# Patient Record
Sex: FEMALE | Race: WHITE | NOT HISPANIC OR LATINO | Employment: FULL TIME | URBAN - METROPOLITAN AREA
[De-identification: names, ages, dates, MRNs, and addresses within clinical notes are randomized per-mention and may not be internally consistent; named-entity substitution may affect disease eponyms.]

---

## 2021-05-25 ENCOUNTER — RECORDS - HEALTHEAST (OUTPATIENT)
Dept: ADMINISTRATIVE | Facility: CLINIC | Age: 46
End: 2021-05-25

## 2021-05-26 ENCOUNTER — RECORDS - HEALTHEAST (OUTPATIENT)
Dept: ADMINISTRATIVE | Facility: CLINIC | Age: 46
End: 2021-05-26

## 2021-05-27 ENCOUNTER — RECORDS - HEALTHEAST (OUTPATIENT)
Dept: ADMINISTRATIVE | Facility: CLINIC | Age: 46
End: 2021-05-27

## 2021-05-28 ENCOUNTER — RECORDS - HEALTHEAST (OUTPATIENT)
Dept: ADMINISTRATIVE | Facility: CLINIC | Age: 46
End: 2021-05-28

## 2023-02-06 ENCOUNTER — TRANSFERRED RECORDS (OUTPATIENT)
Dept: HEALTH INFORMATION MANAGEMENT | Facility: CLINIC | Age: 48
End: 2023-02-06
Payer: COMMERCIAL

## 2023-02-11 ENCOUNTER — TRANSFERRED RECORDS (OUTPATIENT)
Dept: HEALTH INFORMATION MANAGEMENT | Facility: CLINIC | Age: 48
End: 2023-02-11

## 2023-02-16 ENCOUNTER — TRANSFERRED RECORDS (OUTPATIENT)
Dept: HEALTH INFORMATION MANAGEMENT | Facility: CLINIC | Age: 48
End: 2023-02-16
Payer: COMMERCIAL

## 2023-03-09 ENCOUNTER — TRANSFERRED RECORDS (OUTPATIENT)
Dept: HEALTH INFORMATION MANAGEMENT | Facility: CLINIC | Age: 48
End: 2023-03-09
Payer: COMMERCIAL

## 2023-04-05 RX ORDER — METFORMIN HCL 500 MG
500 TABLET, EXTENDED RELEASE 24 HR ORAL
Status: ON HOLD | COMMUNITY
Start: 2022-11-15 | End: 2023-04-11

## 2023-04-05 RX ORDER — GABAPENTIN 300 MG/1
300 CAPSULE ORAL 3 TIMES DAILY
COMMUNITY
Start: 2023-04-03

## 2023-04-05 RX ORDER — ACETAMINOPHEN AND CODEINE PHOSPHATE 120; 12 MG/5ML; MG/5ML
0.35 SOLUTION ORAL AT BEDTIME
COMMUNITY
Start: 2022-11-11

## 2023-04-10 ENCOUNTER — HOSPITAL ENCOUNTER (INPATIENT)
Facility: CLINIC | Age: 48
LOS: 2 days | Discharge: HOME | DRG: 472 | End: 2023-04-12
Attending: ORTHOPAEDIC SURGERY | Admitting: ORTHOPAEDIC SURGERY
Payer: COMMERCIAL

## 2023-04-10 ENCOUNTER — APPOINTMENT (OUTPATIENT)
Dept: RADIOLOGY | Facility: CLINIC | Age: 48
DRG: 472 | End: 2023-04-10
Attending: ORTHOPAEDIC SURGERY
Payer: COMMERCIAL

## 2023-04-10 ENCOUNTER — ANESTHESIA EVENT (OUTPATIENT)
Dept: SURGERY | Facility: CLINIC | Age: 48
DRG: 472 | End: 2023-04-10
Payer: COMMERCIAL

## 2023-04-10 ENCOUNTER — ANESTHESIA (OUTPATIENT)
Dept: SURGERY | Facility: CLINIC | Age: 48
DRG: 472 | End: 2023-04-10
Payer: COMMERCIAL

## 2023-04-10 DIAGNOSIS — G47.33 OSA (OBSTRUCTIVE SLEEP APNEA): ICD-10-CM

## 2023-04-10 DIAGNOSIS — E11.9 TYPE 2 DIABETES MELLITUS WITHOUT COMPLICATION, WITHOUT LONG-TERM CURRENT USE OF INSULIN (H): ICD-10-CM

## 2023-04-10 DIAGNOSIS — I10 PRIMARY HYPERTENSION: ICD-10-CM

## 2023-04-10 DIAGNOSIS — Z98.890 S/P CERVICAL DISC REPLACEMENT: Primary | ICD-10-CM

## 2023-04-10 PROBLEM — M48.061 LUMBAR SPINAL STENOSIS: Status: ACTIVE | Noted: 2023-04-10

## 2023-04-10 LAB
GLUCOSE BLDC GLUCOMTR-MCNC: 178 MG/DL (ref 70–99)
GLUCOSE BLDC GLUCOMTR-MCNC: 180 MG/DL (ref 70–99)
GLUCOSE BLDC GLUCOMTR-MCNC: 205 MG/DL (ref 70–99)
GLUCOSE BLDC GLUCOMTR-MCNC: 212 MG/DL (ref 70–99)
HBA1C MFR BLD: 7 %

## 2023-04-10 PROCEDURE — 250N000009 HC RX 250: Performed by: STUDENT IN AN ORGANIZED HEALTH CARE EDUCATION/TRAINING PROGRAM

## 2023-04-10 PROCEDURE — 360N000078 HC SURGERY LEVEL 5, PER MIN: Performed by: ORTHOPAEDIC SURGERY

## 2023-04-10 PROCEDURE — 258N000003 HC RX IP 258 OP 636: Performed by: PHYSICIAN ASSISTANT

## 2023-04-10 PROCEDURE — 0RG10A0 FUSION OF CERVICAL VERTEBRAL JOINT WITH INTERBODY FUSION DEVICE, ANTERIOR APPROACH, ANTERIOR COLUMN, OPEN APPROACH: ICD-10-PCS | Performed by: ORTHOPAEDIC SURGERY

## 2023-04-10 PROCEDURE — 0RT30ZZ RESECTION OF CERVICAL VERTEBRAL DISC, OPEN APPROACH: ICD-10-PCS | Performed by: ORTHOPAEDIC SURGERY

## 2023-04-10 PROCEDURE — 250N000011 HC RX IP 250 OP 636: Performed by: ANESTHESIOLOGY

## 2023-04-10 PROCEDURE — 01N10ZZ RELEASE CERVICAL NERVE, OPEN APPROACH: ICD-10-PCS | Performed by: ORTHOPAEDIC SURGERY

## 2023-04-10 PROCEDURE — 250N000011 HC RX IP 250 OP 636: Performed by: PHYSICIAN ASSISTANT

## 2023-04-10 PROCEDURE — 272N000001 HC OR GENERAL SUPPLY STERILE: Performed by: ORTHOPAEDIC SURGERY

## 2023-04-10 PROCEDURE — 999N000141 HC STATISTIC PRE-PROCEDURE NURSING ASSESSMENT: Performed by: ORTHOPAEDIC SURGERY

## 2023-04-10 PROCEDURE — C1776 JOINT DEVICE (IMPLANTABLE): HCPCS | Performed by: ORTHOPAEDIC SURGERY

## 2023-04-10 PROCEDURE — 83036 HEMOGLOBIN GLYCOSYLATED A1C: CPT | Performed by: FAMILY MEDICINE

## 2023-04-10 PROCEDURE — 258N000003 HC RX IP 258 OP 636: Performed by: STUDENT IN AN ORGANIZED HEALTH CARE EDUCATION/TRAINING PROGRAM

## 2023-04-10 PROCEDURE — 250N000025 HC SEVOFLURANE, PER MIN: Performed by: ORTHOPAEDIC SURGERY

## 2023-04-10 PROCEDURE — 250N000013 HC RX MED GY IP 250 OP 250 PS 637: Performed by: ANESTHESIOLOGY

## 2023-04-10 PROCEDURE — C1762 CONN TISS, HUMAN(INC FASCIA): HCPCS | Performed by: ORTHOPAEDIC SURGERY

## 2023-04-10 PROCEDURE — 250N000013 HC RX MED GY IP 250 OP 250 PS 637: Performed by: PHYSICIAN ASSISTANT

## 2023-04-10 PROCEDURE — 36415 COLL VENOUS BLD VENIPUNCTURE: CPT | Performed by: FAMILY MEDICINE

## 2023-04-10 PROCEDURE — 250N000013 HC RX MED GY IP 250 OP 250 PS 637: Performed by: FAMILY MEDICINE

## 2023-04-10 PROCEDURE — 250N000012 HC RX MED GY IP 250 OP 636 PS 637: Performed by: FAMILY MEDICINE

## 2023-04-10 PROCEDURE — C1713 ANCHOR/SCREW BN/BN,TIS/BN: HCPCS | Performed by: ORTHOPAEDIC SURGERY

## 2023-04-10 PROCEDURE — 258N000003 HC RX IP 258 OP 636: Performed by: ANESTHESIOLOGY

## 2023-04-10 PROCEDURE — 250N000005 HC OR RX SURGIFLO HEMOSTATIC MATRIX 10ML 199102S OPNP: Performed by: ORTHOPAEDIC SURGERY

## 2023-04-10 PROCEDURE — 370N000017 HC ANESTHESIA TECHNICAL FEE, PER MIN: Performed by: ORTHOPAEDIC SURGERY

## 2023-04-10 PROCEDURE — 999N000182 XR SURGERY CARM FLUORO GREATER THAN 5 MIN: Mod: TC

## 2023-04-10 PROCEDURE — 250N000009 HC RX 250: Performed by: ORTHOPAEDIC SURGERY

## 2023-04-10 PROCEDURE — 710N000010 HC RECOVERY PHASE 1, LEVEL 2, PER MIN: Performed by: ORTHOPAEDIC SURGERY

## 2023-04-10 PROCEDURE — 120N000001 HC R&B MED SURG/OB

## 2023-04-10 PROCEDURE — 0RG10K0 FUSION OF CERVICAL VERTEBRAL JOINT WITH NONAUTOLOGOUS TISSUE SUBSTITUTE, ANTERIOR APPROACH, ANTERIOR COLUMN, OPEN APPROACH: ICD-10-PCS | Performed by: ORTHOPAEDIC SURGERY

## 2023-04-10 PROCEDURE — 99222 1ST HOSP IP/OBS MODERATE 55: CPT | Performed by: FAMILY MEDICINE

## 2023-04-10 PROCEDURE — 250N000011 HC RX IP 250 OP 636: Performed by: STUDENT IN AN ORGANIZED HEALTH CARE EDUCATION/TRAINING PROGRAM

## 2023-04-10 PROCEDURE — 278N000051 HC OR IMPLANT GENERAL: Performed by: ORTHOPAEDIC SURGERY

## 2023-04-10 PROCEDURE — 82962 GLUCOSE BLOOD TEST: CPT

## 2023-04-10 DEVICE — MOBI-C IMPLANT M « STANDARD » 17X17 H5 US
Type: IMPLANTABLE DEVICE | Site: SPINE CERVICAL | Status: FUNCTIONAL
Brand: MOBI-C

## 2023-04-10 DEVICE — ROI-C LONG ANCHORING PLATE
Type: IMPLANTABLE DEVICE | Site: SPINE CERVICAL | Status: FUNCTIONAL
Brand: ROI-C

## 2023-04-10 DEVICE — ROI-C LORDOTIC COATED IMPLANT 14X17 H8
Type: IMPLANTABLE DEVICE | Site: SPINE CERVICAL | Status: FUNCTIONAL
Brand: ROI-C

## 2023-04-10 DEVICE — IMPLANTABLE DEVICE: Type: IMPLANTABLE DEVICE | Status: FUNCTIONAL

## 2023-04-10 DEVICE — GRAFT ALLOGRAFT ARTHREX ALLOSYNC DBM PASTE 1ML ABS-2015-01: Type: IMPLANTABLE DEVICE | Site: SPINE CERVICAL | Status: FUNCTIONAL

## 2023-04-10 RX ORDER — GABAPENTIN 300 MG/1
300 CAPSULE ORAL
Status: COMPLETED | OUTPATIENT
Start: 2023-04-10 | End: 2023-04-10

## 2023-04-10 RX ORDER — LIDOCAINE 40 MG/G
CREAM TOPICAL
Status: DISCONTINUED | OUTPATIENT
Start: 2023-04-10 | End: 2023-04-10 | Stop reason: HOSPADM

## 2023-04-10 RX ORDER — FENTANYL CITRATE 50 UG/ML
25 INJECTION, SOLUTION INTRAMUSCULAR; INTRAVENOUS EVERY 5 MIN PRN
Status: DISCONTINUED | OUTPATIENT
Start: 2023-04-10 | End: 2023-04-10 | Stop reason: HOSPADM

## 2023-04-10 RX ORDER — MULTIVITAMIN,THERAPEUTIC
1 TABLET ORAL DAILY
Status: DISCONTINUED | OUTPATIENT
Start: 2023-04-11 | End: 2023-04-12 | Stop reason: HOSPADM

## 2023-04-10 RX ORDER — POLYETHYLENE GLYCOL 3350 17 G/17G
17 POWDER, FOR SOLUTION ORAL DAILY
Status: DISCONTINUED | OUTPATIENT
Start: 2023-04-11 | End: 2023-04-12 | Stop reason: HOSPADM

## 2023-04-10 RX ORDER — SODIUM CHLORIDE, SODIUM LACTATE, POTASSIUM CHLORIDE, CALCIUM CHLORIDE 600; 310; 30; 20 MG/100ML; MG/100ML; MG/100ML; MG/100ML
INJECTION, SOLUTION INTRAVENOUS CONTINUOUS
Status: DISCONTINUED | OUTPATIENT
Start: 2023-04-10 | End: 2023-04-10 | Stop reason: HOSPADM

## 2023-04-10 RX ORDER — LORATADINE 10 MG/1
10 TABLET ORAL DAILY PRN
Status: DISCONTINUED | OUTPATIENT
Start: 2023-04-10 | End: 2023-04-12 | Stop reason: HOSPADM

## 2023-04-10 RX ORDER — ONDANSETRON 4 MG/1
4 TABLET, ORALLY DISINTEGRATING ORAL EVERY 6 HOURS PRN
Status: DISCONTINUED | OUTPATIENT
Start: 2023-04-10 | End: 2023-04-12 | Stop reason: HOSPADM

## 2023-04-10 RX ORDER — ONDANSETRON 2 MG/ML
4 INJECTION INTRAMUSCULAR; INTRAVENOUS EVERY 30 MIN PRN
Status: DISCONTINUED | OUTPATIENT
Start: 2023-04-10 | End: 2023-04-10 | Stop reason: HOSPADM

## 2023-04-10 RX ORDER — GABAPENTIN 100 MG/1
100 CAPSULE ORAL 3 TIMES DAILY
Status: DISCONTINUED | OUTPATIENT
Start: 2023-04-10 | End: 2023-04-10

## 2023-04-10 RX ORDER — ONDANSETRON 2 MG/ML
INJECTION INTRAMUSCULAR; INTRAVENOUS PRN
Status: DISCONTINUED | OUTPATIENT
Start: 2023-04-10 | End: 2023-04-10

## 2023-04-10 RX ORDER — FENTANYL CITRATE 50 UG/ML
50 INJECTION, SOLUTION INTRAMUSCULAR; INTRAVENOUS EVERY 5 MIN PRN
Status: DISCONTINUED | OUTPATIENT
Start: 2023-04-10 | End: 2023-04-10 | Stop reason: HOSPADM

## 2023-04-10 RX ORDER — HYDROMORPHONE HCL IN WATER/PF 6 MG/30 ML
0.2 PATIENT CONTROLLED ANALGESIA SYRINGE INTRAVENOUS
Status: DISCONTINUED | OUTPATIENT
Start: 2023-04-10 | End: 2023-04-12 | Stop reason: HOSPADM

## 2023-04-10 RX ORDER — OXYCODONE HYDROCHLORIDE 5 MG/1
5 TABLET ORAL EVERY 4 HOURS PRN
Status: DISCONTINUED | OUTPATIENT
Start: 2023-04-10 | End: 2023-04-11

## 2023-04-10 RX ORDER — ONDANSETRON 2 MG/ML
4 INJECTION INTRAMUSCULAR; INTRAVENOUS EVERY 6 HOURS PRN
Status: DISCONTINUED | OUTPATIENT
Start: 2023-04-10 | End: 2023-04-12 | Stop reason: HOSPADM

## 2023-04-10 RX ORDER — CEFAZOLIN SODIUM 2 G/100ML
2 INJECTION, SOLUTION INTRAVENOUS EVERY 8 HOURS
Status: COMPLETED | OUTPATIENT
Start: 2023-04-10 | End: 2023-04-11

## 2023-04-10 RX ORDER — ACETAMINOPHEN 325 MG/1
650 TABLET ORAL EVERY 4 HOURS PRN
Status: DISCONTINUED | OUTPATIENT
Start: 2023-04-13 | End: 2023-04-12 | Stop reason: HOSPADM

## 2023-04-10 RX ORDER — PROCHLORPERAZINE MALEATE 10 MG
10 TABLET ORAL EVERY 6 HOURS PRN
Status: DISCONTINUED | OUTPATIENT
Start: 2023-04-10 | End: 2023-04-12 | Stop reason: HOSPADM

## 2023-04-10 RX ORDER — HYDROXYZINE HYDROCHLORIDE 25 MG/1
25 TABLET, FILM COATED ORAL EVERY 6 HOURS PRN
Status: DISCONTINUED | OUTPATIENT
Start: 2023-04-10 | End: 2023-04-12 | Stop reason: HOSPADM

## 2023-04-10 RX ORDER — HYDROMORPHONE HCL IN WATER/PF 6 MG/30 ML
0.2 PATIENT CONTROLLED ANALGESIA SYRINGE INTRAVENOUS EVERY 5 MIN PRN
Status: DISCONTINUED | OUTPATIENT
Start: 2023-04-10 | End: 2023-04-10 | Stop reason: HOSPADM

## 2023-04-10 RX ORDER — ACETAMINOPHEN 325 MG/1
975 TABLET ORAL EVERY 8 HOURS
Status: DISCONTINUED | OUTPATIENT
Start: 2023-04-10 | End: 2023-04-11

## 2023-04-10 RX ORDER — EPHEDRINE SULFATE 50 MG/ML
INJECTION, SOLUTION INTRAMUSCULAR; INTRAVENOUS; SUBCUTANEOUS PRN
Status: DISCONTINUED | OUTPATIENT
Start: 2023-04-10 | End: 2023-04-10

## 2023-04-10 RX ORDER — AMOXICILLIN 250 MG
1 CAPSULE ORAL 2 TIMES DAILY
Status: DISCONTINUED | OUTPATIENT
Start: 2023-04-10 | End: 2023-04-12 | Stop reason: HOSPADM

## 2023-04-10 RX ORDER — SODIUM CHLORIDE 9 MG/ML
INJECTION, SOLUTION INTRAVENOUS CONTINUOUS
Status: DISCONTINUED | OUTPATIENT
Start: 2023-04-10 | End: 2023-04-12 | Stop reason: HOSPADM

## 2023-04-10 RX ORDER — FENTANYL CITRATE 50 UG/ML
INJECTION, SOLUTION INTRAMUSCULAR; INTRAVENOUS PRN
Status: DISCONTINUED | OUTPATIENT
Start: 2023-04-10 | End: 2023-04-10

## 2023-04-10 RX ORDER — ACETAMINOPHEN 325 MG/1
975 TABLET ORAL ONCE
Status: COMPLETED | OUTPATIENT
Start: 2023-04-10 | End: 2023-04-10

## 2023-04-10 RX ORDER — DEXAMETHASONE SODIUM PHOSPHATE 4 MG/ML
INJECTION, SOLUTION INTRA-ARTICULAR; INTRALESIONAL; INTRAMUSCULAR; INTRAVENOUS; SOFT TISSUE PRN
Status: DISCONTINUED | OUTPATIENT
Start: 2023-04-10 | End: 2023-04-10

## 2023-04-10 RX ORDER — CEFAZOLIN SODIUM/WATER 3 G/30 ML
3 SYRINGE (ML) INTRAVENOUS SEE ADMIN INSTRUCTIONS
Status: DISCONTINUED | OUTPATIENT
Start: 2023-04-10 | End: 2023-04-10 | Stop reason: HOSPADM

## 2023-04-10 RX ORDER — CEFAZOLIN SODIUM/WATER 3 G/30 ML
3 SYRINGE (ML) INTRAVENOUS
Status: COMPLETED | OUTPATIENT
Start: 2023-04-10 | End: 2023-04-10

## 2023-04-10 RX ORDER — GABAPENTIN 300 MG/1
300 CAPSULE ORAL 3 TIMES DAILY
Status: DISCONTINUED | OUTPATIENT
Start: 2023-04-10 | End: 2023-04-11

## 2023-04-10 RX ORDER — PROPOFOL 10 MG/ML
INJECTION, EMULSION INTRAVENOUS CONTINUOUS PRN
Status: DISCONTINUED | OUTPATIENT
Start: 2023-04-10 | End: 2023-04-10

## 2023-04-10 RX ORDER — NICOTINE POLACRILEX 4 MG
15-30 LOZENGE BUCCAL
Status: DISCONTINUED | OUTPATIENT
Start: 2023-04-10 | End: 2023-04-12 | Stop reason: HOSPADM

## 2023-04-10 RX ORDER — ACETAMINOPHEN AND CODEINE PHOSPHATE 120; 12 MG/5ML; MG/5ML
0.35 SOLUTION ORAL AT BEDTIME
Status: DISCONTINUED | OUTPATIENT
Start: 2023-04-10 | End: 2023-04-12 | Stop reason: HOSPADM

## 2023-04-10 RX ORDER — DEXTROSE MONOHYDRATE 25 G/50ML
25-50 INJECTION, SOLUTION INTRAVENOUS
Status: DISCONTINUED | OUTPATIENT
Start: 2023-04-10 | End: 2023-04-12 | Stop reason: HOSPADM

## 2023-04-10 RX ORDER — ONDANSETRON 4 MG/1
4 TABLET, ORALLY DISINTEGRATING ORAL EVERY 30 MIN PRN
Status: DISCONTINUED | OUTPATIENT
Start: 2023-04-10 | End: 2023-04-10 | Stop reason: HOSPADM

## 2023-04-10 RX ORDER — OXYCODONE HYDROCHLORIDE 5 MG/1
10 TABLET ORAL EVERY 4 HOURS PRN
Status: DISCONTINUED | OUTPATIENT
Start: 2023-04-10 | End: 2023-04-11

## 2023-04-10 RX ORDER — HYDROMORPHONE HCL IN WATER/PF 6 MG/30 ML
0.4 PATIENT CONTROLLED ANALGESIA SYRINGE INTRAVENOUS EVERY 5 MIN PRN
Status: DISCONTINUED | OUTPATIENT
Start: 2023-04-10 | End: 2023-04-10 | Stop reason: HOSPADM

## 2023-04-10 RX ORDER — LIDOCAINE HYDROCHLORIDE 10 MG/ML
INJECTION, SOLUTION INFILTRATION; PERINEURAL PRN
Status: DISCONTINUED | OUTPATIENT
Start: 2023-04-10 | End: 2023-04-10

## 2023-04-10 RX ORDER — PROPOFOL 10 MG/ML
INJECTION, EMULSION INTRAVENOUS PRN
Status: DISCONTINUED | OUTPATIENT
Start: 2023-04-10 | End: 2023-04-10

## 2023-04-10 RX ORDER — BISACODYL 10 MG
10 SUPPOSITORY, RECTAL RECTAL DAILY PRN
Status: DISCONTINUED | OUTPATIENT
Start: 2023-04-10 | End: 2023-04-12 | Stop reason: HOSPADM

## 2023-04-10 RX ORDER — HYDROMORPHONE HCL IN WATER/PF 6 MG/30 ML
0.4 PATIENT CONTROLLED ANALGESIA SYRINGE INTRAVENOUS
Status: DISCONTINUED | OUTPATIENT
Start: 2023-04-10 | End: 2023-04-12 | Stop reason: HOSPADM

## 2023-04-10 RX ORDER — MAGNESIUM HYDROXIDE 1200 MG/15ML
LIQUID ORAL PRN
Status: DISCONTINUED | OUTPATIENT
Start: 2023-04-10 | End: 2023-04-10 | Stop reason: HOSPADM

## 2023-04-10 RX ADMIN — SODIUM CHLORIDE, POTASSIUM CHLORIDE, SODIUM LACTATE AND CALCIUM CHLORIDE: 600; 310; 30; 20 INJECTION, SOLUTION INTRAVENOUS at 08:51

## 2023-04-10 RX ADMIN — Medication 3 G: at 09:30

## 2023-04-10 RX ADMIN — BENZOCAINE AND MENTHOL 1 LOZENGE: 15; 3.6 LOZENGE ORAL at 18:07

## 2023-04-10 RX ADMIN — ACETAMINOPHEN 975 MG: 325 TABLET ORAL at 08:25

## 2023-04-10 RX ADMIN — MIDAZOLAM 2 MG: 1 INJECTION INTRAMUSCULAR; INTRAVENOUS at 09:30

## 2023-04-10 RX ADMIN — ROCURONIUM BROMIDE 10 MG: 10 INJECTION, SOLUTION INTRAVENOUS at 10:42

## 2023-04-10 RX ADMIN — PHENYLEPHRINE HYDROCHLORIDE 100 MCG: 10 INJECTION INTRAVENOUS at 10:24

## 2023-04-10 RX ADMIN — SODIUM CHLORIDE, POTASSIUM CHLORIDE, SODIUM LACTATE AND CALCIUM CHLORIDE: 600; 310; 30; 20 INJECTION, SOLUTION INTRAVENOUS at 11:20

## 2023-04-10 RX ADMIN — PROPOFOL 50 MCG/KG/MIN: 10 INJECTION, EMULSION INTRAVENOUS at 09:45

## 2023-04-10 RX ADMIN — CEFAZOLIN SODIUM 2 G: 2 INJECTION, SOLUTION INTRAVENOUS at 16:47

## 2023-04-10 RX ADMIN — LIDOCAINE HYDROCHLORIDE 2 ML: 10 INJECTION, SOLUTION INFILTRATION; PERINEURAL at 09:39

## 2023-04-10 RX ADMIN — BENZOCAINE AND MENTHOL 1 LOZENGE: 15; 3.6 LOZENGE ORAL at 20:08

## 2023-04-10 RX ADMIN — Medication 5 MG: at 10:38

## 2023-04-10 RX ADMIN — DEXAMETHASONE SODIUM PHOSPHATE 4 MG: 4 INJECTION, SOLUTION INTRA-ARTICULAR; INTRALESIONAL; INTRAMUSCULAR; INTRAVENOUS; SOFT TISSUE at 09:39

## 2023-04-10 RX ADMIN — FENTANYL CITRATE 25 MCG: 50 INJECTION INTRAMUSCULAR; INTRAVENOUS at 12:10

## 2023-04-10 RX ADMIN — HYDROXYZINE HYDROCHLORIDE 25 MG: 25 TABLET ORAL at 13:22

## 2023-04-10 RX ADMIN — ACETAMINOPHEN 975 MG: 325 TABLET ORAL at 16:43

## 2023-04-10 RX ADMIN — HYDROMORPHONE HYDROCHLORIDE 1 MG: 1 INJECTION, SOLUTION INTRAMUSCULAR; INTRAVENOUS; SUBCUTANEOUS at 09:56

## 2023-04-10 RX ADMIN — Medication 5 MG: at 11:07

## 2023-04-10 RX ADMIN — PHENYLEPHRINE HYDROCHLORIDE 100 MCG: 10 INJECTION INTRAVENOUS at 10:20

## 2023-04-10 RX ADMIN — Medication 5 MG: at 10:33

## 2023-04-10 RX ADMIN — FENTANYL CITRATE 100 MCG: 50 INJECTION, SOLUTION INTRAMUSCULAR; INTRAVENOUS at 09:39

## 2023-04-10 RX ADMIN — SODIUM CHLORIDE: 9 INJECTION, SOLUTION INTRAVENOUS at 16:47

## 2023-04-10 RX ADMIN — ROCURONIUM BROMIDE 50 MG: 10 INJECTION, SOLUTION INTRAVENOUS at 09:40

## 2023-04-10 RX ADMIN — PHENYLEPHRINE HYDROCHLORIDE 100 MCG: 10 INJECTION INTRAVENOUS at 10:55

## 2023-04-10 RX ADMIN — SUGAMMADEX 200 MG: 100 INJECTION, SOLUTION INTRAVENOUS at 11:23

## 2023-04-10 RX ADMIN — PHENYLEPHRINE HYDROCHLORIDE 100 MCG: 10 INJECTION INTRAVENOUS at 09:53

## 2023-04-10 RX ADMIN — OXYCODONE HYDROCHLORIDE 10 MG: 5 TABLET ORAL at 18:40

## 2023-04-10 RX ADMIN — SENNOSIDES AND DOCUSATE SODIUM 1 TABLET: 50; 8.6 TABLET ORAL at 20:08

## 2023-04-10 RX ADMIN — Medication 5 MG: at 10:55

## 2023-04-10 RX ADMIN — PHENYLEPHRINE HYDROCHLORIDE 100 MCG: 10 INJECTION INTRAVENOUS at 10:38

## 2023-04-10 RX ADMIN — HYDROMORPHONE HYDROCHLORIDE 0.4 MG: 0.2 INJECTION, SOLUTION INTRAMUSCULAR; INTRAVENOUS; SUBCUTANEOUS at 16:44

## 2023-04-10 RX ADMIN — PHENYLEPHRINE HYDROCHLORIDE 100 MCG: 10 INJECTION INTRAVENOUS at 10:13

## 2023-04-10 RX ADMIN — ONDANSETRON 4 MG: 2 INJECTION INTRAMUSCULAR; INTRAVENOUS at 11:12

## 2023-04-10 RX ADMIN — GABAPENTIN 300 MG: 300 CAPSULE ORAL at 20:08

## 2023-04-10 RX ADMIN — ROCURONIUM BROMIDE 20 MG: 10 INJECTION, SOLUTION INTRAVENOUS at 10:05

## 2023-04-10 RX ADMIN — INSULIN ASPART 2 UNITS: 100 INJECTION, SOLUTION INTRAVENOUS; SUBCUTANEOUS at 18:08

## 2023-04-10 RX ADMIN — SODIUM CHLORIDE: 9 INJECTION, SOLUTION INTRAVENOUS at 23:29

## 2023-04-10 RX ADMIN — PROPOFOL 180 MG: 10 INJECTION, EMULSION INTRAVENOUS at 09:39

## 2023-04-10 RX ADMIN — OXYCODONE HYDROCHLORIDE 5 MG: 5 TABLET ORAL at 13:22

## 2023-04-10 ASSESSMENT — ACTIVITIES OF DAILY LIVING (ADL)
ADLS_ACUITY_SCORE: 37
ADLS_ACUITY_SCORE: 35
ADLS_ACUITY_SCORE: 37

## 2023-04-10 NOTE — ANESTHESIA POSTPROCEDURE EVALUATION
Patient: Eleanor Elizabeth    Procedure: Procedure(s):  CERVICAL 5-6 ANTERIOR DISC REPLACEMENT CERVICAL 6-7 ANTERIOR DISCECTOMY AND FUSION       Anesthesia Type:  General    Note:  Disposition: Inpatient; Admission   Postop Pain Control: Uneventful            Sign Out: Well controlled pain   PONV: No   Neuro/Psych: Uneventful            Sign Out: Acceptable/Baseline neuro status   Airway/Respiratory: Uneventful            Sign Out: Acceptable/Baseline resp. status   CV/Hemodynamics: Uneventful            Sign Out: Acceptable CV status; No obvious hypovolemia; No obvious fluid overload   Other NRE: NONE   DID A NON-ROUTINE EVENT OCCUR? No           Last vitals:  Vitals Value Taken Time   /74 04/10/23 1239   Temp 36  C (96.8  F) 04/10/23 1230   Pulse 82 04/10/23 1245   Resp 10 04/10/23 1231   SpO2 94 % 04/10/23 1245   Vitals shown include unvalidated device data.    Electronically Signed By: Karlene Albarran MD  April 10, 2023  12:47 PM

## 2023-04-10 NOTE — ANESTHESIA PREPROCEDURE EVALUATION
Anesthesia Pre-Procedure Evaluation    Patient: Eleanor Elizabeth   MRN: [id number] : 1975        Procedure : Procedure(s):  BILATERAL CERVICAL 5-6 AND CERVICAL 6-7 ANTERIOR DISC REPLACEMENT          Past Medical History:   Diagnosis Date     Diabetes (H)      Obese      PONV (postoperative nausea and vomiting)       History reviewed. No pertinent surgical history.   No Known Allergies   Social History     Tobacco Use     Smoking status: Never     Smokeless tobacco: Never   Vaping Use     Vaping status: Not on file   Substance Use Topics     Alcohol use: Never      Wt Readings from Last 1 Encounters:   04/10/23 124.3 kg (274 lb)        Anesthesia Evaluation   Pt has had prior anesthetic. Type: General.    History of anesthetic complications  - PONV.      ROS/MED HX  ENT/Pulmonary:     (+) JERED risk factors, obese,     Neurologic:  - neg neurologic ROS     Cardiovascular:  - neg cardiovascular ROS     METS/Exercise Tolerance: >4 METS    Hematologic:  - neg hematologic  ROS     Musculoskeletal:  - neg musculoskeletal ROS     GI/Hepatic:  - neg GI/hepatic ROS     Renal/Genitourinary:       Endo:     (+) type I DM, Obesity,     Psychiatric/Substance Use:  - neg psychiatric ROS     Infectious Disease:       Malignancy:       Other:            Physical Exam    Airway        Mallampati: II   TM distance: > 3 FB   Neck ROM: full     Respiratory Devices and Support         Dental       (+) Modest Abnormalities - crowns, retainers, 1 or 2 missing teeth      Cardiovascular          Rhythm and rate: regular and normal     Pulmonary           breath sounds clear to auscultation           OUTSIDE LABS:  CBC: No results found for: WBC, HGB, HCT, PLT  BMP: No results found for: NA, POTASSIUM, CHLORIDE, CO2, BUN, CR, GLC  COAGS: No results found for: PTT, INR, FIBR  POC: No results found for: BGM, HCG, HCGS  HEPATIC: No results found for: ALBUMIN, PROTTOTAL, ALT, AST, GGT, ALKPHOS, BILITOTAL, BILIDIRECT, GIOVANNI  OTHER: No  results found for: PH, LACT, A1C, VICKY, PHOS, MAG, LIPASE, AMYLASE, TSH, T4, T3, CRP, SED    Anesthesia Plan    ASA Status:  3      Anesthesia Type: General.     - Airway: ETT              Consents    Anesthesia Plan(s) and associated risks, benefits, and realistic alternatives discussed. Questions answered and patient/representative(s) expressed understanding.    - Discussed:     - Discussed with:  Patient         Postoperative Care    Pain management: IV analgesics.   PONV prophylaxis: Dexamethasone or Solumedrol, Ondansetron (or other 5HT-3)     Comments:                Karlene Albarran MD

## 2023-04-10 NOTE — CONSULTS
[facility] MEDICINE CONSULT NOTE   Physician requesting consult: Barber Llamas MD    Reason for consult: Postoperative medical management of medical co-morbidities as below    Assessment and Plan    Eleanor Elizabeth is a 47 year old old female with a history of DM2, underwent C5-6, C6-7 discectomy and nerve decompression.  EBL 20 ml.  Neck pain is stable.      Procedure(s):  CERVICAL 5-6 ANTERIOR DISC REPLACEMENT CERVICAL 6-7 ANTERIOR DISCECTOMY AND FUSION  Post-operative Day: Day of Surgery     DM2  Metformin 500 mg daily, on hold  Diabetic diet and insulin sliding scale    Elevated blood pressure  Monitor blood pressure closely  Not on antihypertensives    Morbid obesity Body mass index is 47.78 kg/m .   Modification of lifestyle for weight loss  Consider outpatient sleep study to rule out obstructive sleep apnea      Status post C5-6, C6-7 discectomy and nerve decompression  Resume routine postoperative care  Physical and Occupational Therapy  Use incentive spirometry frequently  DVT prophylaxis per orthopedics with early ambulation and SCDs  Pain control with Tylenol 975 mg every 8 hours, 650 mg every 4 hours as needed, oxycodone 5 to 10 mg every 4 hours as needed, IV Dilaudid 0.2 to 0.4 mg every 2 hours as needed      -Reviewed the patient's preoperative H and P and updated missing elements.  -Home medication reconciliation has been reviewed.  Medications have been ordered as noted from the home list and changes are documented above     HISTORY     Eleanor Elizbaeth is a 47 year old  female DM2, moderate obesity, underwent C5-6, C6-7 discectomy and nerve decompression.  No new radiculopathy.  Neck pain is stable.  On metformin 500 mg daily for DM2.  Blood pressure is in higher side.  Not on any antihypertensives.  No history of hypertension.  Denies headache, chest pain, breathing difficulty, palpitation, nausea, vomiting, abdominal pain or urinary symptoms.  Does not have  history of heart disease, lung disease, bleeding or clotting disorder or sleep apnea.  Preop physical is reviewed.  History is provided by the patient.    Past Medical History     Patient Active Problem List    Diagnosis Date Noted     Lumbar spinal stenosis 04/10/2023     Priority: Medium        Surgical History   She  has no past surgical history on file.   History reviewed. No pertinent surgical history.    Family History    Reviewed, and family history is not on file.    Social History    Reviewed, and she  reports that she has never smoked. She has never used smokeless tobacco. She reports that she does not drink alcohol and does not use drugs.  Social History     Tobacco Use     Smoking status: Never     Smokeless tobacco: Never   Vaping Use     Vaping status: Not on file   Substance Use Topics     Alcohol use: Never       Allergies   No Known Allergies    Prior to Admission Medications      Medications Prior to Admission   Medication Sig Dispense Refill Last Dose     aspirin (ASA) 81 MG EC tablet Take 1 tablet by mouth daily   4/2/2023     cholecalciferol (VITAMIN D3) 10 mcg (400 units) TABS tablet Take 10 mcg by mouth daily   4/2/2023     gabapentin (NEURONTIN) 300 MG capsule Take 300 mg by mouth 3 times daily   4/10/2023 at am     metFORMIN (GLUCOPHAGE XR) 500 MG 24 hr tablet Take 500 mg by mouth daily (with dinner)   4/8/2023     Multiple Vitamins-Iron (MULTIPLE VITAMIN/IRON OR) Take 1 tablet by mouth daily   4/2/2023     norethindrone (MICRONOR) 0.35 MG tablet Take 0.35 mg by mouth At Bedtime   4/9/2023       Review of Systems   A 12 point comprehensive review of systems was negative except as noted above.    OBJECTIVE         Physical Exam   Temp:  [97.1  F (36.2  C)-97.8  F (36.6  C)] 97.1  F (36.2  C)  Pulse:  [71-92] 80  Resp:  [12-17] 17  BP: (152-177)/(62-83) 177/83  SpO2:  [98 %-99 %] 98 %  Body mass index is 47.78 kg/m .    GENERAL:  Alert, appears comfortable, in no acute distress, appears stated  age, morbidly obese   HEAD:  Normocephalic, without obvious abnormality, atraumatic   EYES:  PERRL, conjunctiva  clear,   EOM's intact   NOSE: Nares normal,   mucosa normal, no drainage   THROAT: Lips, mucosa,   gums normal, mouth moist   NECK: S/P anterior neck surgery   BACK:   Symmetric, no curvature, ROM normal   LUNGS:   Clear to auscultation bilaterally, no rales, rhonchi, or wheezing, symmetric chest rise on inhalation, respirations unlabored   CHEST WALL:  No tenderness or deformity   HEART:  Regular rate and rhythm, S1 and S2 normal, no murmur    ABDOMEN:   Soft, non-tender, bowel sounds active , no masses, no organomegaly, no rebound or guarding   EXTREMITIES: No  edema    SKIN: No rashes   NEURO: Alert, oriented x 3, moves all four extremities freely/spontaneously   PSYCH: Cooperative, behavior is appropriate          Labs Reviewed Personally By Myself   Hemoglobin A1c 7.1  Sodium 140, potassium 4.3, creatinine 0.65, glucose 150  WBC 10.4, hemoglobin 15, platelet 200  Preoperative Labs Reviewed Personally By Myself     Thank you for this consultation.  Appreciate the opportunity to participate in the care of Eleanor Elizabeth, please feel free to contact us for any questions or concerns.    Total time spent 70 min on the date of service doing chart review, history, exam, documentation & further activities per the note.     Ce Vail MD  [facility]  [facility]  Phone: #[phone]

## 2023-04-10 NOTE — OP NOTE
DATE OF SERVICE: 4-10-23    PREOPERATIVE DIAGNOSES:   1. C5-6 and C6-7 disk osteophyte complex compressing the exiting bilateral C6 and C7 nerve roots and also central stenosis C5-6 and C6-7  2. Failure of conservative measures.     POSTOPERATIVE DIAGNOSES:   Same    PROCEDURE:   1. Left-sided Addison Benitez anterior approach to cervical spine.   2. Complete block diskectomy C5-6 and C6-7 with bilateral uncovertebral resections and   decompression back to the level of the spinal cord.   3. Placement of a LDR Mobi-C total disk arthroplasty at C5-6 , 5 mm height x 17 mm   depth x 17 mm width.   4.  Intraoperative decision that a disc replacement would not fit nor could we get proper imaging due to the patient's size at C6-7 and therefore placement of an anterior cervical fusion C6-7 with [employer] REZA-C lordotic cervical cage 8 mm height 14 mm depth 17 mm width 7 degrees lordosis with 1 cc of DBX  5.  Long plates placed anteriorly C6-7    SURGEON: Dr. Rudy Llamas.     ASSISTANT: Aleksandr Bennett PA-C who was needed for patient positioning, soft tissue   retraction, patient safety and assistance with closure of the wound.     ESTIMATED BLOOD LOSS: 20 mL.     DRAINS: None.     COMPLICATIONS: None perceived.     SPECIMENS SENT: None.     HISTORY OF PRESENT ILLNESS AND INDICATIONS FOR PROCEDURE: This is a very pleasant   47-year-old who came to see me in clinic with left greater than right arm pain. She had a disk and osteophyte complex compressing the C6 and C7   nerve roots  bilaterally at C5-6 and C6-7.  Also there was central stenosis most notably at C6-7.  We discussed options with him of continued   nonoperative management, physical therapy, anti-inflammatories or surgical   intervention. Given her extensive nonoperative measures and arm pain surgery was most certainly warranted.     DESCRIPTION OF PROCEDURE     Therefore, the patient was seen in the preop area of [facility] today. The neck was marked and the  consent was again reverified. Patient was   brought to the operating room, intubated via general endotracheal anesthetic and   placed on a flat top table with care taken to pad all bony prominences.   Pause for the Cause was performed correctly identifying the patient, procedure,   proposed plan and radiographs and all were in agreement. This was done after the   patient was prepped and draped in a standard fashion for a cervical spine procedure.   We then localized our incision with lateral fluoroscopy and performed a left-sided   Addison Benitez anterior approach to the cervical spine. We performed a complete   block diskectomy at C5-6 with bilateral uncovertebral resections. We removed a   great deal of disc and osteophyte that were compressing the spinal cord posteriorly.  It was clear to see there was quite a bit of inflammation. The posterior longitudinal ligament was significantly adherent to the thecal sac and we gently painstakingly removed it. When we were finished we could very clearly and easily see the C6 nerve   roots and there was absolutely no central neural compression.   We then trialed our C5-6 implant   using a number of different trials for height and width along with depth. We   settled on the 5 height 17mm width 17mm depth.  We tried to get the biggest possible implant that we could given the amount of bony decompression that we had to do.  Wanted to cover up the endplates as much as possible to try to avoid heterotopic ossification.  The implant was then placed and verified on AP and lateral fluoroscopy to be deemed   in good position. We then removed the  and removed our self-retaining   retractors.   We then traveled down to the C6-7 level.  We performed a complete block discectomy after placing the self-retaining retractors.  We dissected all the way back behind the posterior longitudinal ligament to the level of the spinal cord and performed bilateral uncovertebral resections.   There was a severe amount of central and bilateral foraminal stenosis.  After the bilateral foramen and the central canal was decompressed, we look to see if we could place the disc replacement and given the visualization and the amount of bony decompression that we had to perform, we did not think it would be safe therefore we made the intraoperative decision to do the cervical fusion.  We therefore trialed and placed the REZA C lordotic cage with 1 cc of DBX.  We then placed the 2 medium plates.  It was in good position on AP and lateral x-ray.    We then removed our self-retaining retractor and our Pelzer pins  We used bone wax to cover all bleeding areas to make certain that we   try to avoid heterotopic ossification as much as possible. We then irrigated the   wound copiously, closed the platysma with #1 Vicryl, subcutaneous tissue with 2-0   Vicryl, skin with 3-0 Vicryl. Steri-Strips and sterile dressing were applied. The   patient tolerated the procedure well. There were no apparent complications. She  will be weightbearing as tolerated bilateral lower extremities with strict   instructions to avoid lifting more than 15 pounds over the next 6 weeks. She  will have early mobilization and DEIDRE stockings for her DVT prophylaxis and receive 2   grams of Ancef IV q.8 hours x2 doses for antibiotics. Return to see me in 6 weeks,   sooner if there are any problems, questions or concerns.

## 2023-04-10 NOTE — ANESTHESIA CARE TRANSFER NOTE
Patient: Eleanor Elizabeth    Procedure: Procedure(s):  CERVICAL 5-6 ANTERIOR DISC REPLACEMENT CERVICAL 6-7 ANTERIOR DISCECTOMY AND FUSION       Diagnosis: Cervical radiculopathy [M54.12]  Myelopathy (H) [G95.9]  Spinal stenosis of cervical region [M48.02]  Diagnosis Additional Information: No value filed.    Anesthesia Type:   General     Note:    Oropharynx: oropharynx clear of all foreign objects  Level of Consciousness: drowsy  Oxygen Supplementation: face mask  Level of Supplemental Oxygen (L/min / FiO2): 8  Independent Airway: airway patency satisfactory and stable  Dentition: dentition unchanged  Vital Signs Stable: post-procedure vital signs reviewed and stable  Report to RN Given: handoff report given  Patient transferred to: PACU    Handoff Report: Identifed the Patient, Identified the Reponsible Provider, Reviewed the pertinent medical history, Discussed the surgical course, Reviewed Intra-OP anesthesia mangement and issues during anesthesia, Set expectations for post-procedure period and Allowed opportunity for questions and acknowledgement of understanding      Vitals:  Vitals Value Taken Time   /78 04/10/23 1138   Temp 36.2  C (97.1  F) 04/10/23 1138   Pulse 92 04/10/23 1138   Resp 16 04/10/23 1138   SpO2 99        Electronically Signed By: WALTER Eagle CRNA  April 10, 2023  11:39 AM

## 2023-04-10 NOTE — OR NURSING
Called REINA Albarran re: .  She is okay not to treat at this time   Back pain x 4 days, denies trauma

## 2023-04-10 NOTE — PLAN OF CARE
Patient vital signs are at baseline: Yes  Patient able to ambulate as they were prior to admission or with assist devices provided by therapies during their stay:  No,  Reason:  Due to ambulate   Patient MUST void prior to discharge:  Yes but requires x2 PVR's to complete bladder management protocol   Patient able to tolerate oral intake:  Yes  Pain has adequate pain control using Oral analgesics:  No,  Reason:  IV Dilaudid for severe breakthrough pain   Does patient have an identified :  Yes  Has goal D/C date and time been discussed with patient:  Yes

## 2023-04-10 NOTE — INTERVAL H&P NOTE
"I have reviewed the surgical (or preoperative) H&P that is linked to this encounter, and examined the patient. There are no significant changes    Clinical Conditions Present on Arrival:  Clinically Significant Risk Factors Present on Admission                    # Severe Obesity: Estimated body mass index is 47.78 kg/m  as calculated from the following:    Height as of this encounter: 1.613 m (5' 3.5\").    Weight as of this encounter: 124.3 kg (274 lb).       "

## 2023-04-10 NOTE — ANESTHESIA PROCEDURE NOTES
Airway       Patient location during procedure: OR       Procedure Start/Stop Times: 4/10/2023 9:42 AM  Staff -        CRNA: Meaghan Mccurdy APRN CRNA       Performed By: CRNA  Consent for Airway        Urgency: elective  Indications and Patient Condition       Indications for airway management: destin-procedural       Induction type:intravenous       Mask difficulty assessment: 2 - vent by mask + OA or adjuvant +/- NMBA    Final Airway Details       Final airway type: endotracheal airway       Successful airway: ETT - single  Endotracheal Airway Details        ETT size (mm): 7.0       Cuffed: yes       Successful intubation technique: video laryngoscopy       VL Blade Size: Glidescope 3       Grade View of Cords: 1       Adjucts: stylet       Position: Right       Measured from: lips       Secured at (cm): 20       Bite block used: None    Post intubation assessment        Placement verified by: capnometry and equal breath sounds        Number of attempts at approach: 1       Number of other approaches attempted: 0       Secured with: silk tape       Ease of procedure: easy       Dentition: Intact and Unchanged    Medication(s) Administered   Medication Administration Time: 4/10/2023 9:42 AM

## 2023-04-10 NOTE — PHARMACY-ADMISSION MEDICATION HISTORY
Pharmacy Note - Admission Medication History    Pertinent Provider Information:    ______________________________________________________________________    Prior To Admission (PTA) med list completed and updated in EMR.       PTA Med List   Medication Sig Last Dose     aspirin (ASA) 81 MG EC tablet Take 1 tablet by mouth daily 4/2/2023     cholecalciferol (VITAMIN D3) 10 mcg (400 units) TABS tablet Take 10 mcg by mouth daily 4/2/2023     gabapentin (NEURONTIN) 300 MG capsule Take 300 mg by mouth 3 times daily 4/10/2023 at am     metFORMIN (GLUCOPHAGE XR) 500 MG 24 hr tablet Take 500 mg by mouth daily (with dinner) 4/8/2023     Multiple Vitamins-Iron (MULTIPLE VITAMIN/IRON OR) Take 1 tablet by mouth daily 4/2/2023     norethindrone (MICRONOR) 0.35 MG tablet Take 0.35 mg by mouth At Bedtime 4/9/2023       Information source(s): [facility]    Method of interview communication: in-person    Patient was asked about OTC/herbal products specifically.  PTA med list reflects this.    Based on the pharmacist's assessment, the PTA med list information appears reliable    Medication Affordability:       Allergies were reviewed, assessed, and updated with the patient.      Patient does not use any multi-dose medications prior to admission.     Thank you for the opportunity to participate in the care of this patient.      Navjot John RPH     4/10/2023     8:48 AM

## 2023-04-11 ENCOUNTER — APPOINTMENT (OUTPATIENT)
Dept: PHYSICAL THERAPY | Facility: CLINIC | Age: 48
DRG: 472 | End: 2023-04-11
Attending: ORTHOPAEDIC SURGERY
Payer: COMMERCIAL

## 2023-04-11 LAB
ANION GAP SERPL CALCULATED.3IONS-SCNC: 8 MMOL/L (ref 5–18)
BUN SERPL-MCNC: 10 MG/DL (ref 8–22)
CALCIUM SERPL-MCNC: 8.2 MG/DL (ref 8.5–10.5)
CHLORIDE BLD-SCNC: 107 MMOL/L (ref 98–107)
CO2 SERPL-SCNC: 24 MMOL/L (ref 22–31)
CREAT SERPL-MCNC: 0.65 MG/DL (ref 0.6–1.1)
ERYTHROCYTE [DISTWIDTH] IN BLOOD BY AUTOMATED COUNT: 13.2 % (ref 10–15)
GFR SERPL CREATININE-BSD FRML MDRD: >90 ML/MIN/1.73M2
GLUCOSE BLD-MCNC: 141 MG/DL (ref 70–125)
GLUCOSE BLDC GLUCOMTR-MCNC: 152 MG/DL (ref 70–99)
GLUCOSE BLDC GLUCOMTR-MCNC: 162 MG/DL (ref 70–99)
GLUCOSE BLDC GLUCOMTR-MCNC: 198 MG/DL (ref 70–99)
GLUCOSE BLDC GLUCOMTR-MCNC: 202 MG/DL (ref 70–99)
GLUCOSE BLDC GLUCOMTR-MCNC: 271 MG/DL (ref 70–99)
HCT VFR BLD AUTO: 38.8 % (ref 35–47)
HGB BLD-MCNC: 13.1 G/DL (ref 11.7–15.7)
MCH RBC QN AUTO: 31.1 PG (ref 26.5–33)
MCHC RBC AUTO-ENTMCNC: 33.8 G/DL (ref 31.5–36.5)
MCV RBC AUTO: 92 FL (ref 78–100)
PLATELET # BLD AUTO: 206 10E3/UL (ref 150–450)
POTASSIUM BLD-SCNC: 3.8 MMOL/L (ref 3.5–5)
RBC # BLD AUTO: 4.21 10E6/UL (ref 3.8–5.2)
SODIUM SERPL-SCNC: 139 MMOL/L (ref 136–145)
WBC # BLD AUTO: 14.6 10E3/UL (ref 4–11)

## 2023-04-11 PROCEDURE — 85027 COMPLETE CBC AUTOMATED: CPT | Performed by: FAMILY MEDICINE

## 2023-04-11 PROCEDURE — 250N000011 HC RX IP 250 OP 636: Performed by: FAMILY MEDICINE

## 2023-04-11 PROCEDURE — 250N000013 HC RX MED GY IP 250 OP 250 PS 637: Performed by: PHYSICIAN ASSISTANT

## 2023-04-11 PROCEDURE — 250N000013 HC RX MED GY IP 250 OP 250 PS 637: Performed by: NURSE PRACTITIONER

## 2023-04-11 PROCEDURE — 36415 COLL VENOUS BLD VENIPUNCTURE: CPT | Performed by: FAMILY MEDICINE

## 2023-04-11 PROCEDURE — 120N000001 HC R&B MED SURG/OB

## 2023-04-11 PROCEDURE — 250N000011 HC RX IP 250 OP 636: Performed by: PHYSICIAN ASSISTANT

## 2023-04-11 PROCEDURE — 99232 SBSQ HOSP IP/OBS MODERATE 35: CPT | Performed by: FAMILY MEDICINE

## 2023-04-11 PROCEDURE — 250N000012 HC RX MED GY IP 250 OP 636 PS 637: Performed by: FAMILY MEDICINE

## 2023-04-11 PROCEDURE — 82310 ASSAY OF CALCIUM: CPT | Performed by: FAMILY MEDICINE

## 2023-04-11 PROCEDURE — 99222 1ST HOSP IP/OBS MODERATE 55: CPT | Performed by: NURSE PRACTITIONER

## 2023-04-11 PROCEDURE — 250N000013 HC RX MED GY IP 250 OP 250 PS 637: Performed by: FAMILY MEDICINE

## 2023-04-11 PROCEDURE — 97116 GAIT TRAINING THERAPY: CPT | Mod: GP

## 2023-04-11 PROCEDURE — 97161 PT EVAL LOW COMPLEX 20 MIN: CPT | Mod: GP

## 2023-04-11 RX ORDER — NALOXONE HYDROCHLORIDE 0.4 MG/ML
0.4 INJECTION, SOLUTION INTRAMUSCULAR; INTRAVENOUS; SUBCUTANEOUS
Status: DISCONTINUED | OUTPATIENT
Start: 2023-04-11 | End: 2023-04-12 | Stop reason: HOSPADM

## 2023-04-11 RX ORDER — DEXAMETHASONE SODIUM PHOSPHATE 4 MG/ML
4 INJECTION, SOLUTION INTRA-ARTICULAR; INTRALESIONAL; INTRAMUSCULAR; INTRAVENOUS; SOFT TISSUE ONCE
Status: COMPLETED | OUTPATIENT
Start: 2023-04-11 | End: 2023-04-11

## 2023-04-11 RX ORDER — GABAPENTIN 250 MG/5ML
300 SOLUTION ORAL EVERY 8 HOURS SCHEDULED
Status: DISCONTINUED | OUTPATIENT
Start: 2023-04-11 | End: 2023-04-12 | Stop reason: HOSPADM

## 2023-04-11 RX ORDER — NALOXONE HYDROCHLORIDE 0.4 MG/ML
0.2 INJECTION, SOLUTION INTRAMUSCULAR; INTRAVENOUS; SUBCUTANEOUS
Status: DISCONTINUED | OUTPATIENT
Start: 2023-04-11 | End: 2023-04-12 | Stop reason: HOSPADM

## 2023-04-11 RX ORDER — OXYCODONE HCL 20 MG/ML
5-10 CONCENTRATE, ORAL ORAL EVERY 4 HOURS PRN
Status: DISCONTINUED | OUTPATIENT
Start: 2023-04-11 | End: 2023-04-11

## 2023-04-11 RX ORDER — ACETAMINOPHEN 650 MG/20.3ML
650 LIQUID ORAL EVERY 6 HOURS
Status: DISCONTINUED | OUTPATIENT
Start: 2023-04-11 | End: 2023-04-12 | Stop reason: HOSPADM

## 2023-04-11 RX ORDER — METFORMIN HCL 500 MG
1000 TABLET, EXTENDED RELEASE 24 HR ORAL
Qty: 60 TABLET | Refills: 0 | Status: SHIPPED | OUTPATIENT
Start: 2023-04-11

## 2023-04-11 RX ORDER — OXYCODONE HCL 5 MG/5 ML
5-10 SOLUTION, ORAL ORAL EVERY 4 HOURS PRN
Status: DISCONTINUED | OUTPATIENT
Start: 2023-04-11 | End: 2023-04-12 | Stop reason: HOSPADM

## 2023-04-11 RX ADMIN — OXYCODONE HYDROCHLORIDE 10 MG: 5 TABLET ORAL at 01:19

## 2023-04-11 RX ADMIN — ACETAMINOPHEN 650 MG: 650 SOLUTION ORAL at 15:11

## 2023-04-11 RX ADMIN — ACETAMINOPHEN 975 MG: 325 TABLET ORAL at 08:11

## 2023-04-11 RX ADMIN — HYDROXYZINE HYDROCHLORIDE 25 MG: 25 TABLET ORAL at 17:54

## 2023-04-11 RX ADMIN — OXYCODONE HYDROCHLORIDE 10 MG: 5 TABLET ORAL at 12:37

## 2023-04-11 RX ADMIN — GABAPENTIN 300 MG: 300 CAPSULE ORAL at 08:11

## 2023-04-11 RX ADMIN — INSULIN ASPART 2 UNITS: 100 INJECTION, SOLUTION INTRAVENOUS; SUBCUTANEOUS at 17:53

## 2023-04-11 RX ADMIN — POLYETHYLENE GLYCOL 3350 17 G: 17 POWDER, FOR SOLUTION ORAL at 08:11

## 2023-04-11 RX ADMIN — GABAPENTIN 300 MG: 250 SOLUTION ORAL at 15:11

## 2023-04-11 RX ADMIN — INSULIN GLARGINE 12 UNITS: 100 INJECTION, SOLUTION SUBCUTANEOUS at 09:02

## 2023-04-11 RX ADMIN — THERA TABS 1 TABLET: TAB at 08:11

## 2023-04-11 RX ADMIN — BENZOCAINE AND MENTHOL 1 LOZENGE: 15; 3.6 LOZENGE ORAL at 01:10

## 2023-04-11 RX ADMIN — ACETAMINOPHEN 650 MG: 650 SOLUTION ORAL at 19:54

## 2023-04-11 RX ADMIN — ACETAMINOPHEN AND CODEINE PHOSPHATE 0.35 MG: 120; 12 SOLUTION ORAL at 19:58

## 2023-04-11 RX ADMIN — HYDROXYZINE HYDROCHLORIDE 25 MG: 25 TABLET ORAL at 08:11

## 2023-04-11 RX ADMIN — INSULIN ASPART 3 UNITS: 100 INJECTION, SOLUTION INTRAVENOUS; SUBCUTANEOUS at 12:36

## 2023-04-11 RX ADMIN — Medication 1 ML: at 19:56

## 2023-04-11 RX ADMIN — SENNOSIDES AND DOCUSATE SODIUM 1 TABLET: 50; 8.6 TABLET ORAL at 19:55

## 2023-04-11 RX ADMIN — BENZOCAINE AND MENTHOL 1 LOZENGE: 15; 3.6 LOZENGE ORAL at 09:03

## 2023-04-11 RX ADMIN — DEXAMETHASONE SODIUM PHOSPHATE 4 MG: 4 INJECTION, SOLUTION INTRAMUSCULAR; INTRAVENOUS at 09:02

## 2023-04-11 RX ADMIN — Medication 1 ML: at 17:54

## 2023-04-11 RX ADMIN — HYDROXYZINE HYDROCHLORIDE 25 MG: 25 TABLET ORAL at 01:19

## 2023-04-11 RX ADMIN — GABAPENTIN 300 MG: 250 SOLUTION ORAL at 22:43

## 2023-04-11 RX ADMIN — SENNOSIDES AND DOCUSATE SODIUM 1 TABLET: 50; 8.6 TABLET ORAL at 08:11

## 2023-04-11 RX ADMIN — ACETAMINOPHEN 975 MG: 325 TABLET ORAL at 01:10

## 2023-04-11 RX ADMIN — INSULIN ASPART 1 UNITS: 100 INJECTION, SOLUTION INTRAVENOUS; SUBCUTANEOUS at 09:02

## 2023-04-11 RX ADMIN — CEFAZOLIN SODIUM 2 G: 2 INJECTION, SOLUTION INTRAVENOUS at 01:10

## 2023-04-11 RX ADMIN — Medication 1 ML: at 15:11

## 2023-04-11 ASSESSMENT — ACTIVITIES OF DAILY LIVING (ADL)
ADLS_ACUITY_SCORE: 37

## 2023-04-11 NOTE — PLAN OF CARE
Problem: Spinal Surgery  Goal: Absence of Bleeding  Outcome: Progressing   Goal Outcome Evaluation:  Pt a/o x4. SBA. Cx pain in throat that occurs with swallowing, Speech to see pt . Per pain team, medications switched to solutions. PRN atarax, oxycodone, lozenges, throat spray. Appears to be resting comfortably. Visit by mother and son today. PIV saline locked. Scant drainage to dressing. Insulin per parameters. Appropriately calls with needs.

## 2023-04-11 NOTE — CONSULTS
"[facility] ACUTE PAIN SERVICE CONSULTATION     Date of Admission:  4/10/2023  Date of Consult (When I saw the patient): 04/11/23  Physician requesting consult: Nayana Mckeon PA-C  Reason for consult: Post op pain cervical  Primary Care Physician: Brandy Pearson NP     Assessment/Plan:     Eleanor Elizabeth is a 47 year old female who was admitted on 4/10/2023.  Pain team was asked to see the patient for Post op pain cervical. Admitted for C5-6 anterior disc replacement and C6-7 anterior discectomy and fusion. History of diabetes type 2, obesity. Pain is in the throat, describes pain as \"razor blades going down my throat,\" severe, hurts to swallow, clear her throat, cough. The patient does not smoke and denies chemical dependency history.     Post op day: 1 Day Post-Op.     Opioid Induced Respiratory Depression Risk Assessment:?  Moderate due to the following risk factors: Obesity, opioid naive status, post surgical ?     PLAN:   1) Pain is consistent with acute post op pain s/p C5-6 anterior disc replacement and C6-7 anterior discectomy and fusion. I have personally reviewed pertinent notes, labs, tests, and radiologic imaging in patient's chart. Treatment plan includes multimodal pain approach, Hospital Medicine Service for medical management, Ortho for post-op management. Patient educated regarding multimodal pain approach, medications as listed below, watch for constipation and stool softeners. Patient is understanding of the plan. All questions and concerns addressed to patient's satisfaction. Medications switched to solution form as allows for comfort.   2)Multimodal Medication Therapy  Topical: None  NSAID'S: None   Muscle Relaxants: None  Adjuvants: Tylenol 650mg q6h and q4h PRN, Gabapentin 300mg q8h, Cepachol lozenge PRN (patient reports not very helpful), Chloraseptic spray added for throat pain  Antidepressants/anxiolytics: Hydroxyzine 25mg q6h PRN  Opioids: Oxycodone 5-10mg q4h " "PRN  IV Pain medication: Hydromorphone 0.2-0.4mg q2h PRN  3)Non-medication interventions: ice, SLP consult  Acupuncture consult - offered and agreeable, consult placed  Integrative consult - offered and agreeable, consult placed  4)Constipation Prophylaxis: Scheduled and prn: Senna, Miralax, PRN suppository  5) Care Teams: Ortho, Hospital Medicine, SLP    -Opioid prescriber has been None  -MN  pulled from system on 4/11/23. This indicates   3/21/23 Tramadol 50 mg #20   3/5/253 Gabapentin 300 mg #90 - same on 2/6/23  Discharge Recommendations - We recommend prescribing the following at the time of discharge:  Scripts per ortho - would recommend to send with tablets rather than solutions at discharge for meds.      History of Present Illness (HPI):       Eleanor Elizabeth is a 47 year old female who presented for C5-6 anterior disc replacement and C6-7 anterior discectomy and fusion. Past medical history as above. The pain is reported to be acute throat pain. Current pain is rated as \"extreme\" like \"razor blades are going down my throat\" with any swallowing, clearing of the throat, or coughing.     Per MN  review, the patient does not have an opioid tolerance. Opioid induced side effects noted and include:none     Reviewed medical record, labs, imaging, ED note, and care everywhere.     Past pain treatments have included: APAP, Tramadol, Gabapentin     Home pain medications/psych medications/anticoagulation medications include: Gabapentin 300mg TID      Medical History   PAST MEDICAL HISTORY:   Past Medical History:   Diagnosis Date     Diabetes (H)      Obese      PONV (postoperative nausea and vomiting)        PAST SURGICAL HISTORY:   Past Surgical History:   Procedure Laterality Date     DISCECTOMY, SPINE, CERVICAL, 2 LEVELS, ANTERIOR APPROACH, WITH INTERVERTEBRAL DISC REPLACEMENT Bilateral 4/10/2023    Procedure: CERVICAL 5-6 ANTERIOR DISC REPLACEMENT CERVICAL 6-7 ANTERIOR DISCECTOMY AND FUSION;  Surgeon: " "Barber Llamas MD;  Location: [facility] Main OR       FAMILY HISTORY: History reviewed. No pertinent family history.    SOCIAL HISTORY:   Social History     Tobacco Use     Smoking status: Never     Smokeless tobacco: Never   Vaping Use     Vaping status: Not on file   Substance Use Topics     Alcohol use: Never        HEALTH & LIFESTYLE PRACTICES  Tobacco:  reports that she has never smoked. She has never used smokeless tobacco.  Alcohol:  reports no history of alcohol use.  Illicit drugs:  reports no history of drug use.    Allergies  No Known Allergies    Problem List  Patient Active Problem List    Diagnosis Date Noted     Lumbar spinal stenosis 04/10/2023     Priority: Medium       Prior to Admission Medications   Medications Prior to Admission   Medication Sig Dispense Refill Last Dose     aspirin (ASA) 81 MG EC tablet Take 1 tablet by mouth daily   4/2/2023     cholecalciferol (VITAMIN D3) 10 mcg (400 units) TABS tablet Take 10 mcg by mouth daily   4/2/2023     gabapentin (NEURONTIN) 300 MG capsule Take 300 mg by mouth 3 times daily   4/10/2023 at am     Multiple Vitamins-Iron (MULTIPLE VITAMIN/IRON OR) Take 1 tablet by mouth daily   4/2/2023     norethindrone (MICRONOR) 0.35 MG tablet Take 0.35 mg by mouth At Bedtime   4/9/2023     [DISCONTINUED] metFORMIN (GLUCOPHAGE XR) 500 MG 24 hr tablet Take 500 mg by mouth daily (with dinner)   4/8/2023       Review of Systems  Complete ROS reviewed, unless noted in HPI, all other systems reviewed (with patient) and all others found to be negative.      Objective:     Physical Exam:  BP (!) 150/79 (BP Location: Right arm)   Pulse 77   Temp 98.2  F (36.8  C) (Oral)   Resp 16   Ht 1.613 m (5' 3.5\")   Wt 124.3 kg (274 lb)   SpO2 95%   BMI 47.78 kg/m    Weight:   Vitals:    04/05/23 0935 04/10/23 0821   Weight: 123.8 kg (273 lb) 124.3 kg (274 lb)      Body mass index is 47.78 kg/m .    General Appearance:  Alert, cooperative, no distress, appears stated age  "   Head:  Normocephalic, without obvious abnormality, atraumatic   Eyes:  PERRL, conjunctiva/corneas clear, EOM's intact   ENT/Throat: Lips, mucosa, and tongue normal; teeth and gums normal   Lymph/Neck: Incision covered    Lungs:   Clear to auscultation bilaterally, respirations unlabored   Chest Wall:  No tenderness or deformity   Cardiovascular/Heart:  Regular rate and rhythm, S1, S2 normal,no murmur, rub or gallop.    Abdomen:   Soft, non-tender, bowel sounds active all four quadrants,  no masses, no organomegaly   Musculoskeletal: Extremities normal, atraumatic   Skin: Skin color, texture, turgor normal, no rashes or lesions   Neurologic: Alert and oriented X 3, Moves all 4 extremities     Psych: Affect is cooperative.      Imaging: Reviewed I have personally reviewed pertinent labs, tests, and radiologic imaging in patient's chart.  XR Surgery RAJWINDER  Fluoro G/T 5 Min    Result Date: 4/10/2023  This exam was marked as non-reportable because it will not be read by a radiologist or a [facility] non-radiologist provider.       Labs: Reviewed I have personally reviewed pertinent labs, tests, and radiologic imaging in patient's chart.  Recent Results (from the past 24 hour(s))   Glucose by meter    Collection Time: 04/10/23  2:21 PM   Result Value Ref Range    GLUCOSE BY METER POCT 212 (H) 70 - 99 mg/dL   Glucose by meter    Collection Time: 04/10/23  6:08 PM   Result Value Ref Range    GLUCOSE BY METER POCT 205 (H) 70 - 99 mg/dL   Glucose by meter    Collection Time: 04/10/23 10:15 PM   Result Value Ref Range    GLUCOSE BY METER POCT 178 (H) 70 - 99 mg/dL   Glucose by meter    Collection Time: 04/11/23  1:45 AM   Result Value Ref Range    GLUCOSE BY METER POCT 198 (H) 70 - 99 mg/dL   CBC with platelets    Collection Time: 04/11/23  6:31 AM   Result Value Ref Range    WBC Count 14.6 (H) 4.0 - 11.0 10e3/uL    RBC Count 4.21 3.80 - 5.20 10e6/uL    Hemoglobin 13.1 11.7 - 15.7 g/dL    Hematocrit 38.8 35.0 - 47.0 %    MCV 92  78 - 100 fL    MCH 31.1 26.5 - 33.0 pg    MCHC 33.8 31.5 - 36.5 g/dL    RDW 13.2 10.0 - 15.0 %    Platelet Count 206 150 - 450 10e3/uL   Basic metabolic panel    Collection Time: 04/11/23  6:31 AM   Result Value Ref Range    Sodium 139 136 - 145 mmol/L    Potassium 3.8 3.5 - 5.0 mmol/L    Chloride 107 98 - 107 mmol/L    Carbon Dioxide (CO2) 24 22 - 31 mmol/L    Anion Gap 8 5 - 18 mmol/L    Urea Nitrogen 10 8 - 22 mg/dL    Creatinine 0.65 0.60 - 1.10 mg/dL    Calcium 8.2 (L) 8.5 - 10.5 mg/dL    Glucose 141 (H) 70 - 125 mg/dL    GFR Estimate >90 >60 mL/min/1.73m2   Glucose by meter    Collection Time: 04/11/23  9:01 AM   Result Value Ref Range    GLUCOSE BY METER POCT 162 (H) 70 - 99 mg/dL   Glucose by meter    Collection Time: 04/11/23 12:36 PM   Result Value Ref Range    GLUCOSE BY METER POCT 271 (H) 70 - 99 mg/dL       Total time spent 65 minutes with greater than 50% in consultation, education and coordination of care.     Also discussed with RN Desiree.     Please see A&P for additional details of medical decision making.    Elements of Medical Decision Making as described above. High risk therapy including opioids, high risk drug therapy including oral and/or parenteral controlled substances    Thank you for this consultation.    I was present with Jess Sorenson the CNS DNP student who participated in the service and in the documentation of the note. I have verified the history and personally performed the physical exam and medical decision making. I agree with the assessment and plan of care as documented in the note.    WALTER Acevedo CNP   4/11/2023       Jess WATSON - CNS Student  [employer]  ---------------------------  Margie WATSON, FNP-C  Acute Care Pain Management Program  [facility] ([place], [place], [place])  Monday-Friday 8a-4p   Page via online Powers[employer] system or [employer]

## 2023-04-11 NOTE — PROGRESS NOTES
[facility] MEDICINE PROGRESS NOTE      Identification/Summary: Eleanor Elizabeth is a 47 year old female with a past medical history of DM2, underwent C5-6, C6/7 discectomy and nerve decompression.  POD 1.  Neck pain is stable.  Feeling sharp pain while swallowing.    Assessment and Plan:  DM2  Metformin 500 mg daily, on hold  Hemoglobin A1c 7  Diabetic diet and insulin sliding scale    Elevated blood pressure  Monitor blood pressure closely  Not on antihypertensives    Morbid obesity, BMI 47.78 kg/m   Modification of lifestyle for weight loss  Outpatient sleep study to rule out obstructive sleep apnea    Dysphagia  Swallow evaluation   Dexamethasone 4 mg once    Status post C5-6, C6-7 discectomy and nerve decompression  Resume routine postoperative care  Physical and Occupational Therapy  Weightbearing status: No lifting greater than a full gallon of milk x6 weeks. WBAT bilateral lower extremities.  Use incentive spirometry frequently  DVT prophylaxis per orthopedics with early ambulation and SCDs  Pain control with Tylenol 975 mg every 8 hours, 650 mg every 4 hours as needed, oxycodone 5 to 10 mg every 4 hours as needed  Hemoglobin is stable at 13      Ce Vail MD  [facility] Medicine  [facility]  Phone: #[phone]  Securely message with the Vocera Web Console (learn more here)  Text page via [employer] Paging/Directory     Interval History/Subjective:  Resting comfortably.  Neck pain is stable.  No new radiculopathy.  Has dysphagia feeling swallowing razor blade.  Denies headache, chest pain, breathing difficulty, palpitation, nausea, vomiting or abdominal pain.    Physical Exam/Objective:  Temp:  [96.8  F (36  C)-98.5  F (36.9  C)] 98.3  F (36.8  C)  Pulse:  [68-97] 75  Resp:  [12-20] 16  BP: (135-180)/(62-83) 146/68  Cuff Mean (mmHg):  [102] 102  SpO2:  [93 %-99 %] 95 %  Body mass index is 47.78 kg/m .    GENERAL:  Alert, appears comfortable,  in no acute distress, appears stated age, obese   HEAD:  Normocephalic, without obvious abnormality, atraumatic   EYES:  PERRL, conjunctiva  clear,  EOM's intact   NOSE: Nares normal, mucosa normal, no drainage   THROAT: Lips, mucosa,  gums normal, mouth moist   NECK: S/P anterior neck surgery, dressing in place   BACK:   Symmetric, no curvature, ROM normal   LUNGS:   Clear to auscultation bilaterally, no rales, rhonchi, or wheezing, symmetric chest rise on inhalation, respirations unlabored   CHEST WALL:  No tenderness or deformity   HEART:  Regular rate and rhythm, S1 and S2 normal, no murmur    ABDOMEN:   Soft, non-tender, bowel sounds active , no masses, no organomegaly, no rebound or guarding   EXTREMITIES: No   edema    SKIN: No rashes   NEURO: Alert, oriented x3, moves all four extremities freely   PSYCH: Cooperative, behavior is appropriate      Data reviewed today: I personally reviewed all new medications, labs, imaging/diagnostics reports over the past 24 hours. Pertinent findings include:    Labs:  Most Recent 3 CBC's:Recent Labs   Lab Test 04/11/23  0631   WBC 14.6*   HGB 13.1   MCV 92        Most Recent 3 BMP's:Recent Labs   Lab Test 04/11/23  0901 04/11/23  0631 04/11/23  0145   NA  --  139  --    POTASSIUM  --  3.8  --    CHLORIDE  --  107  --    CO2  --  24  --    BUN  --  10  --    CR  --  0.65  --    ANIONGAP  --  8  --    VICKY  --  8.2*  --    * 141* 198*       Medications:   Personally Reviewed.  Medications     sodium chloride 125 mL/hr at 04/10/23 9489       acetaminophen  975 mg Oral Q8H     gabapentin  300 mg Oral TID     insulin aspart  1-7 Units Subcutaneous TID AC     multivitamin, therapeutic  1 tablet Oral Daily     norethindrone  0.35 mg Oral At Bedtime     polyethylene glycol  17 g Oral Daily     senna-docusate  1 tablet Oral BID     Total time spent 40 min on the date of service doing chart review, history, exam, documentation & further activities per the note.

## 2023-04-11 NOTE — CONSULTS
Integrative Therapy Consult    Healing PresenceYes  Essential Oils: Inhalation (EO/Topical oil)     Support Healing process blend - HC       Healing Music: TV/Care channel     Breathwork:       Guided Imagery:       Acupressure:       Oshibori:       Energy Therapy: Healing touch     Healing Touch:       Reiki:       Qi Gong:     Massage:        Targeted Massage:    Sleep Promotion:       Other Therapy:       Intervention Reason: Pain     Pre and Post Session Scores: Patient Desires Treatment: yes           Pre-session Pain: 5 Post-session Pain: asleep               Delivery:         Referrals:      Mary Carmen Blackwell    Pt fell asleep.

## 2023-04-11 NOTE — PROGRESS NOTES
OT: Orders received. Chart reviewed and discussed with care team. OT not indicated due to pt being Mod I w/ ADLs post op, per PT. Defer discharge recommendations to PT and rest of care team. Will complete orders.

## 2023-04-11 NOTE — PROGRESS NOTES
Physical Therapy Discharge Summary    Reason for therapy discharge:    All goals and outcomes met, no further needs identified.    Progress towards therapy goal(s). See goals on Care Plan in [facility] electronic health record for goal details.  Goals met    Therapy recommendation(s):    Continue home exercise program.

## 2023-04-11 NOTE — PROGRESS NOTES
04/11/23 0800   Appointment Info   Signing Clinician's Name / Credentials (PT) Sury Lott, PT, DPT   Living Environment   People in Home alone   Current Living Arrangements house   Home Accessibility stairs to enter home;stairs within home   Stair Railings, Main Entrance railings on both sides of stairs   Number of Stairs, Within Home, Primary seven   Stair Railings, Within Home, Primary railings on both sides of stairs   Living Environment Comments pt plans to have mother stay for a few days   Self-Care   Equipment Currently Used at Home none   General Information   Onset of Illness/Injury or Date of Surgery 04/10/23   Referring Physician Dr. Barber Llamas   Patient/Family Therapy Goals Statement (PT) Get moving   Pertinent History of Current Problem (include personal factors and/or comorbidities that impact the POC) S/P C5-6 Disc Replacement and C6-7 Anterior Fusion   Existing Precautions/Restrictions lifting  (Soft collar for comfort)   Weight-Bearing Status - LLE full weight-bearing   Weight-Bearing Status - RLE full weight-bearing   Bed Mobility   Bed Mobility supine-sit   Supine-Sit [place] (Bed Mobility) supervision   Transfers   Transfers sit-stand transfer   Maintains Weight-bearing Status (Transfers) able to maintain   Sit-Stand Transfer   Sit-Stand [place] (Transfers) supervision;verbal cues   Assistive Device (Sit-Stand Transfers)   (None)   Gait/Stairs (Locomotion)   [place] Level (Gait) supervision;verbal cues   Assistive Device (Gait)   (None)   Distance in Feet 10   Distance in Feet (Gait) 100, 250   Pattern (Gait) step-through   Deviations/Abnormal Patterns (Gait) base of support, wide;colleen decreased   Maintains Weight-bearing Status (Gait) able to maintain   Clinical Impression   Criteria for Skilled Therapeutic Intervention Yes, treatment indicated   PT Diagnosis (PT) Impaired functional mobility   Influenced by the following impairments weakness, pain   Functional  limitations due to impairments transfers, gait, stairs   Clinical Presentation (PT Evaluation Complexity) Stable/Uncomplicated   Clinical Presentation Rationale Pt presents as medically diagnosed   Clinical Decision Making (Complexity) low complexity   Planned Therapy Interventions (PT) transfer training;gait training;home exercise program;stair training   Anticipated Equipment Needs at Discharge (PT)   (None)   Risk & Benefits of therapy have been explained evaluation/treatment results reviewed;care plan/treatment goals reviewed;patient   PT Total Evaluation Time   PT Eval, Low Complexity Minutes (01545) 10   Physical Therapy Goals   PT Frequency One time eval and treatment only   PT Predicted Duration/Target Date for Goal Attainment 04/11/23   PT Goals Gait;Stairs   PT: Gait Independent;150 feet;Goal Met   PT: Stairs 4 stairs;Rail on both sides;Goal Met;Supervision/stand-by assist   Interventions   Interventions Quick Adds Gait Training;Therapeutic Activity;Therapeutic Procedure   Therapeutic Procedure/Exercise   Treatment Detail/Skilled Intervention Reviewed cervical spine exercises with no brace indicated due to cervical collar for comfort   Therapeutic Activity   Symptoms Noted During/After Treatment None   Treatment Detail/Skilled Intervention Supine to sit with HOB elevated and use of rail, log roll technique, SBA. Sit<>Stand with no AD, SBA. Toilet transfer with no AD, independent.   Gait Training   Gait Training Minutes (75494) 10   Symptoms Noted During/After Treatment (Gait Training) none   Treatment Detail/Skilled Intervention verbal cueing for safety. Education on ambulation post-op and changing positions frequently at home. Stairs: 4 stairs with bilateral rails, SBA, safe technique. Reviewed car transfers. No concerns related to dressing or shower transfers   [place] Level (Gait Training) independent   Physical Assistance Level (Gait Training) supervision   Weight Bearing (Gait Training) full  weight-bearing   Assistive Device (Gait Training)   (None)   Pattern Analysis (Gait Training) swing-through gait   Gait Analysis Deviations increased stride width;decreased step length   PT Discharge Planning   PT Discharge Recommendation (DC Rec) (S)  home with assist   PT Rationale for DC Rec Patient ambulating and negotiating stairs safely, support from mother at home   PT Brief overview of current status Amb 250ft with no AD, independent   Total Session Time   Timed Code Treatment Minutes 10   Total Session Time (sum of timed and untimed services) 20

## 2023-04-11 NOTE — PROGRESS NOTES
Care Management Follow Up    Length of Stay (days): 1    Expected Discharge Date: 04/11/2023     Concerns to be Addressed:     Discharge summary    Additional Information:  CM reviewed chart. Patient moving fairly well. Lives alone, however her mother will be staying with her after going home. Patient independent at baseline. Anticipate she will return home, with family support and family to provide a ride at discharge. No needs identified.      Eleanor Merchant RN

## 2023-04-11 NOTE — PROGRESS NOTES
"Orthopedic Progress Note      Assessment: 1 Day Post-Op  S/P Procedure(s):  CERVICAL 5-6 ANTERIOR DISC REPLACEMENT CERVICAL 6-7 ANTERIOR DISCECTOMY AND FUSION @    Plan:   -Continue PT/OT.   -Weightbearing status: No lifting greater than a full gallon of milk x6 weeks. WBAT bilateral lower extremities.  -DVT prophylaxis with SCDs, cammie stockings and early ambulation.  -Anticoagulation: None due to Spine surgery.   -NO aspirin, anticoagulation or NSAIDS (advil/ibuprofen, aleve/naproxen, celebrex) for 5 days after surgery.  -Encourage IS.  -Appreciate hospitalist recs.  - Pain management consult  - Speech consult for difficulty swallowing  -Discharge planning: pending pain control and swallowing     Subjective:  Pain: Not well controlled   Nausea, Vomiting:  no  Chest pain: No  Lightheadedness, Dizziness:  no  Neuro:  Patient denies new onset numbness or paresthesias in bilateral upper extremities     Patient doing okay on POD #1.   Her symptoms that she had prior to surgery are completely resolved at this time.  Although she is reporting that her pain is not very well controlled.  She has anterior cervical neck pain as well as difficulty with swallowing.  She notes that every time she swallows it burns and feels like she is swallowing razor blades.  I offered her lozenges and these have not been helping her symptoms.  I will put in a speech consult for her difficulty swallowing.  I also put in a pain management consult to help with her pain     Objective:  /67   Pulse (!) 46   Temp 97.4  F (36.3  C) (Oral)   Resp 16   Ht 1.676 m (5' 6\")   Wt 139.7 kg (308 lb)   LMP  (LMP Unknown)   SpO2 96%   BMI 49.71 kg/m    The patient is A&Ox3. Appears comfortable.   Sensation is intact to light touch in bilateral upper extremities in C5, C6, C7 & C8 distributions  Motor: +5/5 elbow flexion, elbow extension, wrist flexion, wrist extension bilaterally. Equal  strength bilaterally. Full composite fist bilaterally. " Opposes thumb.  Palpable Left radial pulse  Calves are soft and non-tender bilaterally. Negative Eladio's.  Anterior neck incision is c/d/i with steri's in place, no hematoma      Pertinent Labs   Lab Results: personally reviewed.   No results found for: INR, PROTIME  Lab Results   Component Value Date    WBC 14.6 (H) 04/11/2023    HGB 13.1 04/11/2023    HCT 38.8 04/11/2023    MCV 92 04/11/2023     04/11/2023     Lab Results   Component Value Date     04/11/2023    CO2 24 04/11/2023         Report completed by:  Nayana Mckeon PA-C/Dr. Llamas  [facility]    Date: 4/11/2023  Time: 9:44 AM

## 2023-04-11 NOTE — PLAN OF CARE
Problem: Plan of Care - These are the overarching goals to be used throughout the patient stay.    Goal: Optimal Comfort and Wellbeing  Outcome: Progressing  Intervention: Monitor Pain and Promote Comfort    Problem: Spinal Surgery  Goal: Optimal Functional Ability  Intervention: Optimize Functional Status     Goal Outcome Evaluation:       Patient vital signs are at baseline: Yes  Patient able to ambulate as they were prior to admission or with assist devices provided by therapies during their stay:  Yes  Patient MUST void prior to discharge:  Yes  Patient able to tolerate oral intake:  Yes  Pain has adequate pain control using Oral analgesics:  Yes  Does patient have an identified :  Yes  Has goal D/C date and time been discussed with patient:  Yes      Pt A/Ox4, able to make needs known. Reports of severe pain in throat when swallowing that radiates to neck. PRN lozenges, PRN oxycodone, scheduled pain meds and ice utilized. SBA. IV fluids running at 125 ml/hr. Passed PVRs. CMS intact. Scant drainage present on dressing. VSS, on room air.

## 2023-04-12 ENCOUNTER — APPOINTMENT (OUTPATIENT)
Dept: SPEECH THERAPY | Facility: CLINIC | Age: 48
DRG: 472 | End: 2023-04-12
Payer: COMMERCIAL

## 2023-04-12 VITALS
DIASTOLIC BLOOD PRESSURE: 91 MMHG | BODY MASS INDEX: 46.78 KG/M2 | OXYGEN SATURATION: 96 % | RESPIRATION RATE: 15 BRPM | SYSTOLIC BLOOD PRESSURE: 145 MMHG | TEMPERATURE: 98.6 F | WEIGHT: 274 LBS | HEART RATE: 90 BPM | HEIGHT: 64 IN

## 2023-04-12 LAB
GLUCOSE BLDC GLUCOMTR-MCNC: 120 MG/DL (ref 70–99)
GLUCOSE BLDC GLUCOMTR-MCNC: 134 MG/DL (ref 70–99)

## 2023-04-12 PROCEDURE — 92610 EVALUATE SWALLOWING FUNCTION: CPT | Mod: GN

## 2023-04-12 PROCEDURE — 250N000013 HC RX MED GY IP 250 OP 250 PS 637: Performed by: NURSE PRACTITIONER

## 2023-04-12 PROCEDURE — 250N000013 HC RX MED GY IP 250 OP 250 PS 637: Performed by: PHYSICIAN ASSISTANT

## 2023-04-12 PROCEDURE — 250N000013 HC RX MED GY IP 250 OP 250 PS 637: Performed by: FAMILY MEDICINE

## 2023-04-12 PROCEDURE — 99232 SBSQ HOSP IP/OBS MODERATE 35: CPT | Performed by: NURSE PRACTITIONER

## 2023-04-12 PROCEDURE — 99232 SBSQ HOSP IP/OBS MODERATE 35: CPT | Performed by: FAMILY MEDICINE

## 2023-04-12 PROCEDURE — 92526 ORAL FUNCTION THERAPY: CPT | Mod: GN

## 2023-04-12 RX ORDER — ACETAMINOPHEN 325 MG/1
650 TABLET ORAL EVERY 4 HOURS PRN
Qty: 100 TABLET | Refills: 0 | Status: SHIPPED | OUTPATIENT
Start: 2023-04-13

## 2023-04-12 RX ORDER — OXYCODONE HYDROCHLORIDE 5 MG/1
5 TABLET ORAL EVERY 6 HOURS PRN
Qty: 30 TABLET | Refills: 0 | Status: SHIPPED | OUTPATIENT
Start: 2023-04-12 | End: 2023-04-20

## 2023-04-12 RX ORDER — HYDROXYZINE HYDROCHLORIDE 25 MG/1
25 TABLET, FILM COATED ORAL EVERY 6 HOURS PRN
Qty: 30 TABLET | Refills: 0 | Status: SHIPPED | OUTPATIENT
Start: 2023-04-12

## 2023-04-12 RX ORDER — LISINOPRIL 10 MG/1
10 TABLET ORAL DAILY
Qty: 30 TABLET | Refills: 1 | Status: SHIPPED | OUTPATIENT
Start: 2023-04-12

## 2023-04-12 RX ORDER — LISINOPRIL 10 MG/1
10 TABLET ORAL DAILY
Status: DISCONTINUED | OUTPATIENT
Start: 2023-04-12 | End: 2023-04-12 | Stop reason: HOSPADM

## 2023-04-12 RX ADMIN — GABAPENTIN 300 MG: 250 SOLUTION ORAL at 05:09

## 2023-04-12 RX ADMIN — BENZOCAINE AND MENTHOL 1 LOZENGE: 15; 3.6 LOZENGE ORAL at 12:23

## 2023-04-12 RX ADMIN — Medication 1 ML: at 08:20

## 2023-04-12 RX ADMIN — SENNOSIDES AND DOCUSATE SODIUM 1 TABLET: 50; 8.6 TABLET ORAL at 08:08

## 2023-04-12 RX ADMIN — LISINOPRIL 10 MG: 10 TABLET ORAL at 12:23

## 2023-04-12 RX ADMIN — POLYETHYLENE GLYCOL 3350 17 G: 17 POWDER, FOR SOLUTION ORAL at 08:07

## 2023-04-12 RX ADMIN — OXYCODONE HYDROCHLORIDE 5 MG: 5 SOLUTION ORAL at 08:08

## 2023-04-12 RX ADMIN — BENZOCAINE AND MENTHOL 1 LOZENGE: 15; 3.6 LOZENGE ORAL at 08:19

## 2023-04-12 RX ADMIN — Medication 1 ML: at 05:10

## 2023-04-12 RX ADMIN — ACETAMINOPHEN 650 MG: 650 SOLUTION ORAL at 08:08

## 2023-04-12 RX ADMIN — THERA TABS 1 TABLET: TAB at 08:08

## 2023-04-12 ASSESSMENT — ACTIVITIES OF DAILY LIVING (ADL)
ADLS_ACUITY_SCORE: 37

## 2023-04-12 NOTE — PROGRESS NOTES
"Orthopedic Progress Note      Assessment: 2 Day Post-Op  S/P Procedure(s):  CERVICAL 5-6 ANTERIOR DISC REPLACEMENT CERVICAL 6-7 ANTERIOR DISCECTOMY AND FUSION @    Plan:   -Continue PT/OT.   -Weightbearing status: No lifting greater than a full gallon of milk x6 weeks. WBAT bilateral lower extremities.  -DVT prophylaxis with SCDs, cammie stockings and early ambulation.  -Anticoagulation: None due to Spine surgery.   -NO aspirin, anticoagulation or NSAIDS (advil/ibuprofen, aleve/naproxen, celebrex) for 5 days after surgery.  -Encourage IS.  -Appreciate hospitalist recs.  -Discharge planning: pending advancement in solid foods, likely afternoon     Subjective:  Pain: Not well controlled   Nausea, Vomiting:  no  Chest pain: No  Lightheadedness, Dizziness:  no  Neuro:  Patient denies new onset numbness or paresthesias in bilateral upper extremities     Patient doing okay on POD #2.   Her symptoms that she had prior to surgery are completely resolved at this time.  She reports that her pain is much better controlled this morning.  She notes her throat pain is also improving.  Speech did see her this morning and noted that her swallowing was mainly due to pain and not difficulty swallowing.  She has only been eating soft foods pudding, liquid diet.  I did asked that she try some more solid foods for lunch and depending how those go we could likely get her home this afternoon.     Objective:  /67   Pulse (!) 46   Temp 97.4  F (36.3  C) (Oral)   Resp 16   Ht 1.676 m (5' 6\")   Wt 139.7 kg (308 lb)   LMP  (LMP Unknown)   SpO2 96%   BMI 49.71 kg/m    The patient is A&Ox3. Appears comfortable.   Sensation is intact to light touch in bilateral upper extremities in C5, C6, C7 & C8 distributions  Motor: +5/5 elbow flexion, elbow extension, wrist flexion, wrist extension bilaterally. Equal  strength bilaterally. Full composite fist bilaterally. Opposes thumb.  Palpable Left radial pulse  Calves are soft and " non-tender bilaterally. Negative Eladio's.  Anterior neck incision is c/d/i with steri's in place, no hematoma      Pertinent Labs   Lab Results: personally reviewed.   No results found for: INR, PROTIME  Lab Results   Component Value Date    WBC 14.6 (H) 04/11/2023    HGB 13.1 04/11/2023    HCT 38.8 04/11/2023    MCV 92 04/11/2023     04/11/2023     Lab Results   Component Value Date     04/11/2023    CO2 24 04/11/2023         Report completed by:  Nayana Mckeon PA-C/Dr. Llamas  [facility]    04/12/23

## 2023-04-12 NOTE — DISCHARGE SUMMARY
ORTHOPEDIC DISCHARGE SUMMARY       Eleanor Elizabeth,  1975, MRN [id number]    Admission Date: 4/10/2023      Admission Diagnoses: Cervical radiculopathy [M54.12]  Myelopathy (H) [G95.9]  Spinal stenosis of cervical region [M48.02]  Lumbar spinal stenosis [M48.061]     Discharge Date:  23     Post-operative Day:  2 Days Post-Op    Reason for Admission: The patient was admitted for the following: Procedure(s):  CERVICAL 5-6 ANTERIOR DISC REPLACEMENT CERVICAL 6-7 ANTERIOR DISCECTOMY AND FUSION    BRIEF HOSPITAL COURSE   Eleanor Elizabeth is a pleasant 47 year old female who underwent the aforementioned procedure with Dr. Llamas on . There were no intraoperative complications and the patient was transferred to the recovery room and later the orthopedic unit in stable condition. Once the patient reached the orthopedic floor our orthopedic pain protocol was implemented along with the following:    Anticoagulation Medications: ASA starting on 23 and None  Therapy: PT and OT  Activity: WBAT  Bracing: None    Consultations during Admission: Hospitalist service for medical management     COMPLICATIONS/SIGNIFICANT FINDINGS    NONE    DISCHARGE INFORMATION   Condition at discharge: Good  Discharge destination: Home  Patient was seen by myself on the date of discharge.    FOLLOW UP CARE   Follow up with orthopedics in 2 weeks or sooner should the need arise. Ortho will continue to manage pain control, post op anticoagulation and incision care.     Follow up with your PCP for management of chronic medical problems and to evaluate for post op medical complications including constipation, nausea/vomiting, DVT/PE, anemia, changes in blood pressure, fevers/chills, urinary retention and atelectasis/pneumonia.     Subjective   Patient is doing well on POD #1. Pain is well controlled with oral medications. Ambulating. Tolerating oral intake.     Physical Exam   BP (!) 145/91   Pulse 90   Temp 98.6  F (37  C)  "(Oral)   Resp 15   Ht 1.613 m (5' 3.5\")   Wt 124.3 kg (274 lb)   SpO2 96%   BMI 47.78 kg/m    The patient is A&Ox3. Appears comfortable.   Sensation is intact to light touch in bilateral upper extremities in C5, C6, C7 & C8 distributions  Motor: +5/5 elbow flexion, elbow extension, wrist flexion, wrist extension bilaterally. Equal  strength bilaterally. Full composite fist bilaterally. Opposes thumb.  Palpable Left radial pulse  Calves are soft and non-tender bilaterally. Negative Eladio's.  Anterior neck incision is c/d/i with steri's in place, no hematoma    Pertinent Results at Discharge     Hemoglobin   Date/Time Value Ref Range Status   04/11/2023 06:31 AM 13.1 11.7 - 15.7 g/dL Final     Platelet Count   Date/Time Value Ref Range Status   04/11/2023 06:31  150 - 450 10e3/uL Final       Problem List   Principal Problem:    Lumbar spinal stenosis      Nayana Mckeon PA-C/Dr. Llamas  [facility]  [phone]  Date: 4/12/2023  Time: 12:56 PM   "

## 2023-04-12 NOTE — PLAN OF CARE
[id number]:    VSS. Still has pain with swallowing, but able to swallow. Doesn't have pain when not swallowing. Refusing bed alarm, but steady with moving. Dried drainage on dressing, no change. CMS intact. To be evaluated by speech therapy today, and then plan is to discharge.      Problem: Plan of Care - These are the overarching goals to be used throughout the patient stay.    Goal: Optimal Comfort and Wellbeing  Outcome: Progressing  Intervention: Monitor Pain and Promote Comfort  Recent Flowsheet Documentation  Taken 4/11/2023 1954 by Kiki Farley, RN  Pain Management Interventions:    medication (see MAR)    cold applied     Problem: Spinal Surgery  Goal: Optimal Coping with Surgery  Outcome: Progressing

## 2023-04-12 NOTE — PROGRESS NOTES
"Pt ambulates independently.     Pt's pain controlled by oral pain medications.     Patient understood discharge instructions. Verbal consent by pt's mother, Denisse.     Passed swallow evaluation today early in the am. Pt tolerated soft foods for lunch, stated, \"I feel better having soft foods.\"   "

## 2023-04-12 NOTE — PROGRESS NOTES
[facility] MEDICINE PROGRESS NOTE      Identification/Summary: Eleanor Elizabeht is a 47 year old female with a past medical history of DM2, underwent C5-6, C6/7 discectomy and nerve decompression.  POD 1.  Neck pain is stable.  Feeling sharp pain while swallowing.    Assessment and Plan:  DM2  Metformin 1000 daily on discharge, dose increased  Hemoglobin A1c 7  Diabetic diet and insulin sliding scale    Essential hypertension, newly diagnosed  Was not on antihypertensive at home  Lisinopril 10 mg daily    Morbid obesity, BMI 47.78 kg/m   Modification of lifestyle for weight loss  Outpatient sleep study to rule out obstructive sleep apnea    Dysphagia  Swallow evaluation, stable  Recommended self select softer/liquid based food for comfort given odynophagia    Status post C5-6, C6-7 discectomy and nerve decompression  Resume routine postoperative care  Physical and Occupational Therapy  Weightbearing status: No lifting greater than a full gallon of milk x6 weeks. WBAT bilateral lower extremities.  Use incentive spirometry frequently  DVT prophylaxis per orthopedics with early ambulation and SCDs  Pain control with Tylenol 975 mg every 8 hours, 650 mg every 4 hours as needed, oxycodone 5 to 10 mg every 4 hours as needed  Hemoglobin is stable at 13      Ce Vail MD  [facility] Medicine  [facility]  Phone: #[phone]  Securely message with the Vocera Web Console (learn more here)  Text page via [employer] Paging/Directory     Interval History/Subjective:  Resting comfortably.  Neck pain is stable.  No new radiculopathy.  Dysphagia improved.  Tolerating soft diet.  No other concern.  Will be discharging home today.    Physical Exam/Objective:  Temp:  [98  F (36.7  C)-99.1  F (37.3  C)] 98.6  F (37  C)  Pulse:  [67-90] 90  Resp:  [15-16] 15  BP: (140-157)/(68-90) 157/90  SpO2:  [94 %-96 %] 96 %  Body mass index is 47.78 kg/m .    GENERAL:  Alert,  appears comfortable, in no acute distress, appears stated age, obese   HEAD:  Normocephalic, without obvious abnormality, atraumatic   EYES:  PERRL, conjunctiva  clear,  EOM's intact   NOSE: Nares normal, mucosa normal, no drainage   THROAT: Lips, mucosa,  gums normal, mouth moist   NECK: S/P anterior neck surgery, dressing in place   BACK:   Symmetric, no curvature, ROM normal   LUNGS:   Clear to auscultation bilaterally, no rales, rhonchi, or wheezing, symmetric chest rise on inhalation, respirations unlabored   CHEST WALL:  No tenderness or deformity   HEART:  Regular rate and rhythm, S1 and S2 normal, no murmur    ABDOMEN:   Soft, non-tender, bowel sounds active , no masses, no organomegaly, no rebound or guarding   EXTREMITIES: No   edema    SKIN: No rashes   NEURO: Alert, oriented x3, moves all four extremities freely   PSYCH: Cooperative, behavior is appropriate      Data reviewed today: I personally reviewed all new medications, labs, imaging/diagnostics reports over the past 24 hours. Pertinent findings include:    Labs:  Most Recent 3 CBC's:  Recent Labs   Lab Test 04/11/23  0631   WBC 14.6*   HGB 13.1   MCV 92        Most Recent 3 BMP's:  Recent Labs   Lab Test 04/12/23  0817 04/11/23  2110 04/11/23  1713 04/11/23  0901 04/11/23  0631   NA  --   --   --   --  139   POTASSIUM  --   --   --   --  3.8   CHLORIDE  --   --   --   --  107   CO2  --   --   --   --  24   BUN  --   --   --   --  10   CR  --   --   --   --  0.65   ANIONGAP  --   --   --   --  8   VICKY  --   --   --   --  8.2*   * 152* 202*   < > 141*    < > = values in this interval not displayed.       Medications:   Personally Reviewed.  Medications       acetaminophen  650 mg Oral Q6H     gabapentin  300 mg Oral Q8H ELVIS     insulin aspart  1-7 Units Subcutaneous TID AC     multivitamin, therapeutic  1 tablet Oral Daily     norethindrone  0.35 mg Oral At Bedtime     polyethylene glycol  17 g Oral Daily     senna-docusate  1 tablet  Oral BID     Total time spent 40 min on the date of service doing chart review, history, exam, documentation & further activities per the note.

## 2023-04-12 NOTE — PROGRESS NOTES
[facility] ACUTE PAIN SERVICE    Daily PAIN Progress Note    Assessment/Plan:  Eleanor Elizabeth is a 47 year old female who was admitted on 4/10/2023.  Pain team was asked to see the patient for Post op pain cervical. Admitted for C5-6 anterior disc replacement and C6-7 anterior discectomy and fusion. History of diabetes type 2, obesity. The patient does not smoke and denies chemical dependency history.      Post op day: 2 Day Post-Op.      Opioid Induced Respiratory Depression Risk Assessment:?  Moderate due to the following risk factors: Obesity, opioid naive status, post surgical ?      PLAN:   1) Pain is consistent with acute post op pain s/p C5-6 anterior disc replacement and C6-7 anterior discectomy and fusion. I have personally reviewed pertinent notes, labs, tests, and radiologic imaging in patient's chart. Treatment plan includes multimodal pain approach, Hospital Medicine Service for medical management, Ortho for post-op management. Patient educated regarding multimodal pain approach, medications as listed below, watch for constipation and stool softeners. Patient is understanding of the plan. All questions and concerns addressed to patient's satisfaction. Medications switched to solution form as allows for comfort.   2)Multimodal Medication Therapy  Topical: None  NSAID'S: None   Muscle Relaxants: None  Adjuvants: Tylenol 650mg q6h and q4h PRN, Gabapentin 300mg q8h, Cepachol lozenge PRN, Chloraseptic spray added for throat pain  Antidepressants/anxiolytics: Hydroxyzine 25mg q6h PRN  Opioids: Oxycodone 5-10mg q4h PRN  IV Pain medication: Hydromorphone 0.2-0.4mg q2h PRN  3)Non-medication interventions: ice, SLP consult  Acupuncture consult - offered and agreeable, consult placed  Integrative consult - offered and agreeable, consult placed  4)Constipation Prophylaxis: Scheduled and prn: Senna, Miralax, PRN suppository  5) Care Teams: Ortho, Hospital Medicine, SLP     -Opioid prescriber has been  "None  -MN  pulled from system on 4/11/23. This indicates   3/21/23 Tramadol 50 mg #20   3/5/253 Gabapentin 300 mg #90 - same on 2/6/23  Discharge Recommendations - We recommend prescribing the following at the time of discharge:  Scripts per ortho - would recommend to send with tablets rather than solutions at discharge for meds.     Pain controlled, pain team will sign off. Discussed with ortho.     Subjective:  Patient reports no pain at rest. Pain is only with swallowing. Reports feels like swallowing razor blades. She does feel pain is improved since yesterday.     Principal Problem:    Lumbar spinal stenosis      Objective:  Vital signs in last 24 hours:  BP (!) 157/90 (BP Location: Right arm, Cuff Size: Adult Regular)   Pulse 90   Temp 98.6  F (37  C) (Oral)   Resp 15   Ht 1.613 m (5' 3.5\")   Wt 124.3 kg (274 lb)   SpO2 96%   BMI 47.78 kg/m    Weight:   Vitals:    04/05/23 0935 04/10/23 0821   Weight: 123.8 kg (273 lb) 124.3 kg (274 lb)      Weight change:   Body mass index is 47.78 kg/m .    Intake/Output last 3 shifts:  I/O last 3 completed shifts:  In: 600 [P.O.:600]  Out: 1800 [Urine:1800]  Intake/Output this shift:  I/O this shift:  In: 120 [P.O.:120]  Out: -     Review of Systems:   As per subjective, all others negative.    Physical Exam:  General Appearance:  Alert, cooperative, no distress   Head:  Normocephalic, without obvious abnormality, atraumatic   Eyes:  PERRL, conjunctiva/corneas clear, EOM's intact   Nose: Nares normal, septum midline   Throat: Lips, mucosa, and tongue normal; teeth and gums normal   Neck: Incision covered    Back:   Symmetric, no curvature, ROM normal   Lungs:   Clear to auscultation bilaterally, respirations unlabored   Heart:  Regular rate and rhythm, S1, S2 normal    Abdomen:   Soft, non-tender, bowel sounds active all four quadrants,  no masses, no organomegaly    Extremities: Extremities normal, atraumatic   Skin: Skin color, texture, turgor normal "   Neurologic: Alert and oriented X 3, Moves all 4 extremities     Imaging: Reviewed I have personally reviewed pertinent labs, tests, and radiologic imaging in patient's chart.      Labs: Reviewed I have personally reviewed pertinent labs, tests, and radiologic imaging in patient's chart.  Recent Results (from the past 24 hour(s))   Glucose by meter    Collection Time: 04/11/23 12:36 PM   Result Value Ref Range    GLUCOSE BY METER POCT 271 (H) 70 - 99 mg/dL   Glucose by meter    Collection Time: 04/11/23  5:13 PM   Result Value Ref Range    GLUCOSE BY METER POCT 202 (H) 70 - 99 mg/dL   Glucose by meter    Collection Time: 04/11/23  9:10 PM   Result Value Ref Range    GLUCOSE BY METER POCT 152 (H) 70 - 99 mg/dL   Glucose by meter    Collection Time: 04/12/23  8:17 AM   Result Value Ref Range    GLUCOSE BY METER POCT 134 (H) 70 - 99 mg/dL         Total time spent 35 minutes with greater than 50% in consultation, education and coordination of care.     Also discussed with RN, Orthopedics, Senior Acupuncturist, pharmacist.     Please see A&P for additional details of medical decision making.    Elements of Medical Decision Making as described above. High risk therapy including opioids, high risk drug therapy including oral and/or parenteral controlled substances.       Margie WATSON, FNP-C  Acute Care Pain Management Program  [facility] ([place], [place], [place])  Monday-Friday 8a-4p   Page via online paging system or Vocera Messaging

## 2023-04-12 NOTE — PROGRESS NOTES
Speech-Language Pathology - Clinical Swallow Evaluation     04/12/23 0539   Appointment Info   Signing Clinician's Name / Credentials (SLP) Jazz Espinoza MS CCC-SLP   General Information   Onset of Illness/Injury or Date of Surgery 04/10/23   Referring Physician Nayana Mckeon PA-C   Patient/Family Therapy Goal Statement (SLP) To have less pain   Pertinent History of Current Problem The pt is a 47 year old old female with a history of DM2, underwent C5-6, C6-7 discectomy and nerve decompression with fusion. Clinical swallow evaluation ordered per MD d/t pain with swallowing.   General Observations The pt is alert and pleasant, however in severe pain.   Pain Assessment   Patient Currently in Pain Yes, see Vital Sign flowsheet   Type of Evaluation   Type of Evaluation Swallow Evaluation   Oral Motor   Oral Musculature generally intact   Structural Abnormalities none present   Mucosal Quality good   Dentition (Oral Motor)   Dentition (Oral Motor) natural dentition   Facial Symmetry (Oral Motor)   Facial Symmetry (Oral Motor) WNL   Lip Function (Oral Motor)   Lip Range of Motion (Oral Motor) WNL   Tongue Function (Oral Motor)   Tongue ROM (Oral Motor) WNL   Jaw Function (Oral Motor)   Jaw Function (Oral Motor) range of motion impairment   Comment, Jaw Function (Oral Motor) s/t cervical collar   Jaw Range of Motion Impairment bilateral;moderate impairment   Cough/Swallow/Gag Reflex (Oral Motor)   Volitional Throat Clear/Cough (Oral Motor) WNL   Volitional Swallow (Oral Motor) effortful   Vocal Quality/Secretion Management (Oral Motor)   Vocal Quality (Oral Motor) WFL   Secretion Management (Oral Motor) other (see comments)   Comment, Vocal Quality/Secretion Management (Oral Motor) Pt reports that voice was hoarse yesterday, but at baseline today. Intermittent throat clearing with secretions.   General Swallowing Observations   Past History of Dysphagia No hx of dysphagia per pt.   Respiratory Support (General  Swallowing Observations) none   Current Diet/Method of Nutritional Intake (General Swallowing Observations, NIS) thin liquids (level 0);regular diet   Swallowing Evaluation Clinical swallow evaluation   Clinical Swallow Evaluation   Feeding Assistance no assistance needed   Clinical Swallow Evaluation Textures Trialed thin liquids;pureed   Clinical Swallow Eval: Thin Liquid Texture Trial   Mode of Presentation, Thin Liquids cup;straw;self-fed   Volume of Liquid or Food Presented 8 oz   Oral Phase of Swallow WFL   Pharyngeal Phase of Swallow throat clearing   Diagnostic Statement Throat clear x2 which pt attributed to phlegm. Severe pain with swallowing, however she denied difficulty with swallowing.   Clinical Swallow Evaluation: Puree Solid Texture Trial   Mode of Presentation, Puree spoon;self-fed   Volume of Puree Presented 3 oz   Oral Phase, Puree WFL   Pharyngeal Phase, Puree intact   Diagnostic Statement Throat clear x1, however no additional s/s of aspiration.   Esophageal Phase of Swallow   Patient reports or presents with symptoms of esophageal dysphagia No   Swallowing Recommendations   Diet Consistency Recommendations pureed (level 4);thin liquids (level 0);full liquid diet   Supervision Level for Intake patient independent   Mode of Delivery Recommendations bolus size, small;slow rate of intake   Monitoring/Assistance Required (Eating/Swallowing) monitor for cough or change in vocal quality with intake   Recommended Feeding/Eating Techniques (Swallow Eval) maintain upright sitting position for eating   Medication Administration Recommendations, Swallowing (SLP) per pt preference   Instrumental Assessment Recommendations instrumental evaluation not recommended at this time   General Therapy Interventions   Planned Therapy Interventions Dysphagia Treatment   Dysphagia treatment Modified diet education;Instruction of safe swallow strategies;Compensatory strategies for swallowing   Clinical Impression  "  Criteria for Skilled Therapeutic Interventions Met (SLP Eval) Yes, treatment indicated   SLP Diagnosis Odynophagia s/t acute ACDF, oropharyngeal swallow function appears intact   Risks & Benefits of therapy have been explained evaluation/treatment results reviewed;care plan/treatment goals reviewed;current/potential barriers reviewed;risks/benefits reviewed;participants voiced agreement with care plan;participants included;patient   Clinical Impression Comments Clinical swallow evaluation completed this date. The pt presents with odynophagia in setting of C5-7 ACDF. Oral mech significant for intermittent throat clearing, reduced jaw ROM s/t cervical collar, and minimally hoarse vocal quality s/p intubation. The pt endorses intense pain with swallowing, as if she is swallowing \"razor blades\". She consumed thin liquids and puree solids with no overall s/s of aspiration, apart from occasional throat clearing which is also present in absence of PO. She denied globus sensation or difficulty with swallowing. The pt had wincing with swallow initiation d/t pain and often had to bear down on chair to brace herself. She also took 2 pills that RN passed whole with thin liquids with no s/s of aspiration or difficulty. Advanced solids not assessed as the pt declined chewing d/t pain. Recommend pt self-select softer/liquid-based foods for comfort given odynophagia. The pt is likely to d/c today, however could change diet to full liquid or puree solid if prefered at d/c. Do not suspect ongoing need for SLP therapy as swallow function appears intact, but rather the pain is impacting PO intake. SLP will follow for short course during hospitalization for strategy management.   SLP Total Evaluation Time   Eval: oral/pharyngeal swallow function, clinical swallow Minutes (57928) 18   Interventions   Interventions Quick Adds Swallowing Dysfunction   Swallowing Dysfunction &/or Oral Function for Feeding   Treatment of Swallowing " Dysfunction &/or Oral Function for Feeding Minutes (86042) 10   Symptoms Noted During/After Treatment None   Treatment Detail/Skilled Intervention Swallow tx completed following eval targeting compensatory swallow strategies. Discussed anatomy/physiology of swallow mechanism, impact of procedure on swallow function, and strategies to increase PO intake given pain (i.e., try shakes/smoothies, consider extra sauce/gravy when trialing chewing). Pt verbalized understanding. At this time, it appears that swallow function is intact, but the pain is largest barrier.   SLP Discharge Planning   SLP Plan strategy management, diet advancement as indicated   SLP Discharge Recommendation home   SLP Rationale for DC Rec Swallow function appears intact, difficulty is mostly r/t pain with swallowing   SLP Brief overview of current status  Recommend pt self-select softer/liquid-based foods for comfort given odynophagia. The pt is likely to d/c today, however could change diet to full liquid or puree solid if prefered at d/c. Do not suspect ongoing need for SLP therapy as swallow function appears intact, but rather the pain is impacting PO intake. SLP will follow for short course during hospitalization for strategy management.

## 2023-04-15 ENCOUNTER — HOSPITAL ENCOUNTER (EMERGENCY)
Facility: CLINIC | Age: 48
Discharge: HOME | End: 2023-04-15
Attending: STUDENT IN AN ORGANIZED HEALTH CARE EDUCATION/TRAINING PROGRAM | Admitting: STUDENT IN AN ORGANIZED HEALTH CARE EDUCATION/TRAINING PROGRAM
Payer: COMMERCIAL

## 2023-04-15 VITALS
HEART RATE: 77 BPM | RESPIRATION RATE: 16 BRPM | SYSTOLIC BLOOD PRESSURE: 138 MMHG | DIASTOLIC BLOOD PRESSURE: 97 MMHG | WEIGHT: 274 LBS | HEIGHT: 63 IN | BODY MASS INDEX: 48.55 KG/M2 | TEMPERATURE: 98.6 F | OXYGEN SATURATION: 92 %

## 2023-04-15 DIAGNOSIS — G89.18 POSTOPERATIVE PAIN: ICD-10-CM

## 2023-04-15 DIAGNOSIS — M25.511 ACUTE PAIN OF RIGHT SHOULDER: ICD-10-CM

## 2023-04-15 DIAGNOSIS — M54.2 NECK PAIN: ICD-10-CM

## 2023-04-15 PROCEDURE — 96376 TX/PRO/DX INJ SAME DRUG ADON: CPT

## 2023-04-15 PROCEDURE — 250N000013 HC RX MED GY IP 250 OP 250 PS 637: Performed by: STUDENT IN AN ORGANIZED HEALTH CARE EDUCATION/TRAINING PROGRAM

## 2023-04-15 PROCEDURE — 99285 EMERGENCY DEPT VISIT HI MDM: CPT | Mod: 25

## 2023-04-15 PROCEDURE — 96374 THER/PROPH/DIAG INJ IV PUSH: CPT

## 2023-04-15 PROCEDURE — 250N000011 HC RX IP 250 OP 636: Performed by: STUDENT IN AN ORGANIZED HEALTH CARE EDUCATION/TRAINING PROGRAM

## 2023-04-15 RX ORDER — CYCLOBENZAPRINE HCL 10 MG
10 TABLET ORAL ONCE
Status: COMPLETED | OUTPATIENT
Start: 2023-04-15 | End: 2023-04-15

## 2023-04-15 RX ORDER — TIZANIDINE 2 MG/1
2-4 TABLET ORAL 3 TIMES DAILY PRN
Qty: 20 TABLET | Refills: 0 | Status: SHIPPED | OUTPATIENT
Start: 2023-04-15

## 2023-04-15 RX ORDER — HYDROMORPHONE HYDROCHLORIDE 1 MG/ML
0.5 INJECTION, SOLUTION INTRAMUSCULAR; INTRAVENOUS; SUBCUTANEOUS ONCE
Status: COMPLETED | OUTPATIENT
Start: 2023-04-15 | End: 2023-04-15

## 2023-04-15 RX ADMIN — HYDROMORPHONE HYDROCHLORIDE 0.5 MG: 1 INJECTION, SOLUTION INTRAMUSCULAR; INTRAVENOUS; SUBCUTANEOUS at 11:47

## 2023-04-15 RX ADMIN — HYDROMORPHONE HYDROCHLORIDE 1 MG: 1 INJECTION, SOLUTION INTRAMUSCULAR; INTRAVENOUS; SUBCUTANEOUS at 09:29

## 2023-04-15 RX ADMIN — CYCLOBENZAPRINE 10 MG: 10 TABLET, FILM COATED ORAL at 10:26

## 2023-04-15 ASSESSMENT — ACTIVITIES OF DAILY LIVING (ADL): ADLS_ACUITY_SCORE: 35

## 2023-04-15 NOTE — ED TRIAGE NOTES
C-5 disc replacement and C-6 thu 7 fusion done on Wednesday.  C/O continued pain in neck radiating to  Left  Shoulder and arm.  Uncontrollable pain at home.     Triage Assessment     Row Name 04/15/23 0908       Respiratory WDL    Respiratory WDL WDL       Skin Circulation/Temperature WDL    Skin Circulation/Temperature WDL WDL       Cardiac WDL    Cardiac WDL WDL       Peripheral/Neurovascular WDL    Peripheral Neurovascular WDL WDL       Cognitive/Neuro/Behavioral WDL    Cognitive/Neuro/Behavioral WDL WDL

## 2023-04-15 NOTE — ED PROVIDER NOTES
NAME: Eleanor Elizabeth  AGE: 47 year old female  YOB: 1975  MRN: [id number]  EVALUATION DATE & TIME: 4/15/2023  9:04 AM    PCP: Brandy Pearson  ED PROVIDER: Abril Boone MD.    Chief Complaint   Patient presents with     Neck Pain       FINAL IMPRESSION:  1. Postoperative pain    2. Neck pain    3. Acute pain of right shoulder        MEDICAL DECISION MAKIN:09 AM I met with the patient, obtained history, performed an initial exam, and discussed options and plan for diagnostics and treatment here in the ED.   10:05 AM rechecked patient  10:22 AM Discussed with Dr. Figueroa from [facility]  11:27 AM Reassessed patient    48 y/o F who presents with neck and shoulder pain. Patient is POD #5 from C5-6 anterior disc replacement and C6-7 anterior discectomy and fusion with Dr. Llamas. Now with severe neck pain that spreads to her right shoulder and arm that started at 3 am. No new concerning neurologic changes. No fevers or other concerning infectious symptoms. No chest pain or shortness of breath, hypoxia/tachycardia--do not suspect PE, dissection, ACS. No abdominal pain, doubt referred intraabdominal pathology. Other concern would be for post-operative complication. Plan for pain medication, reassessment and discussion with spine surgery.    Given dilaudid and flexeril here with overall improvement to pain to a level that she feels comfortable with discharge home. Discussed with [facility] who agrees that given reassuring exam today do not feel requires imaging today and should likely be taking oxycodone more frequently, also recommended adding on tizanidine. Recommended continual close outpatient follow up with     Medical Decision Making    History:    Supplemental history from: Documented in chart, if applicable    External Record(s) reviewed: Documented in chart, if applicable.    Work Up:    Chart documentation includes differential considered and any EKGs or imaging interpreted by  provider.    In additional to work up documented, I considered the following work up: Documented in chart, if applicable.    External consultation:    Discussion of management with another provider: Documented in chart, if applicable    Complicating factors:    Care impacted by chronic illness: N/A    Care affected by social determinants of health: N/A    Disposition considerations: Discharge. I prescribed additional prescription strength medication(s) as charted. See documentation for any additional details.      MEDICATIONS GIVEN IN THE EMERGENCY:  Medications   HYDROmorphone (DILAUDID) injection 1 mg (1 mg Intravenous $Given 4/15/23 0990)   cyclobenzaprine (FLEXERIL) tablet 10 mg (10 mg Oral $Given 4/15/23 1026)   HYDROmorphone (PF) (DILAUDID) injection 0.5 mg (0.5 mg Intravenous $Given 4/15/23 1147)       NEW PRESCRIPTIONS STARTED AT TODAY'S ER VISIT:  New Prescriptions    TIZANIDINE (ZANAFLEX) 2 MG TABLET    Take 1-2 tablets (2-4 mg) by mouth 3 times daily as needed for muscle spasms        =================================================================  HPI    Patient information was obtained from: patient  Use of : N/A      Eleanor Elizabeth is a 47 year old female with a past medical history of diabetes, cervical radiculopathy, spinal stenosis who presents with post op pain. Patient underwent a cervical 5-6 anterior disc replacement and cervical 6-7 anterior descetomy and fusion on 4/10/23 with Dr. Llamas.  Patient.  This morning she woke up with sudden pain to her neck that travels to her right shoulder and right arm.  She has been taking scheduled Tylenol since her surgery.  This morning after the pain started she took 5 mg of oxycodone without improvement.  She reports no new numbness or weakness to her extremities.  No fevers, chest pain, abdominal pain, difficulty breathing.     REVIEW OF SYSTEMS   All systems reviewed, please see HPI for pertinent findings    PAST MEDICAL HISTORY:  Past  Medical History:   Diagnosis Date     Diabetes (H)      Obese      PONV (postoperative nausea and vomiting)        PAST SURGICAL HISTORY:  Past Surgical History:   Procedure Laterality Date     DISCECTOMY, SPINE, CERVICAL, 2 LEVELS, ANTERIOR APPROACH, WITH INTERVERTEBRAL DISC REPLACEMENT Bilateral 4/10/2023    Procedure: CERVICAL 5-6 ANTERIOR DISC REPLACEMENT CERVICAL 6-7 ANTERIOR DISCECTOMY AND FUSION;  Surgeon: Barber Llamas MD;  Location: [facility] Main OR       CURRENT MEDICATIONS:    No current facility-administered medications for this encounter.    Current Outpatient Medications:      tiZANidine (ZANAFLEX) 2 MG tablet, Take 1-2 tablets (2-4 mg) by mouth 3 times daily as needed for muscle spasms, Disp: 20 tablet, Rfl: 0     acetaminophen (TYLENOL) 325 MG tablet, Take 2 tablets (650 mg) by mouth every 4 hours as needed for other (For optimal non-opioid multimodal pain management to improve pain control.), Disp: 100 tablet, Rfl: 0     [START ON 4/17/2023] aspirin (ASA) 81 MG EC tablet, Take 1 tablet (81 mg) by mouth daily, Disp: 30 tablet, Rfl: 0     cholecalciferol (VITAMIN D3) 10 mcg (400 units) TABS tablet, Take 10 mcg by mouth daily, Disp: , Rfl:      gabapentin (NEURONTIN) 300 MG capsule, Take 300 mg by mouth 3 times daily, Disp: , Rfl:      hydrOXYzine (ATARAX) 25 MG tablet, Take 1 tablet (25 mg) by mouth every 6 hours as needed for other (adjuvant pain), Disp: 30 tablet, Rfl: 0     lisinopril (ZESTRIL) 10 MG tablet, Take 1 tablet (10 mg) by mouth daily, Disp: 30 tablet, Rfl: 1     metFORMIN (GLUCOPHAGE XR) 500 MG 24 hr tablet, Take 2 tablets (1,000 mg) by mouth daily (with dinner), Disp: 60 tablet, Rfl: 0     Multiple Vitamins-Iron (MULTIPLE VITAMIN/IRON OR), Take 1 tablet by mouth daily, Disp: , Rfl:      norethindrone (MICRONOR) 0.35 MG tablet, Take 0.35 mg by mouth At Bedtime, Disp: , Rfl:      oxyCODONE (ROXICODONE) 5 MG tablet, Take 1 tablet (5 mg) by mouth every 6 hours as needed for pain,  "Disp: 30 tablet, Rfl: 0    ALLERGIES:  No Known Allergies    FAMILY HISTORY:  No family history on file.    SOCIAL HISTORY:   Social History     Socioeconomic History     Marital status: Single   Tobacco Use     Smoking status: Never     Smokeless tobacco: Never   Substance and Sexual Activity     Alcohol use: Never     Drug use: Never       PHYSICAL EXAM:    Vitals: BP (!) 138/97   Pulse 77   Temp 98.6  F (37  C) (Oral)   Resp 16   Ht 1.6 m (5' 3\")   Wt 124.3 kg (274 lb)   SpO2 92%   BMI 48.54 kg/m     Constitutional: Well developed, well nourished. Moaning and tearful  HENT: Normocephalic. Incision to the anterior neck without erythema/drainage. Difficult for her to range her neck due to pain. Diffuse tenderness t the posterior neck mostly to the right paraspinal muscles  Eyes: Pupils mid-range and equal, sclera white  Respiratory: CTAB, no respiratory distress, speaks full sentences easily.  Cardiovascular: Normal heart rate, regular rhythm. 2+ radial pulses.  Abdomen: soft, not distended, not tender  Musculoskeletal: Pain to the right upper trapezius and shoulder without erythema/warmth/deformity  Skin: Warm, dry  Neurologic: Alert & oriented, speech clear, CNs grossly intact, strength 5/5 and sensation to light touch intact in the extremities    LAB:  All pertinent labs reviewed and interpreted.  Labs Ordered and Resulted from Time of ED Arrival to Time of ED Departure - No data to display    RADIOLOGY:  No orders to display       EKG:   N/A    PROCEDURES:   Procedures       Abril Boone M.D.  Emergency Medicine  [facility] EMERGENCY ROOM  [address] [place] [address] [id number] [phone]  Dept: [phone]       Abril Boone MD  04/15/23 1249    "

## 2023-04-15 NOTE — DISCHARGE INSTRUCTIONS
I discussed with [facility] surgeon on call  Recommend continue with scheduled tylenol and atarax. You will likely need to start taking oxycodone more frequently to stay on top of the pain.    They also recommended can try tizanidine as needed for muscle spasm/tightness. Can take this up to 3x a day as needed    Return to the Emergency Room with new numbness/weakness, fevers, redness/drainage from incision, chest pain, difficulty breathing or other worsening symptoms

## 2023-04-27 ENCOUNTER — TRANSFERRED RECORDS (OUTPATIENT)
Dept: HEALTH INFORMATION MANAGEMENT | Facility: CLINIC | Age: 48
End: 2023-04-27
Payer: COMMERCIAL

## 2023-08-12 ENCOUNTER — HEALTH MAINTENANCE LETTER (OUTPATIENT)
Age: 48
End: 2023-08-12

## 2023-08-31 ASSESSMENT — SLEEP AND FATIGUE QUESTIONNAIRES
HOW LIKELY ARE YOU TO NOD OFF OR FALL ASLEEP WHILE SITTING AND TALKING TO SOMEONE: WOULD NEVER DOZE
HOW LIKELY ARE YOU TO NOD OFF OR FALL ASLEEP WHILE SITTING QUIETLY AFTER LUNCH WITHOUT ALCOHOL: WOULD NEVER DOZE
HOW LIKELY ARE YOU TO NOD OFF OR FALL ASLEEP WHILE SITTING INACTIVE IN A PUBLIC PLACE: WOULD NEVER DOZE
HOW LIKELY ARE YOU TO NOD OFF OR FALL ASLEEP WHEN YOU ARE A PASSENGER IN A CAR FOR AN HOUR WITHOUT A BREAK: WOULD NEVER DOZE
HOW LIKELY ARE YOU TO NOD OFF OR FALL ASLEEP IN A CAR, WHILE STOPPED FOR A FEW MINUTES IN TRAFFIC: WOULD NEVER DOZE
HOW LIKELY ARE YOU TO NOD OFF OR FALL ASLEEP WHILE SITTING AND READING: WOULD NEVER DOZE
HOW LIKELY ARE YOU TO NOD OFF OR FALL ASLEEP WHILE WATCHING TV: SLIGHT CHANCE OF DOZING
HOW LIKELY ARE YOU TO NOD OFF OR FALL ASLEEP WHILE LYING DOWN TO REST IN THE AFTERNOON WHEN CIRCUMSTANCES PERMIT: SLIGHT CHANCE OF DOZING
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
## 2023-09-01 ENCOUNTER — OFFICE VISIT (OUTPATIENT)
Dept: SURGERY | Facility: CLINIC | Age: 48
End: 2023-09-01
Payer: COMMERCIAL

## 2023-09-01 ENCOUNTER — VIRTUAL VISIT (OUTPATIENT)
Dept: SURGERY | Facility: CLINIC | Age: 48
End: 2023-09-01
Payer: COMMERCIAL

## 2023-09-01 ENCOUNTER — LAB (OUTPATIENT)
Dept: LAB | Facility: CLINIC | Age: 48
End: 2023-09-01
Payer: COMMERCIAL

## 2023-09-01 VITALS
DIASTOLIC BLOOD PRESSURE: 80 MMHG | WEIGHT: 267.5 LBS | HEIGHT: 64 IN | BODY MASS INDEX: 45.67 KG/M2 | SYSTOLIC BLOOD PRESSURE: 142 MMHG

## 2023-09-01 DIAGNOSIS — E11.9 TYPE 2 DIABETES, HBA1C GOAL < 7% (H): ICD-10-CM

## 2023-09-01 DIAGNOSIS — Z87.19 HISTORY OF FATTY INFILTRATION OF LIVER: ICD-10-CM

## 2023-09-01 DIAGNOSIS — Z13.21 ENCOUNTER FOR VITAMIN DEFICIENCY SCREENING: ICD-10-CM

## 2023-09-01 DIAGNOSIS — E66.01 CLASS 3 SEVERE OBESITY DUE TO EXCESS CALORIES WITH SERIOUS COMORBIDITY AND BODY MASS INDEX (BMI) OF 45.0 TO 49.9 IN ADULT (H): ICD-10-CM

## 2023-09-01 DIAGNOSIS — E66.813 CLASS 3 SEVERE OBESITY DUE TO EXCESS CALORIES WITH SERIOUS COMORBIDITY AND BODY MASS INDEX (BMI) OF 45.0 TO 49.9 IN ADULT (H): Primary | ICD-10-CM

## 2023-09-01 DIAGNOSIS — E66.813 CLASS 3 SEVERE OBESITY DUE TO EXCESS CALORIES WITH SERIOUS COMORBIDITY AND BODY MASS INDEX (BMI) OF 45.0 TO 49.9 IN ADULT (H): ICD-10-CM

## 2023-09-01 DIAGNOSIS — E66.01 CLASS 3 SEVERE OBESITY DUE TO EXCESS CALORIES WITH SERIOUS COMORBIDITY AND BODY MASS INDEX (BMI) OF 45.0 TO 49.9 IN ADULT (H): Primary | ICD-10-CM

## 2023-09-01 DIAGNOSIS — E66.01 OBESITY, CLASS III, BMI 40-49.9 (MORBID OBESITY) (H): Primary | ICD-10-CM

## 2023-09-01 LAB
ALBUMIN SERPL BCG-MCNC: 4.3 G/DL (ref 3.5–5.2)
ALP SERPL-CCNC: 87 U/L (ref 35–104)
ALT SERPL W P-5'-P-CCNC: 38 U/L (ref 0–50)
ANION GAP SERPL CALCULATED.3IONS-SCNC: 12 MMOL/L (ref 7–15)
AST SERPL W P-5'-P-CCNC: 42 U/L (ref 0–45)
BILIRUB SERPL-MCNC: 0.7 MG/DL
BUN SERPL-MCNC: 8.6 MG/DL (ref 6–20)
CALCIUM SERPL-MCNC: 10.2 MG/DL (ref 8.6–10)
CHLORIDE SERPL-SCNC: 104 MMOL/L (ref 98–107)
CREAT SERPL-MCNC: 0.72 MG/DL (ref 0.51–0.95)
DEPRECATED HCO3 PLAS-SCNC: 25 MMOL/L (ref 22–29)
ERYTHROCYTE [DISTWIDTH] IN BLOOD BY AUTOMATED COUNT: 13 % (ref 10–15)
GFR SERPL CREATININE-BSD FRML MDRD: >90 ML/MIN/1.73M2
GLUCOSE SERPL-MCNC: 100 MG/DL (ref 70–99)
HBA1C MFR BLD: 6.4 % (ref 0–5.6)
HCT VFR BLD AUTO: 42.3 % (ref 35–47)
HGB BLD-MCNC: 15.2 G/DL (ref 11.7–15.7)
MAGNESIUM SERPL-MCNC: 1.8 MG/DL (ref 1.7–2.3)
MCH RBC QN AUTO: 31.7 PG (ref 26.5–33)
MCHC RBC AUTO-ENTMCNC: 35.9 G/DL (ref 31.5–36.5)
MCV RBC AUTO: 88 FL (ref 78–100)
PLATELET # BLD AUTO: 208 10E3/UL (ref 150–450)
POTASSIUM SERPL-SCNC: 4.5 MMOL/L (ref 3.4–5.3)
PROT SERPL-MCNC: 8 G/DL (ref 6.4–8.3)
PTH-INTACT SERPL-MCNC: 14 PG/ML (ref 15–65)
RBC # BLD AUTO: 4.79 10E6/UL (ref 3.8–5.2)
SODIUM SERPL-SCNC: 141 MMOL/L (ref 136–145)
TSH SERPL DL<=0.005 MIU/L-ACNC: 1.15 UIU/ML (ref 0.3–4.2)
VIT B12 SERPL-MCNC: 726 PG/ML (ref 232–1245)
WBC # BLD AUTO: 11.4 10E3/UL (ref 4–11)

## 2023-09-01 PROCEDURE — 82306 VITAMIN D 25 HYDROXY: CPT

## 2023-09-01 PROCEDURE — 83970 ASSAY OF PARATHORMONE: CPT

## 2023-09-01 PROCEDURE — 97802 MEDICAL NUTRITION INDIV IN: CPT | Mod: VID

## 2023-09-01 PROCEDURE — 36415 COLL VENOUS BLD VENIPUNCTURE: CPT

## 2023-09-01 PROCEDURE — 99000 SPECIMEN HANDLING OFFICE-LAB: CPT

## 2023-09-01 PROCEDURE — 84425 ASSAY OF VITAMIN B-1: CPT | Mod: 90

## 2023-09-01 PROCEDURE — 83036 HEMOGLOBIN GLYCOSYLATED A1C: CPT

## 2023-09-01 PROCEDURE — 80053 COMPREHEN METABOLIC PANEL: CPT

## 2023-09-01 PROCEDURE — 83735 ASSAY OF MAGNESIUM: CPT

## 2023-09-01 PROCEDURE — 82607 VITAMIN B-12: CPT

## 2023-09-01 PROCEDURE — 84443 ASSAY THYROID STIM HORMONE: CPT

## 2023-09-01 PROCEDURE — 85027 COMPLETE CBC AUTOMATED: CPT

## 2023-09-01 PROCEDURE — 99204 OFFICE O/P NEW MOD 45 MIN: CPT | Performed by: EMERGENCY MEDICINE

## 2023-09-01 RX ORDER — BLOOD-GLUCOSE METER
EACH MISCELLANEOUS
COMMUNITY
Start: 2023-05-04

## 2023-09-01 RX ORDER — LANCETS
EACH MISCELLANEOUS
COMMUNITY
Start: 2023-07-20

## 2023-09-01 RX ORDER — ROSUVASTATIN CALCIUM 10 MG/1
10 TABLET, COATED ORAL DAILY
COMMUNITY
Start: 2023-04-24 | End: 2024-04-23

## 2023-09-01 NOTE — PROGRESS NOTES
Eleanor Elizabeth is a 48 year old who is being evaluated via a billable video visit.      How would you like to obtain your AVS? MyChart  If the video visit is dropped, the invitation should be resent by: Send to e-mail at: [email or web address]  Will anyone else be joining your video visit? No          Medical Weight Loss Initial Diet Evaluation  Assessment:  This patient was referred by Dr. Fine for MNT as treatment for Morbid obesity which is impacting her type 2 DM, and pain.     Eleanor is presenting today for a new weight management nutrition consultation. Pt has had an initial appointment with Dr. Fine.    Weight loss medication:  Mounjaro . Metformin     Personal Goals: Wants to get her A1c back down and get rid of the DM dx.      Anthropometrics:    Pt's weight is 267 lbs   Initial weight: 267 lbs  Weight change: n/a  BMI: 45.92 kg/m2  Ideal body weight: 54.7 kg (120 lb 9.5 oz)  Adjusted ideal body weight: 81.4 kg (179 lb 5.7 oz)  Estimated RMR ([place]-St Jeor equation):  1830 kcals x 1.2 (sedentary) = 2200 kcals (for weight maintenance)      Recommended Protein Intake: 70-90 grams of protein/day    Medical History:  Patient Active Problem List   Diagnosis    Lumbar spinal stenosis    Diabetes mellitus, type 2 (H)      Diabetes: Yes, type 2 - on Metformin   HbA1c:  6.4% (9/1/2023)    Nutrition History:   Food allergies/intolerances/cultural or religous food customs: No Does not eat seafood.    Weight loss history: Patient was dx with DM in March of this year. Had GDM when she was pregnant with her now 8 year old son. Saw a nutritionist when she was pregnant. Does not have a routine eating schedule. Has hx with skipping meals or relying on convince foods. Has a son who is a picky eater.  +Eats wheat bread.    Vitamins/Mineral Supplementation: Vit D, MVI, 2x Ca, Vit E, B complex, probiotic,     Dietary Recall:  7:30-4pm work schedule occasionally 6:30AM start.     Breakfast: Skips Or scrambled  eggs   Lunch: snacks or ramen noodles  Dinner: fast food, sandwich, mac and cheese   Typical Snacks: beef stick   Overnight eating: No  Eating out: often, More that 3x per week    Beverages: Water, Crystal light, Mt Dew (significant amount in the past)    Exercise: no routine at this time. Averages 10-14k steps per day.     Nutrition Diagnosis (PES statement):   Morbid obesity related to nutrition knowledge defecit as evidence by patient report of dietary intake and BMI of 45.92 kg/m2     Disordered Eating Pattern (NB 1.5) related to obsessive desire, intake of food exceeding RMR as evidenced by frequent grazing, skipping meals     Nutrition Intervention  Food and/or Nutrient Delivery   Placed emphasis on importance of developing a healthy meal routine, aiming for 3 meals a day and no snacks.  Discussed using a protein supplement as a meal replacement.  Nutrition Education   Discussed with patient how to build a meal: the importance of including a lean/low fat protein at each meal, include a source of vegetables at a minimum of lunch and dinner and limiting carbohydrate intake  Educated on sources of lean protein, portion sizes, the amount of grams found in each source. Recommend patient to aim for 20-30g protein at each meal.  Discussed the importance of adequate hydration, with emphasis on drinking 64oz of water or zero calorie beverages per day.  Nutrition Counseling   Encouraged importance of developing routine exercise for health benefits and weight loss.      Goals established by patient:   Aim to eat breakfast every day (25-35g per meal)   Weaned form Mt Dew intake to 1 bottle per week.   Have a protein shake instead of skipping meals         Handouts provided:  Meal pairings   Keys to success  Lean protein options     Assessment/Plan:    Pt will follow up in 4 month(s) with bariatrician and 1.5 month(s) with dietitian.       Video-Visit Details    Type of service:  Video Visit    Video Start Time (time video  started): 4:06 PM    Video End Time (time video stopped): 4:34 PM    Originating Location (pt. Location): Home      Distant Location (provider location):  Off-site    Mode of Communication:  Video Conference via [facility]    Physician has received verbal consent for a Video Visit from the patient? Yes      Pau Mensah RD

## 2023-09-01 NOTE — PATIENT INSTRUCTIONS
"   Plan:  Welcome to your weight loss season. Aiming for 1500-1700kcal/day and 70-90 grams of lean protein daily to stay on track with weight loss while feeling well.     Hydrate well to hit 80 oz of water daily.     Given your diabetes we'll ramp up Mounjaro to complement your metformin with monthly increases until up to 10mg/week around mid November with plans to stay on that dose through the Winter and re-evaluate dosing needs. This will curb appetite to large degree, remember to get protein first at meals, don't drink with your meals too much.  Hydrate well between meals to hit your 80oz/day water intake.    Check labs today.    If tracking steps, we'll try to have 20% of your steps be at a brisk pace.  Great work getting your steps in daily. To tap into exercise benefits, aim for 0495-4979 steps at a brisk pace for at least 10 continuous  minutes.     6. Follow up with dietician for guided therapy for weight management in diabetes.  I suspect that around 60 lb weight reduction your diabetes should go into remission.            What makes a person succeed with dramatic and sustained weight loss?    It's being at the right point in your life where you feel the need to lose the weight, not because anyone told you so but because of a voice inside of you that says, \"I am ready for this\".  You're now at a point where you may be feeling anxious, irritable and when you look in the mirror you do not recognize the person looking back.  Your healthy self is buried somewhere in that reflexion and you want to free it again.  This is the sort of motivation that leads to success, and it comes from you.    Because the only person that can lose that extra weight is you, I like my patients to focus on the mindset of being in Weight Loss Season.  This gives you permission to make the changes necessary to be consistent with the diet/activity and behavior changes that lead to successful, healthy weight loss.  Nearly any diet plan " "can work for weight loss, but keeping it healthy and nutrient based to prevent deficiencies/hair loss/fatigue or irritability is vital.  If you have a plan you want to try, we'll work with you to make sure no adjustments are needed to keep you healthy through your weight loss season and working with our Bariatric Dieticians you'll be given expert guidance to customize your diet plan to suit your particular needs. If you don't have your own ideas in mind, we are always happy to suggest well researched and validated plans that provide enough food to prevent hunger but still tap into your excess fat reserves and lose weight in a sustainable fashion.  There is great evidence that lean protein/healthy fat intake with good fiber intake while minimizing simple starches/carbs produces reliable/sustainable weight loss in most people. But some feel more connected to an intermittent fasting/fast mimicking or ketogenic diet.  These protocols can be hard for many to stick with and that's why we prefer the protein/healthy fat focused diets but if these alternative strategies appeal to you, we can work with you to optimize your knowledge and results with these tools.    Losing weight is a temporary commitment, but you need to be \"All In\" to have a good weight loss season.  To avoid frustration, you have to be willing to be on track 19 out of 20 days or even better than that. But, weight loss season is generally only 4-8 months in length. After that length of time, it can be hard to maintain a negative calorie balance and our brain, motivations and metabolism will usually bring you to a plateau that cannot be broken in this modern world where other commitments start to take priority. That's when we look to stabilize the weight loss you've achieved.  If you've reached your goal by that time, fantastic, and job well done.  If there is more to go, then after a few months of stabilization, we can usually attack that previous plateau " "and break into new territory.    Because of this time limitation, we want to really get to work right away and get into a sustainable routine ASAP.  One of the best predictors of how much weight you're going to lose throughout the season is how much you lose in the first 6 weeks, so prepare well and jump in with both feet.      Occasionally, people may feel like they cannot commit fully to the changes necessary and may want to change one thing at a time and \"get used to\" the idea of losing weight.  That is OK because that is where they are in their life, and they cannot fully commit for any number of reasons.  It's part of that internal motivation and they just haven't reached PRIORTY NUMBER ONE status yet. It's possible that what they need is more time to reach that point and I am always willing to work with people that want to \"dabble\", but understand, the amount of success obtained with half measures, is much less than half results. Behavior Change cannot occur until we prepare our minds, bodies and environment for what is to come, action!    As you go through your plan, look for things to keep your motivation rolling.  The most successful people have a goal or target/reward that they are working towards.  Having a reward that celebrates your new fitness, mobility and energy is the best sort because it will encourage you to do well with the weight maintenance phase and long term lifestyle changes that promotes keeping the weight off for the long term.  Usually, \"getting healthier\" or improving blood tests or losing weight so your clothes fit better is not as internally motivating as having a tangible reward.  A good weight loss season reward is one that isn't food based, is affordable, but something special:  Something you won't be getting/doing unless you achieve your goal.   It s important to keep to the rule of success:  in order to get the reward, your goal MUST be achieved. Write this reward down, where you " can look at it daily and keep it in the front of your mind as you go through your weight loss season and it will help keep you on track.    Tools that help change behavior are vital for success. The most studied and most supported tool for weight loss is nothing more than writing down your food and weight every day.  Every Day.  Accurately and completely.  When you commit to weight loss season, this information tells you whether you're getting ENOUGH food to fuel your weight loss properly as well as teaches you the interaction between different foods you eat and how your body responds with weight loss.  You'll see that sometimes after a heavy workout you don't see the scale move until 2-3 days later.  How saltier meals (chili for example) may make you retain water for 4-5 days before you see the weight come off, you'll get used to the mini-plateaus that develop after a good 3-8 lb drop in weight as well as how you break through if you keep working the diet as you should.    Weight loss is not a linear process, there are mini ups and downs.  Learning how your body loses weight and getting comfortable with that is very rewarding. The act of writing words on paper also solidifies your will power and commitment to the season of weight loss and that by itself changes your brain chemistry/appetite, motivation and prepares you for maximal success.     Behavior change is all about getting into a new routine.  The old habits and routine have to change because without changing the circumstances of how you gained your weight, it's unlikely you'll enjoy satisfying results. If you have snacking habits, like every time you walk through the kitchen you grab a little something, well, that habit has to change and be replaced by a new habit.  It can be something as simple as keeping a doodle pad on the counter that you make a few scribbles and then walk through the kitchen having not opened the cupboard, or starting with a glass of  "water and leaving the kitchen without anything else, or checking your food journal to see how many calories you have left for the day.  Boredom is the enemy as are the old habits. Break new ground and try to push those old habits into a deep hole.  There are apps/counseling options available that can help with some of the day to day urges/behaviors if you're struggling. One commercial product that does a good job is [employer].  Unfortunately, there is a subscription fee.    Finally, exercise always helps.  While not mandatory to lose weight, every little bit helps and exercise has so many other benefits that to not work it into your plan is to miss out on all the mood, sleep, stress and general health benefits that come from making yourself a little short of breath and sweaty at least 3-4 days out of the week.  The metabolism and calorie burn benefits aside, almost every chronic ailment in medicine gets better with proper, aerobic exercise.  Allow yourself to start slow and let your body prepare itself to accept harder training 4-6 weeks down the road, but start now and commit to a plan.  Whether you have the means to hire a , join a gym or just walk out your front door or go down to your basement for a video workout, get into a exercise routine and  after 3 weeks of at least 3 times a week exercise you should be at a point where you can slowly start ramping up 10% each week to our goal of at least 150-300minutes weekly of aerobic exercise and at least twice weekly resistance training/strenghtening with weights/bands or body weight exercises.     I am a big fan of modifying the free training plan, \"Couch to 5k\", for almost all of my patients. Just type it into [employer] or look it up on your smart phone tylor store.  To modify the plan,  you can use the training plan for whatever aerobic activity you do (bike/treadmill/elliptical/rower/pool/etc). During the \"jog\" intervals, you just move a little faster or harder or " "increase the tension or incline.  You use those little intervals to switch up the workout and recruit more muscles and pump the blood a little more and then recover again in the \"walk\" intervals by slowing down, decreasing the incline or turning down the tension.  3-4 days a week is not that much to ask and the benefits are enormous.  Start slow and develop the base from which you can then build on and reduce the risk of injury.  It's much more important 2-4 months from now to be enjoying your exercise then it is to over exert yourself at the start and hurt yourself.  Starting slowly allows your body to accept the training better down the road when the exercise becomes crucial for weight maintenance.  Without exercise down the road during your maintenance phase, all this hard work you are about to put in can be undone. It usually takes about 100-300 calories a day of exercise to maintain a weight loss and our focus during weight loss season is to generate the routine/activities and hobbies that make that enjoyable/sustainable.    Thanks for taking this first and most important step in your weight loss season.  Commit to it and we will cheerlead you all the way to success.  When things get tough or off track we'll offer guidance and analysis and when you reach your goal we'll celebrate your success.  In the end, it is all about your success, your health and what you do with it.      Francisco Fine MD  [facility]  [phone]        LEAN PROTEIN SOURCES  Getting 20-30 grams of protein, 3 meals daily, is appropriate for most people, some need more but more than about 40 grams per meal is not useful.  General rule is drinking one ounce of water per gram of protein eaten over the course of the day:  70 grams of protein each day, drink 70 oz of water.  Protein Source Portion Calories Grams of Protein                           Nonfat, plain Greek yogurt    (10 grams sugar or less) 3/4 cup " (6 oz)  12-17   Light Yogurt (10 grams sugar or less) 3/4 cup (6 oz)  6-8   Protein Shake 1 shake 110-180 15-30   Skim/1% Milk or lactose-free milk 1 cup ( 8 oz)  8   Plain or light, flavored soymilk 1 cup  7-8   Plain or light, hemp milk 1 cup 110 6   Fat Free or 1% Cottage Cheese 1/2 cup 90 15   Part skim ricotta cheese 1/2 cup 100 14   Part skim or reduced fat cheese slices 1 ounce 65-80 8     Mozzarella String Cheese 1 80 8   Canned tuna, chicken, crab or salmon  (canned in water)  1/2 cup 100 15-20   White fish (broiled, grilled, baked) 3 ounces 100 21   [place]/Tuna (broiled, grilled, baked) 3 ounces 150-180 21   Shrimp, Scallops, Lobster, Crab 3 ounces 100 21   Pork loin, Pork Tenderloin 3 ounces 150 21   Boneless, skinless chicken /turkey breast                          (broiled, grilled, baked) 3 ounces 120 21   Flat Top, [place], [place], and Venison 3 ounces 120 21   Lean cuts of red meat and pork (sirloin,   round, tenderloin, flank, ground 93%-96%) 3 ounces 170 21   Lean or Extra Lean Ground Turkey 1/2 cup 150 20   90-95% Lean [place]y Four Burger 1 joy 140-180 21   Low-fat casserole with lean meat 3/4 cup 200 17   Luncheon Meats                                                        (turkey, lean ham, roast beef, chicken) 3 ounces 100 21   Egg (boiled, poached, scrambled) 1 Egg 60 7   Egg Substitute 1/2 cup 70 10   Nuts (limit to 1 serving per day)  3 Tbsp. 150 7   Nut [place] (peanut, almond)  Limit to 1 serving or less daily 1 Tbsp. 90 4   Soy Burger (varies) 1  15   Garbanzo, Black, [place] Beans 1/2 cup 110 7   Refried Beans 1/2 cup 100 7   Kidney and Lima beans 1/2 cup 110 7   Tempeh 3 oz 175 18   Vegan crumbles 1/2 cup 100 14   Tofu 1/2 cup 110 14   Chili (beans and extra lean beef or turkey) 1 cup 200 23   Lentil Stew/Soup 1 cup 150 12   Black Bean Soup 1 cup 175 12         Example Meal Plan for a 5851-5949 Calorie Diet:    In order to fuel your weight loss properly and avoid  hunger-induced overeating later in the day, for your height and weight, you will enjoy the most success by following the diet below or similar with adjustments based on your particular tastes and preferences.  Exercise may influence speed, amount of weight loss further.     I recommend getting into a meal routine and keeping it similar day to day in the beginning so you don t have to think too hard about what you re going to make/eat.  Keep snacks healthy, ideally containing protein and some vegetables.  Non-processed food is preferable to packaged items.  Eat at least a few crunchy green vegetables if having a snack, which should be 2-3 hours after your mealtimes(prepare these ahead of time for ease of use).  Drink 64 oz -80 oz of water daily for most, some of you will need more and we'll discuss it at your visit if that is the case.      When changing our diet,  we can often mistake thirst for hunger or just have some distracted eating habits that we need to break free from ('bored/mindless eating', screen time,work, driving,etc).  A glass of water and reconsideration of our hunger is often all that is needed.  Having the urge is not the problem, but watching it pass by without acting on it is the goal.    If you re having hunger problems, add a protein drink/snack to your morning hours or afternoon snack with at least 20grams of protein and not too much sugar (under 10g).  A carton of higher protein/low sugar yogurt can work as well.  If the urge to snack is overwhelming and not satiated, try going for a 10 minute walk/exercise, come home and drink a glass of water and if still hungry, have a  calorie snack (handful of raw/sprouted nuts, veggies and string cheese, protein bar, etc).  Savor it.    It is better to have a large breakfast, a moderate lunch and a smaller dinner to fuel your day.  People lose 10-15% more weight during their weight loss season with this strategy. Optimizing your protein intake at  each meal will further keep you more satisfied while eating less food overall.  Getting exercise in early has also been shown to offer the best results (before breakfast ideally but anytime is the right time to exercise if that is not an option for you).    To make sure you re getting adequate vitamins and minerals during weight loss, I recommend one complete multivitamin a day of your choice.  Consider a probiotic and taking some vitamin D 2000 IU daily.    Let supper be your last meal of the day and ideally try to have at least 12 hours between supper and breakfast the next day to tap into some beneficial overnight fasting dynamics.  Midnight snacks need to go away. Water in the evening is fine, unsweetened, non caffeinated herbal tea is helpful as well.  Consolidating your meals within a 8-12 hour period of your day will help tap into these additional metabolic benefits and tends to keep your appetite up for breakfast, further helping to stay on track.  For most of my patients, I don't recommend an intermittent fasting style diet (many find it hard to fit in their lifestyle) but an overnight fast is very doable for most patients and helps regulate our hunger drives a little better.  This makes it very important to nail good intake at all three meals to feel satisfied/energized and still lose weight.      If evening snacking desires are high, consider a glass of fiber supplement for some additional fullness (metamucil or similar). Most of us don't get the 25-30 grams per day of fiber that promotes good gut health/satiety.  Benefiber, metamucil, citrucel are reasonable/affordable options for most people.  Inulin, chicory, psyllium husk are reasonable options but start slow and low in the dose to avoid gas/bloating until your gut gets acclimated (ramping up to 5-10 grams per day of supplemental fiber after 3-4 weeks if needed).      Example Meal Plan:  Breakfast: 450-475 Calories  1 egg cooked on low in olive oil:    calories.  5oz Greek Yogurt (Fage plain classic: ~150 jennifer)  Handful of Berries of your choice (about a calorie per berry or 20-40cal per handful)    cup(cooked) of  old fashioned oatmeal or 1/2 cup(cooked) steel cut oats. (150 jennifer)  Sprinkle amount of brown sugar and a pat of butter. (40 jennifer)  Glass of  Water  Black coffee or unsweetened Tea (0calories).      2-3 hours Later Snack: (195 calories).  Glass of water  One string Cheese (80 calories) or 4 oz creamed cottage cheese (115 calories) with  Crunchy Celery sticks (less than 10 calories per large stalk) 2 stalks. (20 calories)    of a  Large Banana or   of a Large Apple (60 calories):  eat second half at lunch or afternoon snack.     Lunch:300 -350 calories   Chicken Breast  (baked/broiled/roasted/grilled)  4-6 oz.  (125-180 jennifer), BBQ sauce/hot sauce/mustard/seasoning is free. Just use a reasonable amount. Or a can of tuna with 1 tablespoon mayonnaise.  Salad: lettuce, any other veggies (cucumbers, green peppers/celery you like and a small drizzle of dressing to just flavor.  Go as big on the veggies as you like,  as they are practically calorie free.   A whole, 8 inch cucumber is 45 calories, a whole green pepper is 23 calories, a stalk of celery is 9 calories.  [place] Dressing is 60 calories per tablespoon..so moderate your desired dressing or do a drizzle of olive oil and splash of balsamic vinegar on top,  Total calories unlikely to be over 150 even with dressing.  Glass of Water.    Option for lunch is meal replacement protein drink/smoothie.  Need at least 20 grams of protein and eat the rest of your apple/banana from the morning snack.      Afternoon Snack: 150-200 calories   Cheese Stick or cottage cheese again  and a fresh fruit OR  Granola Bar (protein Bar acceptable if under 200 calories OR  Homemade smoothies:  8oz skim milk,  a handful of berries (fresh or frozen and a serving of protein powder such as BiPro or Lety sWhey for  example.  If you don't like dairy, make with 8oz water, one small banana, handful of berries and the protein powder, add any veggies you want as well:  roughly 200 calories.   Glass of Water    Dinner: 325 calories  4oz of fresh, Atlantic salmon.  Broiled (salt/pepper/dill) for about 8-8.5 minutes (200calories) or  4oz filet mignon steak or sirloin steak  Salad or vegetable sautéed lightly in olive oil or   Broccoli 1.5  cups chopped and steamed  or micro-waved in a little water (75 calories)  Glass of Water,    Cup of herbal tea (unsweetened, caffeine free)      Herbs and seasonings are encouraged to flavor your foods/vegetables.  Make your food delicious.      Tips for Success:  1.  Prepare proteins ahead of time (broil chicken breasts in bulk so you can grab and go), steel cut oats/lentils can be stored in casserole dish/bowl in the fridge for quick scoop in the morning and rewarm in microwave, make use of crock pot recipes (watch salt content).  Making meals that cover 3-4 future meals is an easy way to stay on track.  2.  Drink a 8-12 oz glass of water every 2-3 hours when awake.  We often mistake hunger for thirst, especially when losing weight.  3. Remember your Reward and Motivation when things get hard.  4.  Weigh yourself every morning and record, you'll stay on track better and learn how our biorhythms, diet and elimination patterns show up on the scale. Don't worry about 1 or 2 day patterns, but when on track you'll notice good trend downward of weight over 3-4 day segments.  Plateaus tend to resolve after 4-8 days in most cases if you stay consistent with your plan.  These are natural and part of weight loss, even if you're perfect with your plan execution.  5. Call if problems/concerns.  [employer] is a great tool to stay in touch and provide weekly outside accountability. Check in with questions or if you want to brag.  6.  Find a handful of meals/foods that keep you on track and feeling good and get  "into a routine that is sustainable for you.  It's OK to have a routine that works for you.  7.  Consider taking a complete multivitamin just to make sure all micronutrients are adequate during weight loss.  8. If losing hair/brittle nails it usually means you are not taking enough protein.  Minimum goal is 60 grams daily of protein for smaller women, 80 grams a day for men. Consider taking Biotin as supplement or a \"Hair and Nail\" multivitamin.  .        What makes a person succeed with dramatic and sustained weight loss?    It's being at the right point in your life where you feel the need to lose the weight, not because anyone told you so but because of a voice inside of you that says, \"I am ready for this\".  You're now at a point where you may be feeling anxious, irritable and when you look in the mirror you do not recognize the person looking back.  Your healthy self is buried somewhere in that reflexion and you want to free it again.  This is the sort of motivation that leads to success, and it comes from you.    Because the only person that can lose that extra weight is you, I like my patients to focus on the mindset of being in Weight Loss Season.  This gives you permission to make the changes necessary to be consistent with the diet/activity and behavior changes that lead to successful, healthy weight loss.  Nearly any diet plan can work for weight loss, but keeping it healthy and nutrient based to prevent deficiencies/hair loss/fatigue or irritability is vital.  If you have a plan you want to try, we'll work with you to make sure no adjustments are needed to keep you healthy through your weight loss season and working with our Bariatric Dieticians you'll be given expert guidance to customize your diet plan to suit your particular needs. If you don't have your own ideas in mind, we are always happy to suggest well researched and validated plans that provide enough food to prevent hunger but still tap into " "your excess fat reserves and lose weight in a sustainable fashion.  There is great evidence that lean protein/healthy fat intake with good fiber intake while minimizing simple starches/carbs produces reliable/sustainable weight loss in most people. But some feel more connected to an intermittent fasting/fast mimicking or ketogenic diet.  These protocols can be hard for many to stick with and that's why we prefer the protein/healthy fat focused diets but if these alternative strategies appeal to you, we can work with you to optimize your knowledge and results with these tools.    Losing weight is a temporary commitment, but you need to be \"All In\" to have a good weight loss season.  To avoid frustration, you have to be willing to be on track 19 out of 20 days or even better than that. But, weight loss season is generally only 4-8 months in length. After that length of time, it can be hard to maintain a negative calorie balance and our brain, motivations and metabolism will usually bring you to a plateau that cannot be broken in this modern world where other commitments start to take priority. That's when we look to stabilize the weight loss you've achieved.  If you've reached your goal by that time, fantastic, and job well done.  If there is more to go, then after a few months of stabilization, we can usually attack that previous plateau and break into new territory.    Because of this time limitation, we want to really get to work right away and get into a sustainable routine ASAP.  One of the best predictors of how much weight you're going to lose throughout the season is how much you lose in the first 6 weeks, so prepare well and jump in with both feet.      Occasionally, people may feel like they cannot commit fully to the changes necessary and may want to change one thing at a time and \"get used to\" the idea of losing weight.  That is OK because that is where they are in their life, and they cannot fully commit " "for any number of reasons.  It's part of that internal motivation and they just haven't reached PRIORTY NUMBER ONE status yet. It's possible that what they need is more time to reach that point and I am always willing to work with people that want to \"dabble\", but understand, the amount of success obtained with half measures, is much less than half results. Behavior Change cannot occur until we prepare our minds, bodies and environment for what is to come, action!    As you go through your plan, look for things to keep your motivation rolling.  The most successful people have a goal or target/reward that they are working towards.  Having a reward that celebrates your new fitness, mobility and energy is the best sort because it will encourage you to do well with the weight maintenance phase and long term lifestyle changes that promotes keeping the weight off for the long term.  Usually, \"getting healthier\" or improving blood tests or losing weight so your clothes fit better is not as internally motivating as having a tangible reward.  A good weight loss season reward is one that isn't food based, is affordable, but something special:  Something you won't be getting/doing unless you achieve your goal.   It s important to keep to the rule of success:  in order to get the reward, your goal MUST be achieved. Write this reward down, where you can look at it daily and keep it in the front of your mind as you go through your weight loss season and it will help keep you on track.    Tools that help change behavior are vital for success. The most studied and most supported tool for weight loss is nothing more than writing down your food and weight every day.  Every Day.  Accurately and completely.  When you commit to weight loss season, this information tells you whether you're getting ENOUGH food to fuel your weight loss properly as well as teaches you the interaction between different foods you eat and how your body " responds with weight loss.  You'll see that sometimes after a heavy workout you don't see the scale move until 2-3 days later.  How saltier meals (chili for example) may make you retain water for 4-5 days before you see the weight come off, you'll get used to the mini-plateaus that develop after a good 3-8 lb drop in weight as well as how you break through if you keep working the diet as you should.    Weight loss is not a linear process, there are mini ups and downs.  Learning how your body loses weight and getting comfortable with that is very rewarding. The act of writing words on paper also solidifies your will power and commitment to the season of weight loss and that by itself changes your brain chemistry/appetite, motivation and prepares you for maximal success.     Behavior change is all about getting into a new routine.  The old habits and routine have to change because without changing the circumstances of how you gained your weight, it's unlikely you'll enjoy satisfying results. If you have snacking habits, like every time you walk through the kitchen you grab a little something, well, that habit has to change and be replaced by a new habit.  It can be something as simple as keeping a doodle pad on the counter that you make a few scribbles and then walk through the kitchen having not opened the cupboard, or starting with a glass of water and leaving the kitchen without anything else, or checking your food journal to see how many calories you have left for the day.  Boredom is the enemy as are the old habits. Break new ground and try to push those old habits into a deep hole.  There are apps/counseling options available that can help with some of the day to day urges/behaviors if you're struggling. One commercial product that does a good job is Noom.  Unfortunately, there is a subscription fee.    Finally, exercise always helps.  While not mandatory to lose weight, every little bit helps and exercise has  "so many other benefits that to not work it into your plan is to miss out on all the mood, sleep, stress and general health benefits that come from making yourself a little short of breath and sweaty at least 3-4 days out of the week.  The metabolism and calorie burn benefits aside, almost every chronic ailment in medicine gets better with proper, aerobic exercise.  Allow yourself to start slow and let your body prepare itself to accept harder training 4-6 weeks down the road, but start now and commit to a plan.  Whether you have the means to hire a , join a gym or just walk out your front door or go down to your basement for a video workout, get into a exercise routine and  after 3 weeks of at least 3 times a week exercise you should be at a point where you can slowly start ramping up 10% each week to our goal of at least 150-300minutes weekly of aerobic exercise and at least twice weekly resistance training/strenghtening with weights/bands or body weight exercises.     I am a big fan of modifying the free training plan, \"Couch to 5k\", for almost all of my patients. Just type it into [employer] or look it up on your smart phone tylor store.  To modify the plan,  you can use the training plan for whatever aerobic activity you do (bike/treadmill/elliptical/rower/pool/etc). During the \"jog\" intervals, you just move a little faster or harder or increase the tension or incline.  You use those little intervals to switch up the workout and recruit more muscles and pump the blood a little more and then recover again in the \"walk\" intervals by slowing down, decreasing the incline or turning down the tension.  3-4 days a week is not that much to ask and the benefits are enormous.  Start slow and develop the base from which you can then build on and reduce the risk of injury.  It's much more important 2-4 months from now to be enjoying your exercise then it is to over exert yourself at the start and hurt yourself.  Starting " slowly allows your body to accept the training better down the road when the exercise becomes crucial for weight maintenance.  Without exercise down the road during your maintenance phase, all this hard work you are about to put in can be undone. It usually takes about 100-300 calories a day of exercise to maintain a weight loss and our focus during weight loss season is to generate the routine/activities and hobbies that make that enjoyable/sustainable.    Thanks for taking this first and most important step in your weight loss season.  Commit to it and we will cheerlead you all the way to success.  When things get tough or off track we'll offer guidance and analysis and when you reach your goal we'll celebrate your success.  In the end, it is all about your success, your health and what you do with it.      Francisco Fine MD  [facility]  [phone]          LEAN PROTEIN SOURCES  Getting 20-30 grams of protein, 3 meals daily, is appropriate for most people, some need more but more than about 40 grams per meal is not useful.  General rule is drinking one ounce of water per gram of protein eaten over the course of the day:  70 grams of protein each day, drink 70 oz of water.  Protein Source Portion Calories Grams of Protein                           Nonfat, plain Greek yogurt    (10 grams sugar or less) 3/4 cup (6 oz)  12-17   Light Yogurt (10 grams sugar or less) 3/4 cup (6 oz)  6-8   Protein Shake 1 shake 110-180 15-30   Skim/1% Milk or lactose-free milk 1 cup ( 8 oz)  8   Plain or light, flavored soymilk 1 cup  7-8   Plain or light, hemp milk 1 cup 110 6   Fat Free or 1% Cottage Cheese 1/2 cup 90 15   Part skim ricotta cheese 1/2 cup 100 14   Part skim or reduced fat cheese slices 1 ounce 65-80 8     Mozzarella String Cheese 1 80 8   Canned tuna, chicken, crab or salmon  (canned in water)  1/2 cup 100 15-20   White fish (broiled, grilled, baked) 3 ounces  100 21   Angelica/Tuna (broiled, grilled, baked) 3 ounces 150-180 21   Shrimp, Scallops, Lobster, Crab 3 ounces 100 21   Pork loin, Pork Tenderloin 3 ounces 150 21   Boneless, skinless chicken /turkey breast                          (broiled, grilled, baked) 3 ounces 120 21   R[place], [place]ack[place], [place], and Venison 3 ounces 120 21   Lean cuts of red meat and pork (sirloin,   round, tenderloin, flank, ground 93%-96%) 3 ounces 170 21   Lean or Extra Lean Ground Turkey 1/2 cup 150 20   90-95% Lean [place] Burger 1 joy 140-180 21   Low-fat casserole with lean meat 3/4 cup 200 17   Luncheon Meats                                                        (turkey, lean ham, roast beef, chicken) 3 ounces 100 21   Egg (boiled, poached, scrambled) 1 Egg 60 7   Egg Substitute 1/2 cup 70 10   Nuts (limit to 1 serving per day)  3 Tbsp. 150 7   Nut [place] (peanut, almond)  Limit to 1 serving or less daily 1 Tbsp. 90 4   Soy Burger (varies) 1  15   Garbanzo, Black, [place] Beans 1/2 cup 110 7   Refried Beans 1/2 cup 100 7   Kidney and Lima beans 1/2 cup 110 7   Tempeh 3 oz 175 18   Vegan crumbles 1/2 cup 100 14   Tofu 1/2 cup 110 14   Chili (beans and extra lean beef or turkey) 1 cup 200 23   Lentil Stew/Soup 1 cup 150 12   Black Bean Soup 1 cup 175 12           Example Meal Plan for a 0703-3014 Calorie Diet:    In order to fuel your weight loss properly and avoid hunger-induced overeating later in the day, for your height and weight, you will enjoy the most success by following the diet below or similar with adjustments based on your particular tastes and preferences.  Exercise may influence speed, amount of weight loss further.     I recommend getting into a meal routine and keeping it similar day to day in the beginning so you don t have to think too hard about what you re going to make/eat.  Keep snacks healthy, ideally containing protein and some vegetables.  Non-processed food is preferable to packaged items.  Eat at least a few  crunchy green vegetables if having a snack, which should be 2-3 hours after your mealtimes(prepare these ahead of time for ease of use).  Drink 64 oz -80 oz of water daily for most, some of you will need more and we'll discuss it at your visit if that is the case.      When changing our diet,  we can often mistake thirst for hunger or just have some distracted eating habits that we need to break free from ('bored/mindless eating', screen time,work, driving,etc).  A glass of water and reconsideration of our hunger is often all that is needed.  Having the urge is not the problem, but watching it pass by without acting on it is the goal.    If you re having hunger problems, add a protein drink/snack to your morning hours or afternoon snack with at least 20grams of protein and not too much sugar (under 10g).  A carton of higher protein/low sugar yogurt can work as well.  If the urge to snack is overwhelming and not satiated, try going for a 10 minute walk/exercise, come home and drink a glass of water and if still hungry, have a  calorie snack (handful of raw/sprouted nuts, veggies and string cheese, protein bar, etc).  Savor it.    It is better to have a large breakfast, a moderate lunch and a smaller dinner to fuel your day.  People lose 10-15% more weight during their weight loss season with this strategy. Optimizing your protein intake at each meal will further keep you more satisfied while eating less food overall.  Getting exercise in early has also been shown to offer the best results (before breakfast ideally but anytime is the right time to exercise if that is not an option for you).    To make sure you re getting adequate vitamins and minerals during weight loss, I recommend one complete multivitamin a day of your choice.  Consider a probiotic and taking some vitamin D 2000 IU daily.    Let supper be your last meal of the day and ideally try to have at least 12 hours between supper and breakfast the next  day to tap into some beneficial overnight fasting dynamics.  Midnight snacks need to go away. Water in the evening is fine, unsweetened, non caffeinated herbal tea is helpful as well.  Consolidating your meals within a 8-12 hour period of your day will help tap into these additional metabolic benefits and tends to keep your appetite up for breakfast, further helping to stay on track.  For most of my patients, I don't recommend an intermittent fasting style diet (many find it hard to fit in their lifestyle) but an overnight fast is very doable for most patients and helps regulate our hunger drives a little better.  This makes it very important to nail good intake at all three meals to feel satisfied/energized and still lose weight.      If evening snacking desires are high, consider a glass of fiber supplement for some additional fullness (metamucil or similar). Most of us don't get the 25-30 grams per day of fiber that promotes good gut health/satiety.  Benefiber, metamucil, citrucel are reasonable/affordable options for most people.  Inulin, chicory, psyllium husk are reasonable options but start slow and low in the dose to avoid gas/bloating until your gut gets acclimated (ramping up to 5-10 grams per day of supplemental fiber after 3-4 weeks if needed).      Example Meal Plan:  Breakfast: 450-475 Calories  1 egg cooked on low in olive oil:   calories.  5oz Greek Yogurt (Fage plain classic: ~150 jennifer)  Handful of Berries of your choice (about a calorie per berry or 20-40cal per handful)    cup(cooked) of  old fashioned oatmeal or 1/2 cup(cooked) steel cut oats. (150 jennifer)  Sprinkle amount of brown sugar and a pat of butter. (40 jennifer)  Glass of  Water  Black coffee or unsweetened Tea (0calories).      2-3 hours Later Snack: (195 calories).  Glass of water  One string Cheese (80 calories) or 4 oz creamed cottage cheese (115 calories) with  Crunchy Celery sticks (less than 10 calories per large stalk) 2 stalks.  (20 calories)    of a  Large Banana or   of a Large Apple (60 calories):  eat second half at lunch or afternoon snack.     Lunch:300 -350 calories   Chicken Breast  (baked/broiled/roasted/grilled)  4-6 oz.  (125-180 jennifer), BBQ sauce/hot sauce/mustard/seasoning is free. Just use a reasonable amount. Or a can of tuna with 1 tablespoon mayonnaise.  Salad: lettuce, any other veggies (cucumbers, green peppers/celery you like and a small drizzle of dressing to just flavor.  Go as big on the veggies as you like,  as they are practically calorie free.   A whole, 8 inch cucumber is 45 calories, a whole green pepper is 23 calories, a stalk of celery is 9 calories.  [place] Dressing is 60 calories per tablespoon..so moderate your desired dressing or do a drizzle of olive oil and splash of balsamic vinegar on top,  Total calories unlikely to be over 150 even with dressing.  Glass of Water.    Option for lunch is meal replacement protein drink/smoothie.  Need at least 20 grams of protein and eat the rest of your apple/banana from the morning snack.      Afternoon Snack: 150-200 calories   Cheese Stick or cottage cheese again  and a fresh fruit OR  Granola Bar (protein Bar acceptable if under 200 calories OR  Homemade smoothies:  8oz skim milk,  a handful of berries (fresh or frozen and a serving of protein powder such as BiPro or Lety sWhey for example.  If you don't like dairy, make with 8oz water, one small banana, handful of berries and the protein powder, add any veggies you want as well:  roughly 200 calories.   Glass of Water    Dinner: 325 calories  4oz of fresh, [place] salmon.  Broiled (salt/pepper/dill) for about 8-8.5 minutes (200calories) or  4oz filet mignon steak or sirloin steak  Salad or vegetable sautéed lightly in olive oil or   Broccoli 1.5  cups chopped and steamed  or micro-waved in a little water (75 calories)  Glass of Water,    Cup of herbal tea (unsweetened, caffeine free)      Herbs and  "seasonings are encouraged to flavor your foods/vegetables.  Make your food delicious.      Tips for Success:  1.  Prepare proteins ahead of time (broil chicken breasts in bulk so you can grab and go), steel cut oats/lentils can be stored in casserole dish/bowl in the fridge for quick scoop in the morning and rewarm in microwave, make use of crock pot recipes (watch salt content).  Making meals that cover 3-4 future meals is an easy way to stay on track.  2.  Drink a 8-12 oz glass of water every 2-3 hours when awake.  We often mistake hunger for thirst, especially when losing weight.  3. Remember your Reward and Motivation when things get hard.  4.  Weigh yourself every morning and record, you'll stay on track better and learn how our biorhythms, diet and elimination patterns show up on the scale. Don't worry about 1 or 2 day patterns, but when on track you'll notice good trend downward of weight over 3-4 day segments.  Plateaus tend to resolve after 4-8 days in most cases if you stay consistent with your plan.  These are natural and part of weight loss, even if you're perfect with your plan execution.  5. Call if problems/concerns.  Buck is a great tool to stay in touch and provide weekly outside accountability. Check in with questions or if you want to brag.  6.  Find a handful of meals/foods that keep you on track and feeling good and get into a routine that is sustainable for you.  It's OK to have a routine that works for you.  7.  Consider taking a complete multivitamin just to make sure all micronutrients are adequate during weight loss.  8. If losing hair/brittle nails it usually means you are not taking enough protein.  Minimum goal is 60 grams daily of protein for smaller women, 80 grams a day for men. Consider taking Biotin as supplement or a \"Hair and Nail\" multivitamin.    On-the-Go Breakfast Ideas  As of 2015, the latest research shows what a huge impact eating breakfast has on losing weight and " feeling your best. People lose more weight when they make breakfast their biggest meal of the day compared to Dinner, but even if you cannot go to that degree, getting a breakfast that has at least 20 grams of protein and even a moderate amount of fat is ideal for maintaining good energy through the day and limits overeating in the evening hours.  The following are some quick and easy suggestions for at least getting something of substance into your body in the morning.  Enjoy!    Eating breakfast within 90 minutes of waking up is an important part of taking care of your body on a restricted calorie diet plan.  After sleeping for hours, your body is in need of fuel.  An ideal breakfast is a combination of protein, whole-grain carbohydrates, or fruit.  Here s why:    -Protein digests very slowly in the body, helping you feel more satisfied.  -Whole grains provide dietary fiber, which also digests slowly and helps keep your gut clean.  -Fruit is a great source of vitamins, minerals, and fiber.     Each one of these breakfast combinations has between 200-300 calories and 15-20 grams of protein.  Feel free to mix and match!    Bone Broth (chicken bone broth or beef bone broth) is a great way to boost protein content. 8oz of bone broth will typically have 9-12grams of protein for 40kcal of energy.    Protein: Choose  -1/2 cup low-fat cottage cheese  -2 hard boiled eggs , or one cooked in olive oil (low/slow heat).  -1 low fat string cheese stick  -1 Tablespoon natural peanut butter  -Econodata vegetarian sausage joy (found in freezer section)  -1 slice lowfat cheese  -6 oz 2% or lowfat Greek yogurt, such as Fage or Oikos.    PLUS    Whole Grains:  Choose   -1 whole wheat English muffin  -1 whole wheat jona, half  -1/2 Fiber One frozen muffin, thawed  -1/2 Fiber One toaster pastry  -1 whole wheat bagel thin  -1/2 cup Kashi cereal  -1 Kashi waffle (or other whole grain high-fiber waffle)  Aim for whole  grain/sprouted breads with at least 3g of fiber/slice if having bread. [place] is one such brand.    OR    Fruit: Choose  -1/3 cup blueberries  -1/2 banana (or a plantain- similar to a banana, yet smaller)  -1/2 cup cantaloupe cubes  -1 small apple  -1 small orange  -1/2 cup strawberries  -handful raspberries/blackberries (each berry is about 1 calorie).    *Adapted from [employer], Fall 20    Ten Breakfasts Under 250 calories    Ideally, getting between 350-600 calories  (depending on starting height and weight)for breakfast is ideal for avoiding hunger later in the day, adjust/add to the following accordingly:    One- 250 calories, 8.5 g protein  1 slice whole-grain toast   1 Tbsp peanut butter    banana    Two- 250 calories, 8 g protein    cup nonfat/lowfat yogurt  1/3rd cup diced no-sugar peaches  1/3rd cup cereal (like Special K, Cheerios, or bran flakes)    Three- 250 calories, 25 g protein  1 egg scrambled with 1 oz skim milk    cup shredded cheddar    whole grain English muffin  1 oz Dyess hampton  1 tsp margarine spread    Four- 225 calories, 25 g protein  1/2 cup Kashi Go-Lean cereal    cup skim milk mixed with 1 scoop Bariatric Advantage protein powder    cup no-sugar diced pears    Five- 250 calories, 20 g protein    cup oatmeal prepared with skim milk, 1 scoop protein powder, and sugar-free maple syrup    Six- 200 calories, 5 g protein  1 whole grain waffle, toasted  1 tablespoon creamy peanut or almond butter    Seven-  250 calories, 19 g protein  Breakfast sandwich: 1 slice whole grain toast, cut in half.  Add 1 scrambled egg and one slice cheddar  cheese.    Eight-  250 calories, 15 g protein  2 eggs scrambled with 1/3 cup frozen spinach (heat before adding to eggs) and 2 tablespoons low fat cream cheese.    Nine-  150 calories, 15 g protein  2/3rd cup cottage cheese    cup cantaloupe    Ten- 200 calories, 20 g protein  Fruit smoothie made with 4 oz. nonfat Greek yogurt,   cup berries, 1  scoop protein powder, and 4 oz skim milk.    Ten Lunches Under 250 Calories    Aim for lunch to be around 300-400 calories a day when trying to lose weight and get that protein in!    One- 200 calories, 11 g protein  1/3 cup tuna salad made with light harris on 1 slice whole grain bread  1 small peeled apple    Two- 250 calories, 16 g protein  1/3 cup lowfat cottage cheese    cup cooked green beans    small fruit cocktail (in natural juice)    Three- 200 calories, 11 g protein    grilled cheese sandwich on whole grain bread with lowfat cheese  2/3rd cup of tomato soup    Four- 250 calories, 22 g protein  Deli wrap: 1 oz sliced turkey, 1 oz sliced ham, 1 oz sliced chicken rolled up with 1 slice low-fat cheese  1 small orange    Five- 250 calories, 28 g protein  2/3rd cup chili with 1 oz shredded cheese  4 saltine crackers    Six- 250 calories, 22 g protein  1 cup fresh spinach with 2 oz chicken, 1/3rd cup mandarin oranges, and 2 tablespoons sliced almonds with 1 tablespoon  vinaigrette dressing    Seven- 200 calories, 11 g protein  1 Tbsp sugar-free preserves and 1 Tbsp peanut butter on 1 slice whole grain toast    cup nonfat/lowfat Greek yogurt    Eight- 250 calories, 18 g protein  1 small soft-shell chicken taco with 1 oz shredded cheese, lettuce, tomato, salsa, and 1 Tbsp light sour cream    cup black beans    Nine- 225 calories, 13 g protein  2 ounces baked chicken  1/4 cup mashed potatoes    cup green beans    Ten- 200 calories, 21 g protein  Deli jona: 2 oz roast beef or other deli meat with 1 tsp Geo mayonnaise and sliced tomato, onion, and lettuce  1/3rd cup cottage cheese      Ten Dinners Under 300 calories    If you're eating a large breakfast and medium lunch, keep dinner small.  300-400 calories is ideal for most people depending on their caloric needs.    One- 300 calories, 12 g protein  1-inch thick slice of turkey meatloaf    cup baked butternut squash    Two- 200 calories, 9 g protein  Bread-less BLT:  3 slices turkey hampton, sliced tomato, wrapped in a large lettuce leaf    cup peeled fruit    Three- 275 calories, 36 g protein  3 oz roasted chicken    cup cooked broccoli    cup shredded cheddar cheese    cup unsweetened applesauce    Four- 200 calories, 25 g protein  3 oz baked tilapia  1/3rd cup cooked carrots    cup yogurt    Five- 250 calories, 20 g protein  Grilled ham  n  Swiss: spread 2 tsp ghee or butter on 1 slice of whole grain bread.  Cut bread in half, layer 2 oz deli ham with 1 piece of Swiss cheese and grill until cheese is melted.    cup cooked vegetables    Six- 250 calories, 18 g protein  Vegetarian cheeseburger: 1 Boca cheeseburger topped with lettuce, onion, tomato, and ketchup/mustard    cup sweet potato fries    Seven- 250 calories, 18 g protein  Pork pot roast: 2 oz roasted pork loin, 1/3rd cup roasted carrots,   medium potato, cooked with   cup gravy    Eight- 330 calories, 25 g protein  2 oz meatballs (about 2 small meatballs)    cup spaghetti sauce  1/2 piece toast topped with 1 tsp ghee or butterand topped with garlic powder, toasted in oven    Nine- 250 calories, 16 g protein  Mexican pizza: one 8  corn tortilla topped with 2 oz chicken,   cup salsa, 2 tablespoons black beans, 2 tablespoons shredded cheese.  Bake until cheese is melted.    Ten- 250 calories, 22 g protein  Shrimp stir-layton: 3 oz cooked shrimp, 1/6th onion,   pepper,   cup chopped carrots sautéed in 1 tablespoon olive oil, topped with 2 tablespoons stir layton sauce and a pinch of sesame seeds        150 Calories or Less Snack Ideas   1 hardboiled egg with   cup berries  1 small apple with 1 hardboiled egg  10 almonds with   cup berries  2 clementines with 1 light string cheese  1 light string cheese with   sliced apple  1 light string cheese wrapped in 2 slices of turkey  4 100% whole wheat crackers (e.g. Triscuit) with 1 light string cheese    c. cottage cheese with   cup fruit and 1 Tbsp sunflower seeds     cup cottage  cheese with   of an avocado     can tuna fish with 1 cup sliced cucumbers     cup roasted garbanzo beans with paprika and cayenne pepper    baked sweet potato with   cup chili beans or   cup cottage cheese  2 oz. nitrate free turkey slices with 1 cup carrots  1 container (6 oz) of low sugar (less than 10 grams of sugar) greek yogurt   3 Tablespoons of hummus with 1 cup sliced bell peppers   2 Tablespoons of hummus with 15 baby carrots  4 Tablespoons ranch dip made with plain Greek Yogurt and 3 mini cucumbers  1/4 cup nuts (any kind)  1 Tablespoon peanut butter with 1 stalk celery   1 dill pickle wrapped in 1-2 slices of deli ham with 1 tsp of mayonnaise/mustard.  Exercise Guidance    Nearly everything that bothers us gets better when the proper amount of exercise can be done in the proper amounts.  Getting to that level safely and without injury is the key.  When it comes to weight loss, exercise is especially important in maintaining the weight loss.  Unfortunately, one of the harsh realities is that substantial weight loss slows our metabolism, often anywhere from 5-20%.    Our brain always remembers our heaviest weight and we can return to that if we're not mindful and moving regularly.  Our biology doesn't understand the concept of having too much energy, only not having enough.  As such, when we lose weight, it's thought that the brain interprets this as we're ill or in a famine and dials back our metabolism to limit further weight loss.  This is why exercise is so important in keeping the weight off and is the main reason people have some weight regain from their low weight point after weight loss.  We have to make up that 10-20% of calories not being burned.Since we can restrict our intake for only so long, exercise becomes very important in our long term healthy weigh maintenance to balance out the occasional indiscretion with our diet.    Generally, for every 5% body weight reduction in a weight loss  season, a person needs to add  kilocalories of exercise in their daily routine to keep that weight off for the long term.  This is why it's vital to be starting your fitness regimen during weight loss season, so that routine is well established as you move into your maintenance period.    Additionally, all sorts of good enzymes and genes turn on with exercise and our stress, sleep, mood and bodies feel better when we can get to the point of making ourselves a little sweaty and short of breath 35-50 minutes most days of the week. But we have to start with what we can do first and give ourselves permission to work our way up to this goal.    Who isn't ready for exercise? Well, if you get severe dizziness/palpitations, chest pain or short of breath/faint with even minimal activity like walking across a room or you're having to pause while going up a flight of stairs, then getting your heart and/or lungs fully evaluated prior to starting an exercise regimen is recommended. Everyone else can probably start a program, but everyone may start at a different point:  Some can set a 5-10 minute walking goal and others will be able to ride their bike for an hour.      Start with where you're at and look to add 10% more each week until you're at that 150 minutes or more a week (or 75 minutes/week or more of vigorous exercise). Moderate exercise can be estimated as the pace you can carry on a conversation and vigorous is the pace at which you can get 3-5 words out before having to take a breath.  If you're using heart rate monitoring, Moderate is about 60% of your maximum heart rate and vigorous about 75%. (Max heart rate estimated as 220 beats minus your age:  Example: 220-age of 45 =176 Beats per minute (BPM) maximum. 0.6X 176= 105 BPM (moderate), 132 BMP(vigorous)).    If you like to count steps, the 10,000 steps per day does correlate well with weight maintenance but try to make at least 20-25% of those steps at a brisk  "pace (like you are about to miss your bus).    Finally, if you are pressed for time, it's important to know that some exercise is better than none.  High Intensity Interval training (HIIT) is a good way to get as much out of a short period of working out. If you can't walk, use the stairs, bike or swim; you could use a punching/arm workout regimen for your activity.  The idea with HIIT is to have a 3-6 minute warm up period of low intensity and the 3-6 \"intervals\" where you push the intensity up and then recover and start the next interval. One study showed that 3 intervals of 20 seconds at \"Maximum Effort\" while either biking on a stationary bike or going up stairs and then having 100 seconds recovery time before the next Maximum Effort was equally as beneficial on cardiovascular fitness development as doing 30 minutes of moderately paced walking 3 days weekly over a 6 week period of time.  So intensity matters. You just need to be able to safely do your desired exercise without injury. There are many great HIIT exercises/routines out there. IF you're not doing much exercise currently, I recommend giving your self 2-3 weeks of moderate exercise, 3 days weekly minimum to get your bones/tendons/muscles used to exercise before going for High Intensity workouts.    If you like to use Apps on the phone, the couch to 5k tylor and 7 minute workout apps are nice places to start if you are reasonably healthy.  There are hundreds of other options out there.  Consider viewing Getableube if gentler exercise/movement is desired. Videos on [employer] and chair yoga for seniors exist and are free. Check them out and let's get that 3-4 days a week routine going.    Let's move!  Francisco Fine MD.   How much activity does it take to burn off the occasional treat?  Occasional treats or indulgence doesn't have to set back your weight loss season goals. But if the occasional treat turns into the daily chocolate habit or the chips after dinner, " "or a can of soda, then your progress will slow dramatically unless your activity and exercise can compensate for those empty, low nutrition/high calorie foods.  Here's some estimations of walking off your snacks depending on general weights.  You can use \"calorie burn calculators\" if you search in Google, more accurate estimations should ask height/weight/gender and if you have a smart watch/heart rate monitor fitness watch, then your personal burn rate will be much more accurate then these general numbers. But these are in the ballpark.  http://www.[employer]/xntkubwp-fjxjrb-kqhrabpiaj/ is a nice reference for many different activities with just entering weight and time spent doing anactivity.    Most pre-packaged snacks/treats or 12 oz cans of soda come in around 150 kcal (small bag of chips (11-12 chips), 2 Tollhouse Cookies, 12 oz Coke, 5 oz of wine(125 kcal), 2 oz Vodka (130kcal),etc).  To burn off this snack/indulgence, walking at a \"walking the dog\" pace or for most people that aren't in training/fitness mode, is about 2.5 MPH. If you move slower than this, you'll burn less calories. If you move faster than this,you'll burn more.     Calories burned are for a 30 minute walk, at 2.5 MPH (traveling 1.25 miles in 30 minutes):    Body Weight Calories Burned   175 lbs 120 kcal   200 lbs 137 kcal   225 lbs 153 kcal   250 lbs 171 kcal   300 lbs 204 kcal       Speed, intensity, hills and activity type (walk/bike/swim/chores/yardwork/etc) will determine how much energy you burn per hour.  As you can see, for most people, a small snack of 150 kcal is a 30 minute commitment to moving at a modest pace.  A quick stroll. In comparison, most people moving at a \"missing the bus\" walk pace of about 4 mph (or the pace that a fit person may  walk in a marathon race if they can't run anymore).    30 minutes at 4 MPH speedy walk/slow jog on level ground burn rate (2 miles covered in 30 minutes):    Body Weight " Calories Burned   175 lbs 198 kcal   200 lbs 227 kcal   225 lbs 255 kcal   250 lbs 283 kcal   300 lbs 340 kcal.     Finding loops in your neighborhood is easy to map out distances by using sites like, MapMyRun.com, MapMyRide.com, or Strava.com.    Get out there and earn it, burn it, or pay it forward.  Banking calories is always a good idea if you know an occasion is coming up where you plan to indulge. The goal for all adults around the work is at [id number] minutes weekly of moderate aerobic activity like those listed above (keeping heart rate at about 50-65% of your maximum heart rate calculation (roughly 220-Age in years (as long as not on heart slowing medicationslike Beta Blockers)).     Hopefully, this drives home the point that snacks need to be intentional during weight loss season as most of us struggle with finding 30-60minutes most days a week to exercise and it takes more than that in many cases to make up for the sweet/treats and indulgences that may have contributed to your weight gain over the years (roughly, a can of soda daily for a year will put a person at risk for a 10-15 lb weight gain each year).

## 2023-09-01 NOTE — PATIENT INSTRUCTIONS
Eating Plan -  Here's a way to visual your meals on your plate. This kind of plate balance is heavy on protein and fiber which can help with fullness and satiety.     Breakfast: 1/2 plate protein (~20-30 g protein) + 1/2 plate carb     Lunch: 1/3 plate protein (25-35 g protein or 4-6 oz) + 1/3 - 1/2 plate vegetable + 1/4 plate carb + healthy fat     Dinner: 1/3 plate protein (25-35 g protein or 4-6 oz) + 1/3 - 1/2 plate vegetable + 1/4 plate carb + healthy fat        Pick a food for each category to make a meal.     Here are some meal and food ideas for you below. Balance them as best you can like the above plan. Use food labels (look at serving size to see how much they are talking about and the protein content of that serving) to get an idea of protein content. It's okay to have more than one serving!        Whole Grains (carbs)  100% whole wheat breads and crackers  Bran, Oatmeal  Quinoa, couscous  Brown rice     Starchy Vegetables (carbs)  Corn  Green peas  Potato, sweet potato  [place], butternut squash  Beans (black, garbanzo, kidney, lima, navy, logan, white)  Lentils (all kinds)  Peas (split, black eyed)     Fruits (carbs)  Apples, unsweetened applesauce  Banana  Blueberries, blackberries  Cantaloupe, honeydew  Grapes  Oranges, pineapple  Raspberries, strawberries  Watermelon     Non-starchy Vegetables (vegetables)  Asparagus, zucchini  Green beans  Broccoli, cauliflower  Carrots, celery, cucumber  Onion  Salad greens (spinach, kale, lettuce, gabriella)  Bell pepper (all kinds)  Tomato     Milk/Yogurt (protein)  Light Yogurt  Light Greek Yogurt  Skim milk  Soy milk  Lactose-free skim milk     Proteins (protein - see lean protein list below for more info)  Cottage cheese  Eggs, egg whites  Beef (90/10 ground, sirloin, roast, tenderloin)  Fish (Cod, tilapia, salmon, tuna)  Pork (Armenian hampton, ham, pork loin chop, tenderloin)  Turkey or chicken (breast, thin sliced deli, lean ground)  Clams, crab, lobster,  shrimp  Light string cheese (50 jennifer)     Fats  Avocado  Peanut butter  Nuts (almonds, walnuts, sunflower seeds)  Olives  Salad dressing  Olive, canola, avocado, vegetable oil  Flax seed  Hemp Seed        Quick and Easy Meals for Rotational Menus    Salad kit with rotisserie chicken, eggs, lunch meat, beans or tuna    Lunch meat sandwich or wrap with veggies (sugar snap peas, bell pepper slices, carrot sticks, celery, sliced cucumber, cherry tomatoes, etc.)    Greek or light yogurt with a handful of nuts and fruit    Cottage cheese, cherry tomatoes and Triscuit crackers    Refried bean and cheese quesadilla on whole wheat tortilla    Can of Progresso Light or [place] Well Yes soup    [place] cakes pancake or waffles with peanut butter or berries    Baked sweet potato with black beans and salsa and a side salad    Adult lunchable: lunch meat, string cheese, crackers and veggies    Protein pasta salad: whole wheat or bean pasta with chicken sausage, broccoli and italian dressing    Egg sandwich on english muffin: egg, slice of cheese and piece of ham    Grilled cheese and turkey sandwich with a side salad or cup of soup    BBQ Flatbread: Flat Out high protein flatbread with rotisserie chicken, BBQ sauce and red onion, topped with mozzarella cheese and baked in the oven    [place] Chicken Wrap: Rotisserie chicken warmed up with Oscar's Hot Sauce in a medium size tortilla with light ranch dressing. Add mixed greens, red onion, diced tomato, 2% shredded cheddar cheese.                LEAN PROTEIN SOURCES     Protein Source   Portion   Calories   Grams of Protein                             Nonfat, plain Greek yogurt    (10 grams sugar or less) 3/4 cup (6 oz)  12-17   Light Yogurt (10 grams sugar or less) 3/4 cup (6 oz)  6-8   Protein Shake 1 shake 110-180 15-30   Skim/1% Milk or lactose-free milk 1 cup ( 8 oz)  8   Plain or light, flavored soymilk 1 cup  7-8   Plain or light, hemp milk 1 cup 110 6    Fat Free or 1% Cottage Cheese 1/2 cup 90 15   Part skim ricotta cheese 1/2 cup 100 14   Part skim or reduced fat cheese slices 1/4cup, 3 dice 65-80 8                                 Mozzarella String Cheese 1 80 8   Canned tuna, chicken, crab or salmon  (canned in water)  1/2 cup 100 15-20   White fish (broiled, grilled, baked) 3 ounces 100 21   [place]/Tuna (broiled, grilled, baked) 3 ounces 150-180 21   Shrimp, Scallops, Lobster, Crab 3 ounces 100 21   Pork loin, Pork Tenderloin 3 ounces 150 21   Boneless, skinless chicken /turkey breast                          (broiled, grilled, baked) 3 ounces 120 21   [place], [place], [place], and Venison 3 ounces 120 21   Lean cuts of red meat and pork (sirloin,   round, tenderloin, flank, ground 93%-96%) 3 ounces 170 21   Lean or Extra Lean Ground Turkey 1/2 cup 150 20   90-95% Lean [place] Burger 1 joy 140-180 21   Low-fat casserole with lean meat 3/4 cup 200 17   Luncheon Meats                                                        (turkey, lean ham, roast beef, chicken) 3 ounces 100 21   Egg (boiled, poached, scrambled) 1 Egg 60 7   Egg Substitute 1/2 cup 70 10   Nuts (limit to 1 serving per day)  3 Tbsp. 150 7   Nut Stollings (peanut, almond)  Limit to 1 serving or less daily 1 Tbsp. 90 4   Soy Burger (varies) 1  10-15   Edamame  1/2 cup ~95 9   Garbanzo, Black, [place] Beans 1/2 cup 110 7   Refried Beans 1/2 cup 100 7   Kidney and Lima beans 1/2 cup 110 7   Tempeh 3 oz 175 18   Vegan crumbles 1/2 cup 100 14   Tofu 1/2 cup 110 14   Chili (beans and extra lean beef or turkey) 1 cup 200 23   Lentil Stew/Soup 1 cup 150 12   Black Bean Soup 1 cup 175 12      More Meal Ideas     Breakfast         Omelet made with   cup to   cup egg substitute or 2 eggs    cup chopped vegetables  1-2 tbsp. of light cheese     cup salsa  Medium banana  1 cup non-fat plain, Greek yogurt mixed with 1 cup berries and 1-2 Tbsp nuts or cereal   -3/4 cup skim or 1% cottage cheese    cup unsweetened  whole-grain cereal  1/2 cup of fresh strawberries  Whole-wheat English muffin or mini bagel, 1 scrambled egg and 1 slice Swiss cheese   Small orange    cup cottage cheese, low-fat    cup fresh fruit      Lunch/Dinner    2-3 slices roasted turkey breast  1 tbsp. of fat free mayonnaise  2 slices of  whole-wheat bread, Medium apple  10 baby carrots with 1 tbsp. of low-fat dip     cup water packed tuna or chicken  1 tablespoons of low-fat mayonnaise  1-2 tbsp. dill relish  1 serving of whole-grain crackers  1 cup of strawberries   6 inch turkey sub sandwich with light mayonnaise,   cup cottage cheese                                                                                                                                                      Black bean and low-fat cheese on a whole wheat tortilla with salsa and light sour cream  Grilled chicken sandwich  Tossed salad with light dressing    Baked potato with 3/4 cup of extra lean ground beef, light shredded cheese and salsa  Fresh fruit                                                 Chicken chunks with lettuce and vegetables stuffed in jona  Steamed broccoli                                                 3 oz boneless/skinless chicken breast  1/2 cup brown rice with stir-fried vegetables    grapefruit  3 ounces of salmon, trout, or tuna  1 cup of steamed asparagus  1 small slice whole grain Italian bread  Broiled white or pink fish  3/4 cup whole wheat pasta with tomatoes  3/4 cup of roasted red peppers  3 oz. of extra lean (93/7) hamburger on a Arnold's [place] Thins  Tossed salad with light dressing    Black bean or Tuscan bean soup with grated mozzarella cheese    of a flour tortilla  3 ounces of grilled pork loin with 1 tbsp. of low-sugar barbeque sauce, 1 cup of green beans seasoned with pepper  Small dinner roll or   cup of grapefruit sections  1-2 cups of torn gabriella    cup of garbanzo beans or diced skinless chicken breast  5-6 cherry tomatoes  1  tbsp.  "of crumbled feta cheese  1 tbsp. of roasted soy nuts  1 tsp. of olive oil and 2-3 Tbsp. of balsamic or red wine vinegar  Small whole-wheat dinner roll or   cup of cut up pineapple       Carbohydrate Counting for Diabetes  What is carbohydrate? Carbohydrate refers to anything that breaks down into sugar including sugar.   What foods contain carbohydrate?    Grains and StarchyVegetables: bread, cereal, pasta, rice, potatoes, peas, corn   Fruit: all fruit including fresh, canned, frozen, and dried   Dairy: milk and yogurt   Sweets/Desserts: cookies, cake,pie, ice cream   Non-starchy Vegetables: green beans, broccoli, cauliflower, carrots, cucumbers    Why is it important? Carbohydrates impact blood sugars more quicklythan foods with protein and fat. Some carbohydrates bring blood sugars up more quickly than others. Look for foods that include higher amounts of fiber. Good sources of fiber include whole grains, fruitsand vegetables. Meals should include all three nutrients. For optimal nutrition, try to include carbohydrates from multiple food groups instead of coming just from one food group. For example, a meal might include:  Roasted sweet potato, asparagus sautéed in olive oil, baked chicken, and mixed berries    What is a serving of carbohydrate? 15 grams of carbohydrate = 1serving of carbohydrate  How much carbohydrate do I need? Your nutritionist will calculate how many grams of carbohydrate you need and help to divide them out throughout the day     Carbohydrate Counting Food Lists  One serving = 15 grams Carbohydrate    One serving = 15 grams Carbohydrate    FRUIT  (weight includes core, peel, and seeds)   Apple, unpeeled, small 1 (4 oz)   Applesauce, unsweetened   cup   Apples, dried 4 rings   Apricots fresh 4 whole (5   oz)   Banana 1 (4\" across)   Blackberries 1 cup   Blueberries   cup   Cantaloupe/Honeydew melon 1 cup cubed   Cherries, sweet, fresh 12 (3 oz)   Canned fruit   cup   Grapefruit, large   (11 " "oz)   Grapefruit, canned or Mandarin oranges, canned   cup   Grapes, small 17 (3 oz)   Kiwi 1 (3   oz)   Jose, small   fruit (5   oz) or   cup   Nectarine, small 1 (5 oz)   Orange, small 1 (6   oz)   Papaya   fruit (8 oz) or 1 cup cubed   Peach, medium, fresh 1 (6 oz)   Pear, large, fresh   (4 oz)   Pineapple, fresh   cup   Plums, small 2 (5 oz)   Raisins 2 Tbsp   Raspberries 1 cup   Strawberries 1   cup whole berries   Tangerines, small 2 (8 oz)   Watermelon 1 slices (13   oz) or 1   cup cubed     BREAD   Bagel   (1 oz)   Bread, reduced calorie 2 slices   Bread, whole-wheat, pumpernickel, rye 1 slice (1 oz)   English muffin     Hot dog or hamburger bun   (1 oz)   Lupe, 6\" across     Tortilla, corn or flour, 6\" 1   Waffle, 4.5\" square 1     CEREALS AND GRAINS   Bran cereals   cup   Cooked oatmeal/Grits/Cream of wheat   cup   Pasta or Rice cooked 1/3 cup    Shredded wheat   cup   Granola/Grape-Nuts   cup   Quinoa/Couscous 1/3 cup   Flour/Cornmeal (dry) 3 Tbsp     LEGUMES AND STARCHY VEGETABLES   Beans, baked 1/3 cup   Beans and peas, cooked: kidney, logan, white, split, black-eyed, garbanzo, lentils   cup   Lima beans, cooked   cup   Corn   cup   Corn, medium 3\" 1 (5 oz)   Potato, baked 1 small (3 oz)   Potato, mashed   cup   Squash, winter (acorn, butternut) 1 cup   Yam, sweet potato, plain   cup     FRUIT JUICE           cup = 4 oz   Apple/cider/orange/grapefruit/ pineapple juice   cup   Cranberry juice cocktail/grape juice 1/3 cup   Juice blends, reduced-calorie 1 cup     MILK   1 cup = 8 oz = 12 g Carb   Milk (skim, whole, 1%, 2%) 1 cup   Chocolate milk   cup   Evaporated milk   cup   Dry milk 1/3 cup   Buttermilk 1 cup   Plain, yogurt   cup     COMBINATION FOODS Carb   Burrito, bean, 7\" 1 burrito 45 g   Burrito, meat, 7\" 1 burrito 30 g   Casserole or hot dish 1 cup 30 g   Chili with beans 1 cup 30 g   Chow mein noodles   cup 15 g   Armenian Toast 1 slice 15 g   Granola bar 1 20 g   Hummus 1/3 cup 15 g   Lasagna, " "3\" x 4\" 1 piece 30 g   Macaroni & cheese  1 cup 45 g   Muffin, plain 1 small 15 g   Pancake, silver dollar 4\" across 15 g   Pasta salad 1 cup 45 g   Spaghetti or pasta sauce   cup 15 g   Stuffing, bread (prepared) 1/3 cup 15 g   Taco shell, 5\" across 2 15 g     NON-STARCHY VEGETABLES    cup cooked or 1 cup raw = 5 g Carb   Artichoke Leeks   Asparagus Mixed vegetables (without corn, peas, or pasta)   Beans (green, wax, Italian) Mushrooms   Bean sprouts Okra   Beets Onions   Broccoli Pea pods   [place] sprouts Peppers   Cabbage Radishes   Carrots Salad greens   Cauliflower Sauerkraut   Celery Spinach   Cucumber Summer squash   Eggplant Tomato (canned, sauce, juice)   Green onions or scallions Turnips   Greens (gen, kale, mustard, turnip) Water chestnuts   Kohlrabi Watercress    zucchini     CONDIMENTS Carb   Sugar/Honey/Brown sugar/Syrup 1 Tbsp 15 g   Jam or Jelly, regular 1 Tbsp 15 g   Ketchup   cup 20 g   Peanut Butter   cup 12 g   Barbeque/Sweet & Sour sauce 3 Tbsp 15 g   Gravy   cup 7.5 g   Marinade 1 Tbsp 7.5 g   Kelly sauce 1 oz 15 g       Low Carbohydrate Foods   Low carbohydrate foods have 5 or fewer grams of carbohydrate but in most cases still contain calories. They can affect blood sugar if consumed in large amounts so stick to the serving sizes listed.    Cream cheese/ Mayonnaise/ Salad dressing/ Sour cream/ avocado 1 Tbsp   Ketchup 1 Tbsp   Salsa   cup   Nuts 1 oz     Unlimited Use  Bouillon & broth Horseradish Soy sauce Taco sauce   Club soda, unsweetened Lemon or lime juice Worcestershire sauce Tabasco/ hot sauce   Garlic Mineral / Tonic water Pickles  Tea    Herbs Mustard Pimento Vinegar        Meat/Meat Substitutes (do not count towards carb at meals) Peanut butter/nut butter (1 T) Tofu   Beef, chicken, fish, ham, lamb, pork, seafood Cottage cheese & cheese Eggs   Choose Your Foods Food Lists for Diabetes. (2014). Academy of Nutrition and Dietetics, American Diabetes Association.       "
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
## 2023-09-01 NOTE — LETTER
9/1/2023         RE: Eleanor Elizabeth  [address]  [facility] [address]        Dear Colleague,    Thank you for referring your patient, Eleanor Elizabeth, to the [facility] BAR[facility]ATRICS [facility]ARE [place]. Please see a copy of my visit note below.    Eleanor Elizabeth is a 48 year old who is being evaluated via a billable video visit.      How would you like to obtain your AVS? MyChart  If the video visit is dropped, the invitation should be resent by: Send to e-mail at: [email or web address]  Will anyone else be joining your video visit? No          Medical Weight Loss Initial Diet Evaluation  Assessment:  This patient was referred by Dr. Fine for MNT as treatment for Morbid obesity which is impacting her type 2 DM, and pain.     Eleanor is presenting today for a new weight management nutrition consultation. Pt has had an initial appointment with Dr. Fine.    Weight loss medication:  Mounjaro . Metformin     Personal Goals: Wants to get her A1c back down and get rid of the DM dx.      Anthropometrics:    Pt's weight is 267 lbs   Initial weight: 267 lbs  Weight change: n/a  BMI: 45.92 kg/m2  Ideal body weight: 54.7 kg (120 lb 9.5 oz)  Adjusted ideal body weight: 81.4 kg (179 lb 5.7 oz)  Estimated RMR ([place]-St Jeor equation):  1830 kcals x 1.2 (sedentary) = 2200 kcals (for weight maintenance)      Recommended Protein Intake: 70-90 grams of protein/day    Medical History:  Patient Active Problem List   Diagnosis     Lumbar spinal stenosis     Diabetes mellitus, type 2 (H)      Diabetes: Yes, type 2 - on Metformin   HbA1c:  6.4% (9/1/2023)    Nutrition History:   Food allergies/intolerances/cultural or religous food customs: No Does not eat seafood.    Weight loss history: Patient was dx with DM in March of this year. Had GDM when she was pregnant with her now 8 year old son. Saw a nutritionist when she was pregnant. Does not have a routine eating schedule. Has hx with  skipping meals or relying on convince foods. Has a son who is a picky eater.  +Eats wheat bread.    Vitamins/Mineral Supplementation: Vit D, MVI, 2x Ca, Vit E, B complex, probiotic,     Dietary Recall:  7:30-4pm work schedule occasionally 6:30AM start.     Breakfast: Skips Or scrambled eggs   Lunch: snacks or ramen noodles  Dinner: fast food, sandwich, mac and cheese   Typical Snacks: beef stick   Overnight eating: No  Eating out: often, More that 3x per week    Beverages: Water, Crystal light, Mt Dew (significant amount in the past)    Exercise: no routine at this time. Averages 10-14k steps per day.     Nutrition Diagnosis (PES statement):   Morbid obesity related to nutrition knowledge defecit as evidence by patient report of dietary intake and BMI of 45.92 kg/m2     Disordered Eating Pattern (NB 1.5) related to obsessive desire, intake of food exceeding RMR as evidenced by frequent grazing, skipping meals     Nutrition Intervention  Food and/or Nutrient Delivery   Placed emphasis on importance of developing a healthy meal routine, aiming for 3 meals a day and no snacks.  Discussed using a protein supplement as a meal replacement.  Nutrition Education   Discussed with patient how to build a meal: the importance of including a lean/low fat protein at each meal, include a source of vegetables at a minimum of lunch and dinner and limiting carbohydrate intake  Educated on sources of lean protein, portion sizes, the amount of grams found in each source. Recommend patient to aim for 20-30g protein at each meal.  Discussed the importance of adequate hydration, with emphasis on drinking 64oz of water or zero calorie beverages per day.  Nutrition Counseling   Encouraged importance of developing routine exercise for health benefits and weight loss.      Goals established by patient:   Aim to eat breakfast every day (25-35g per meal)   Weaned form Mt Dew intake to 1 bottle per week.   Have a protein shake instead of  skipping meals         Handouts provided:  Meal pairings   Keys to success  Lean protein options     Assessment/Plan:    Pt will follow up in 4 month(s) with bariatrician and 1.5 month(s) with dietitian.       Video-Visit Details    Type of service:  Video Visit    Video Start Time (time video started): 4:06 PM    Video End Time (time video stopped): 4:34 PM    Originating Location (pt. Location): Home      Distant Location (provider location):  Off-site    Mode of Communication:  Video Conference via [facility]    Physician has received verbal consent for a Video Visit from the patient? Yes      Pau Mensah RD        Again, thank you for allowing me to participate in the care of your patient.        Sincerely,        Pau Mensah RD

## 2023-09-02 LAB — DEPRECATED CALCIDIOL+CALCIFEROL SERPL-MC: 61 UG/L (ref 20–75)

## 2023-09-05 LAB — VIT B1 PYROPHOSHATE BLD-SCNC: 315 NMOL/L

## 2023-10-26 ENCOUNTER — VIRTUAL VISIT (OUTPATIENT)
Dept: SURGERY | Facility: CLINIC | Age: 48
End: 2023-10-26
Payer: COMMERCIAL

## 2023-10-26 DIAGNOSIS — Z71.3 NUTRITIONAL COUNSELING: ICD-10-CM

## 2023-10-26 DIAGNOSIS — E66.01 OBESITY, CLASS III, BMI 40-49.9 (MORBID OBESITY) (H): Primary | ICD-10-CM

## 2023-10-26 DIAGNOSIS — E11.9 TYPE 2 DIABETES, HBA1C GOAL < 7% (H): ICD-10-CM

## 2023-10-26 PROCEDURE — 97803 MED NUTRITION INDIV SUBSEQ: CPT | Mod: VID

## 2023-10-26 NOTE — PATIENT INSTRUCTIONS
Recipe Resources  Websites  www.[email or web address].Educabilia  www.Tethis.Educabilia  www.OpSource.Educabilia  https://www.Cernium.Educabilia/recipes-ideas/recipes  [email or web address]://Solar [email or web address].[email or web address]/  www.FRAMED.Educabilia  https://www.Array Storm.com/  Recipes  Real Life, Good Food ([facility])

## 2023-10-26 NOTE — PROGRESS NOTES
Eleanor Elizabeth is a 48 year old who is being evaluated via a billable video visit.      How would you like to obtain your AVS? MyChart  If the video visit is dropped, the invitation should be resent by: Send to e-mail at: [email or web address]  Will anyone else be joining your video visit? No         Medical  Weight Loss Follow-Up Diet Evaluation  Assessment:  Eleanor is presenting today for a follow up weight management nutrition consultation.  This patient has had an initial appointment and was referred by Dr. Fine for MNT as treatment for Morbid obesity which is impacting her type 2 DM, and pain.      Weight loss medication:  Mounjaro . Metformin   Pt's weight is unknown at this time.  Initial weight: 267 lbs  Weight change: unknown          No data to display              BMI: There is no height or weight on file to calculate BMI.  Ideal body weight: 54.7 kg (120 lb 9.5 oz)  Adjusted ideal body weight: 81.4 kg (179 lb 5.7 oz)    Estimated RMR ([place]-St Jeor equation):  1830 kcals x 1.2 (sedentary) = 2200 kcals (for weight maintenance)  Recommended Protein Intake: 70-90 grams of protein/day  Patient Active Problem List:  Patient Active Problem List   Diagnosis    Lumbar spinal stenosis    Diabetes mellitus, type 2 (H)     Diabetes: Yes, type 2 - on Metformin   HbA1c:  6.4% (9/1/2023)    Progress on goals from last visit: Patient report she is doing well with nutritional habits. Has had 2 surgery's since our last visit. Has noticed non scale weight changes (clothing fitting looser, mobility improvement, increased energy, less pain in her feet). Overall she is doing great at tracking calorie intake, limiting calore containing beverages and prepping snacks and meals. Fruit and vegetable intake is good and fiber intake is good.   + under 1,500 kcal per day  + participating in a nutritional program via her insurance that helps her track her eating habits.    Aim to eat breakfast every day (25-35g per  meal) - met but not protein goal   Weaned form Mt Dew intake to 1 bottle per week. - met   Have a protein shake instead of skipping meals - met     Dietary Recall:  Breakfast: overnight oats   Lunch: leftovers Or cabbage soup  Dinner: protein shake Or ribs, broccoli and corn bread   Typical snacks: 3 oz turkey breast and hard boiled egg.   Eating out: none recently   Beverages: Water, with crystal light   Exercise: walks often during the day, aims for 10k steps daily     Nutrition Diagnosis:    Morbid obesity related to nutrition knowledge defecit as evidence by patient report of dietary intake and BMI of 45.92 kg/m2          Intervention:  Food and/or nutrient delivery: consistency with meals and protein focus. Discussed ways to increase protein at meal time without adjusting food prepared too much.  Nutrition counseling: goat setting, continued encouragement and support.    Monitoring/Evaluation:    Goals:  Add protein to breakfast and soup when preparing ahead of time  Continue with currently goals set in place  Get recent weight recorded before seeing Dr. Fine     Patient to follow up in 2 month(s) with bariatrician and 4 month(s) with JULIANO        Video-Visit Details    Type of service:  Video Visit    Video Start Time (time video started): 4:04 PM    Video End Time (time video stopped): 4:30 PM    Originating Location (pt. Location): Home      Distant Location (provider location):  Off-site    Mode of Communication:  Video Conference via [facility]    Physician has received verbal consent for a Video Visit from the patient? Yes      Pau Mensah RD

## 2023-10-26 NOTE — LETTER
10/26/2023         RE: Eleanor Elizabeth  [address] [facility] [address]        Dear Colleague,    Thank you for referring your patient, Eleanor Elizabeth, to the [facility] [place]. Please see a copy of my visit note below.    Eleanor Elizabeth is a 48 year old who is being evaluated via a billable video visit.      How would you like to obtain your AVS? MyChart  If the video visit is dropped, the invitation should be resent by: Send to e-mail at: [email or web address]  Will anyone else be joining your video visit? No         Medical  Weight Loss Follow-Up Diet Evaluation  Assessment:  Eleanor is presenting today for a follow up weight management nutrition consultation.  This patient has had an initial appointment and was referred by Dr. Fine for MNT as treatment for Morbid obesity which is impacting her type 2 DM, and pain.      Weight loss medication:  Mounjaro . Metformin   Pt's weight is unknown at this time.  Initial weight: 267 lbs  Weight change: unknown          No data to display              BMI: There is no height or weight on file to calculate BMI.  Ideal body weight: 54.7 kg (120 lb 9.5 oz)  Adjusted ideal body weight: 81.4 kg (179 lb 5.7 oz)    Estimated RMR ([place]-St Jeor equation):  1830 kcals x 1.2 (sedentary) = 2200 kcals (for weight maintenance)  Recommended Protein Intake: 70-90 grams of protein/day  Patient Active Problem List:  Patient Active Problem List   Diagnosis     Lumbar spinal stenosis     Diabetes mellitus, type 2 (H)     Diabetes: Yes, type 2 - on Metformin   HbA1c:  6.4% (9/1/2023)    Progress on goals from last visit: Patient report she is doing well with nutritional habits. Has had 2 surgery's since our last visit. Has noticed non scale weight changes (clothing fitting looser, mobility improvement, increased energy, less pain in her feet). Overall she is doing great at tracking calorie intake, limiting calore  containing beverages and prepping snacks and meals. Fruit and vegetable intake is good and fiber intake is good.   + under 1,500 kcal per day  + participating in a nutritional program via her insurance that helps her track her eating habits.    Aim to eat breakfast every day (25-35g per meal) - met but not protein goal   Weaned form Mt Dew intake to 1 bottle per week. - met   Have a protein shake instead of skipping meals - met     Dietary Recall:  Breakfast: overnight oats   Lunch: leftovers Or cabbage soup  Dinner: protein shake Or ribs, broccoli and corn bread   Typical snacks: 3 oz turkey breast and hard boiled egg.   Eating out: none recently   Beverages: Water, with crystal light   Exercise: walks often during the day, aims for 10k steps daily     Nutrition Diagnosis:    Morbid obesity related to nutrition knowledge defecit as evidence by patient report of dietary intake and BMI of 45.92 kg/m2          Intervention:  Food and/or nutrient delivery: consistency with meals and protein focus. Discussed ways to increase protein at meal time without adjusting food prepared too much.  Nutrition counseling: goat setting, continued encouragement and support.    Monitoring/Evaluation:    Goals:  Add protein to breakfast and soup when preparing ahead of time  Continue with currently goals set in place  Get recent weight recorded before seeing Dr. Fine     Patient to follow up in 2 month(s) with bariatrician and 4 month(s) with RD        Video-Visit Details    Type of service:  Video Visit    Video Start Time (time video started): 4:04 PM    Video End Time (time video stopped): 4:30 PM    Originating Location (pt. Location): Home      Distant Location (provider location):  Off-site    Mode of Communication:  Video Conference via [facility]    Physician has received verbal consent for a Video Visit from the patient? Yes      Pau Mensah RD           Again, thank you for allowing me to participate in the care of  your patient.        Sincerely,        Pau Mensah RD

## 2023-12-07 ENCOUNTER — TELEPHONE (OUTPATIENT)
Dept: SURGERY | Facility: CLINIC | Age: 48
End: 2023-12-07

## 2023-12-07 ENCOUNTER — VIRTUAL VISIT (OUTPATIENT)
Dept: SURGERY | Facility: CLINIC | Age: 48
End: 2023-12-07
Payer: COMMERCIAL

## 2023-12-07 VITALS — BODY MASS INDEX: 44.65 KG/M2 | WEIGHT: 252 LBS | HEIGHT: 63 IN

## 2023-12-07 DIAGNOSIS — Z76.89 ENCOUNTER FOR WEIGHT MANAGEMENT: ICD-10-CM

## 2023-12-07 DIAGNOSIS — E66.813 CLASS 3 DRUG-INDUCED OBESITY WITH SERIOUS COMORBIDITY AND BODY MASS INDEX (BMI) OF 40.0 TO 44.9 IN ADULT (H): Primary | ICD-10-CM

## 2023-12-07 DIAGNOSIS — E11.9 CONTROLLED TYPE 2 DIABETES MELLITUS WITHOUT COMPLICATION, WITHOUT LONG-TERM CURRENT USE OF INSULIN (H): ICD-10-CM

## 2023-12-07 DIAGNOSIS — E66.1 CLASS 3 DRUG-INDUCED OBESITY WITH SERIOUS COMORBIDITY AND BODY MASS INDEX (BMI) OF 40.0 TO 44.9 IN ADULT (H): Primary | ICD-10-CM

## 2023-12-07 PROCEDURE — 99214 OFFICE O/P EST MOD 30 MIN: CPT | Mod: VID | Performed by: EMERGENCY MEDICINE

## 2023-12-07 ASSESSMENT — PAIN SCALES - GENERAL: PAINLEVEL: NO PAIN (0)

## 2023-12-07 NOTE — LETTER
12/7/2023         RE: Eleanor Elizabeth  [address] [facility] [address]        Dear Colleague,    Thank you for referring your patient, Eleanor Elizabeth, to the [facility] [place]. Please see a copy of my visit note below.    Virtual Visit Details    Type of service:  Video Visit     Originating Location (pt. Location): Home    Distant Location (provider location):  On-site  Platform used for Video Visit: [facility]  Bariatric Clinic Follow-Up Visit:    Eleanor Elizabeth is a 48 year old  female with Body mass index is 44.29 kg/m .  presenting here today for follow-up on non-surgical efforts for weight loss. Original Intake visit occurred on 9/1/23 with a weight of 267 lbs and BMI of 45.9.  Along with diet and behavior changes, she has been using tirzepatide to assist her weight loss goals.  See her intake visit notes for details on identified contributors to weight gain in the past. Chart review shows Dietician calculated RMR of 1830kcal/day and protein intake goal of 70-90g/day..  Down 22 lbs from April 2023, and down 15 lbs from start of her weight management clinic introductory visit, a 5.6% total body weight reduction in the last 3 months.  Weight:   Wt Readings from Last 5 Encounters:   12/07/23 114.3 kg (252 lb)   09/01/23 121.3 kg (267 lb 8 oz)   04/15/23 124.3 kg (274 lb)   04/10/23 124.3 kg (274 lb)    pounds      Comorbidities:  Patient Active Problem List   Diagnosis     Lumbar spinal stenosis     Diabetes mellitus, type 2 (H)       Current Outpatient Medications:      aspirin (ASA) 81 MG EC tablet, Take 1 tablet (81 mg) by mouth daily, Disp: 30 tablet, Rfl: 0     Blood Glucose Monitoring Suppl (CONTOUR NEXT ONE) KIT, 1 DEVICE BY NOT IN USE ROUTE . FOR 1 DOSE. USE AS DIRECTED., Disp: , Rfl:      cholecalciferol (VITAMIN D3) 10 mcg (400 units) TABS tablet, Take 10 mcg by mouth daily, Disp: , Rfl:      CONTOUR NEXT TEST test strip, Use to test bg 1 time  daily. Use as directed., Disp: , Rfl:      lisinopril (ZESTRIL) 10 MG tablet, Take 1 tablet (10 mg) by mouth daily, Disp: 30 tablet, Rfl: 1     metFORMIN (GLUCOPHAGE XR) 500 MG 24 hr tablet, Take 2 tablets (1,000 mg) by mouth daily (with dinner), Disp: 60 tablet, Rfl: 0     Microlet Lancets MISC, USE 1 EACH TO TEST DAILY., Disp: , Rfl:      Multiple Vitamins-Iron (MULTIPLE VITAMIN/IRON OR), Take 1 tablet by mouth daily, Disp: , Rfl:      norethindrone (MICRONOR) 0.35 MG tablet, Take 0.35 mg by mouth At Bedtime, Disp: , Rfl:      rosuvastatin (CRESTOR) 10 MG tablet, Take 10 mg by mouth daily, Disp: , Rfl:      tirzepatide (MOUNJARO) 10 MG/0.5ML pen, Inject 10 mg Subcutaneous every 7 days, Disp: 2 mL, Rfl: 6     acetaminophen (TYLENOL) 325 MG tablet, Take 2 tablets (650 mg) by mouth every 4 hours as needed for other (For optimal non-opioid multimodal pain management to improve pain control.) (Patient not taking: Reported on 12/7/2023), Disp: 100 tablet, Rfl: 0     gabapentin (NEURONTIN) 300 MG capsule, Take 300 mg by mouth 3 times daily (Patient not taking: Reported on 9/1/2023), Disp: , Rfl:      hydrOXYzine (ATARAX) 25 MG tablet, Take 1 tablet (25 mg) by mouth every 6 hours as needed for other (adjuvant pain) (Patient not taking: Reported on 9/1/2023), Disp: 30 tablet, Rfl: 0     tiZANidine (ZANAFLEX) 2 MG tablet, Take 1-2 tablets (2-4 mg) by mouth 3 times daily as needed for muscle spasms (Patient not taking: Reported on 9/1/2023), Disp: 20 tablet, Rfl: 0      Interim: Since our last visit, she has been tolerating her mounjaro well, energy is improving, moving easier and feet hurt much less.  Can get up her stairs without troubles now.     Has PCP follow up for A1c check next week.  Lab Results   Component Value Date    A1C 6.4 09/01/2023    A1C 7.0 04/10/2023   Blood sugars in the 80-100mg/dL range.  Has been working with Dietician, approaching meals with getting breakfast in within 90 minutes of waking up. Had  tendency to skip breakfast before and doing better now. Overnight oats prepped Sundays. Starts w/ 1 cup oats with grapes/banana.  Egg w/ lean turkey breast mid morning.   Satiety much improved on medication.   Plan:   1.  Diet: aiming for 1375-1500kcal/day with 60-75 grams of lean protein daily should continue excellent nourishment of your weight loss goals this winter.   Continue mindful morning intake to start the day well and stabilize blood sugar well.   2. Exercise: less pain now with walking. I'd recommend using some non weight bearing execise to avoid joint injury this winter: swimming/strength work, pilates/yoga, exercise bicycle or elliptical fitness.  Anything beyond 10 minutes most days of the week improves our body through exercise.   3. Medication: Mounjaro very efficacious in improved blood sugar control. Recheck A1c next week as planned. 10mg/week dose will continue the next 6 months or so and we'll see if dose escalation is needed beyond that. Separate beverages from meals to make sure you get enough nourishment in at meal time and hydrate well between meals to hit your 64-75 oz of water in each day.   4. Down over 5% body weight reduction already, 15 lbs in 3 months. Well done. Medium term goal this winter/early Spring of getting BMI under 40 (currently at 44.3)/weight under 230 lbs.   5. Goals: improved A1c and losing weight enough to put diabetes into remission over the next 2 years if getting weight close to 200 lbs.       We discussed [facility] Bariatric Basics including:  -eating 3 meals daily  -reviewed metabolic needs for weight loss based on Resting Metabolic Rate  -protein goals supportive of healthy weight loss  -avoiding/limiting calorie containing beverages  -We discussed the importance of restorative sleep and stress management in maintaining a healthy weight.  -We discussed the National Weight Control Registry healthy weight maintenance strategies and ways to optimize metabolism.  -We  "discussed the importance of physical activity including cardiovascular and strength training in maintaining a healthier weight and explored viable options.      Most recent labs:  Lab Results   Component Value Date    WBC 11.4 (H) 09/01/2023    HGB 15.2 09/01/2023    HCT 42.3 09/01/2023    MCV 88 09/01/2023     09/01/2023     No results found for: \"CHOL\"  No results found for: \"HDL\"  No components found for: \"LDLCALC\"  No results found for: \"TRIG\"  No results found for: \"CHOLHDL\"  Lab Results   Component Value Date    ALT 38 09/01/2023    AST 42 09/01/2023    ALKPHOS 87 09/01/2023     No results found for: \"HGBA1C\"  Lab Results   Component Value Date    B12 726 09/01/2023     No components found for: \"VITDT1\"  No results found for: \"RAI\"  Lab Results   Component Value Date    PTHI 14 (L) 09/01/2023     No results found for: \"ZN\"  Lab Results   Component Value Date    VIB1WB 315 (H) 09/01/2023     Lab Results   Component Value Date    TSH 1.15 09/01/2023     No results found for: \"TEST\"    DIETARY HISTORY  Tracking technique: regular blood sugar checks  Positive Changes Since Last Visit: less pain walking, improved blood sugars, better breakfasts  Struggling With: not much at this point. No ill effects of Mounjaro    Getting Adequate Protein: yes  Sleep schedule: n/a.      PHYSICAL ACTIVITY PATTERNS:  Cardiovascular: walks minimally, can get up stairs now without pain   Strength Training: limited to chores.    REVIEW OF SYSTEMS  Feels happy about progress. No low blood sugars. Less joint pain..  PHYSICAL EXAM:  Vitals: Ht 1.607 m (5' 3.25\")   Wt 114.3 kg (252 lb)   BMI 44.29 kg/m    Weight:   Wt Readings from Last 3 Encounters:   12/07/23 114.3 kg (252 lb)   09/01/23 121.3 kg (267 lb 8 oz)   04/15/23 124.3 kg (274 lb)         GEN: Pleasant, well groomed, in no acute distress  HEENT:  normal facies .  NECK: No swelling.  HEART: .  LUNGS: No respiratory difficulty noted. No cough. .  ABDOMEN: " obese.  EXTREMITIES: No tremor.. seated on couch  NEURO: Alert and Oriented X3, fluent speech. .  SKIN: No visible rashes. .    Interim study results: see above.   TSH   Date Value Ref Range Status   09/01/2023 1.15 0.30 - 4.20 uIU/mL Final     Last Comprehensive Metabolic Panel:  Sodium   Date Value Ref Range Status   09/01/2023 141 136 - 145 mmol/L Final     Potassium   Date Value Ref Range Status   09/01/2023 4.5 3.4 - 5.3 mmol/L Final   04/11/2023 3.8 3.5 - 5.0 mmol/L Final     Chloride   Date Value Ref Range Status   09/01/2023 104 98 - 107 mmol/L Final   04/11/2023 107 98 - 107 mmol/L Final     Carbon Dioxide (CO2)   Date Value Ref Range Status   09/01/2023 25 22 - 29 mmol/L Final   04/11/2023 24 22 - 31 mmol/L Final     Anion Gap   Date Value Ref Range Status   09/01/2023 12 7 - 15 mmol/L Final   04/11/2023 8 5 - 18 mmol/L Final     Glucose   Date Value Ref Range Status   09/01/2023 100 (H) 70 - 99 mg/dL Final   04/11/2023 141 (H) 70 - 125 mg/dL Final     GLUCOSE BY METER POCT   Date Value Ref Range Status   04/12/2023 120 (H) 70 - 99 mg/dL Final     Urea Nitrogen   Date Value Ref Range Status   09/01/2023 8.6 6.0 - 20.0 mg/dL Final   04/11/2023 10 8 - 22 mg/dL Final     Creatinine   Date Value Ref Range Status   09/01/2023 0.72 0.51 - 0.95 mg/dL Final     GFR Estimate   Date Value Ref Range Status   09/01/2023 >90 >60 mL/min/1.73m2 Final     Calcium   Date Value Ref Range Status   09/01/2023 10.2 (H) 8.6 - 10.0 mg/dL Final     Bilirubin Total   Date Value Ref Range Status   09/01/2023 0.7 <=1.2 mg/dL Final     Alkaline Phosphatase   Date Value Ref Range Status   09/01/2023 87 35 - 104 U/L Final     ALT   Date Value Ref Range Status   09/01/2023 38 0 - 50 U/L Final     Comment:     Reference intervals for this test were updated on 6/12/2023 to more accurately reflect our healthy population. There may be differences in the flagging of prior results with similar values performed with this method. Interpretation  of those prior results can be made in the context of the updated reference intervals.       AST   Date Value Ref Range Status   09/01/2023 42 0 - 45 U/L Final     Comment:     Reference intervals for this test were updated on 6/12/2023 to more accurately reflect our healthy population. There may be differences in the flagging of prior results with similar values performed with this method. Interpretation of those prior results can be made in the context of the updated reference intervals.               Lab Results   Component Value Date    A1C 6.4 09/01/2023    A1C 7.0 04/10/2023     .      30 minutes spent by me on the date of the encounter doing chart review, history and exam, documentation and further activities per the note   Francisco Fine MD  [facility]  2:09 PM  12/7/2023      Again, thank you for allowing me to participate in the care of your patient.        Sincerely,        Francisco Fine MD

## 2023-12-07 NOTE — PROGRESS NOTES
Virtual Visit Details    Type of service:  Video Visit     Originating Location (pt. Location): Home    Distant Location (provider location):  On-site  Platform used for Video Visit: [facility]  Bariatric Clinic Follow-Up Visit:    Eleanor Elizabeth is a 48 year old  female with Body mass index is 44.29 kg/m .  presenting here today for follow-up on non-surgical efforts for weight loss. Original Intake visit occurred on 9/1/23 with a weight of 267 lbs and BMI of 45.9.  Along with diet and behavior changes, she has been using tirzepatide to assist her weight loss goals.  See her intake visit notes for details on identified contributors to weight gain in the past. Chart review shows Dietician calculated RMR of 1830kcal/day and protein intake goal of 70-90g/day..  Down 22 lbs from April 2023, and down 15 lbs from start of her weight management clinic introductory visit, a 5.6% total body weight reduction in the last 3 months.  Weight:   Wt Readings from Last 5 Encounters:   12/07/23 114.3 kg (252 lb)   09/01/23 121.3 kg (267 lb 8 oz)   04/15/23 124.3 kg (274 lb)   04/10/23 124.3 kg (274 lb)    pounds      Comorbidities:  Patient Active Problem List   Diagnosis    Lumbar spinal stenosis    Diabetes mellitus, type 2 (H)       Current Outpatient Medications:     aspirin (ASA) 81 MG EC tablet, Take 1 tablet (81 mg) by mouth daily, Disp: 30 tablet, Rfl: 0    Blood Glucose Monitoring Suppl (CONTOUR NEXT ONE) KIT, 1 DEVICE BY NOT IN USE ROUTE . FOR 1 DOSE. USE AS DIRECTED., Disp: , Rfl:     cholecalciferol (VITAMIN D3) 10 mcg (400 units) TABS tablet, Take 10 mcg by mouth daily, Disp: , Rfl:     CONTOUR NEXT TEST test strip, Use to test bg 1 time daily. Use as directed., Disp: , Rfl:     lisinopril (ZESTRIL) 10 MG tablet, Take 1 tablet (10 mg) by mouth daily, Disp: 30 tablet, Rfl: 1    metFORMIN (GLUCOPHAGE XR) 500 MG 24 hr tablet, Take 2 tablets (1,000 mg) by mouth daily (with dinner), Disp: 60 tablet, Rfl: 0    Microlet Lancets  MISC, USE 1 EACH TO TEST DAILY., Disp: , Rfl:     Multiple Vitamins-Iron (MULTIPLE VITAMIN/IRON OR), Take 1 tablet by mouth daily, Disp: , Rfl:     norethindrone (MICRONOR) 0.35 MG tablet, Take 0.35 mg by mouth At Bedtime, Disp: , Rfl:     rosuvastatin (CRESTOR) 10 MG tablet, Take 10 mg by mouth daily, Disp: , Rfl:     tirzepatide (MOUNJARO) 10 MG/0.5ML pen, Inject 10 mg Subcutaneous every 7 days, Disp: 2 mL, Rfl: 6    acetaminophen (TYLENOL) 325 MG tablet, Take 2 tablets (650 mg) by mouth every 4 hours as needed for other (For optimal non-opioid multimodal pain management to improve pain control.) (Patient not taking: Reported on 12/7/2023), Disp: 100 tablet, Rfl: 0    gabapentin (NEURONTIN) 300 MG capsule, Take 300 mg by mouth 3 times daily (Patient not taking: Reported on 9/1/2023), Disp: , Rfl:     hydrOXYzine (ATARAX) 25 MG tablet, Take 1 tablet (25 mg) by mouth every 6 hours as needed for other (adjuvant pain) (Patient not taking: Reported on 9/1/2023), Disp: 30 tablet, Rfl: 0    tiZANidine (ZANAFLEX) 2 MG tablet, Take 1-2 tablets (2-4 mg) by mouth 3 times daily as needed for muscle spasms (Patient not taking: Reported on 9/1/2023), Disp: 20 tablet, Rfl: 0      Interim: Since our last visit, she has been tolerating her mounjaro well, energy is improving, moving easier and feet hurt much less.  Can get up her stairs without troubles now.     Has PCP follow up for A1c check next week.  Lab Results   Component Value Date    A1C 6.4 09/01/2023    A1C 7.0 04/10/2023   Blood sugars in the 80-100mg/dL range.  Has been working with Dietician, approaching meals with getting breakfast in within 90 minutes of waking up. Had tendency to skip breakfast before and doing better now. Overnight oats prepped Sundays. Starts w/ 1 cup oats with grapes/banana.  Egg w/ lean turkey breast mid morning.   Satiety much improved on medication.   Plan:   1.  Diet: aiming for 1375-1500kcal/day with 60-75 grams of lean protein daily should  continue excellent nourishment of your weight loss goals this winter.   Continue mindful morning intake to start the day well and stabilize blood sugar well.   2. Exercise: less pain now with walking. I'd recommend using some non weight bearing execise to avoid joint injury this winter: swimming/strength work, pilates/yoga, exercise bicycle or elliptical fitness.  Anything beyond 10 minutes most days of the week improves our body through exercise.   3. Medication: Mounjaro very efficacious in improved blood sugar control. Recheck A1c next week as planned. 10mg/week dose will continue the next 6 months or so and we'll see if dose escalation is needed beyond that. Separate beverages from meals to make sure you get enough nourishment in at meal time and hydrate well between meals to hit your 64-75 oz of water in each day.   4. Down over 5% body weight reduction already, 15 lbs in 3 months. Well done. Medium term goal this winter/early Spring of getting BMI under 40 (currently at 44.3)/weight under 230 lbs.   5. Goals: improved A1c and losing weight enough to put diabetes into remission over the next 2 years if getting weight close to 200 lbs.       We discussed [facility] Basics including:  -eating 3 meals daily  -reviewed metabolic needs for weight loss based on Resting Metabolic Rate  -protein goals supportive of healthy weight loss  -avoiding/limiting calorie containing beverages  -We discussed the importance of restorative sleep and stress management in maintaining a healthy weight.  -We discussed the National Weight Control Registry healthy weight maintenance strategies and ways to optimize metabolism.  -We discussed the importance of physical activity including cardiovascular and strength training in maintaining a healthier weight and explored viable options.      Most recent labs:  Lab Results   Component Value Date    WBC 11.4 (H) 09/01/2023    HGB 15.2 09/01/2023    HCT 42.3 09/01/2023    MCV 88  "09/01/2023     09/01/2023     No results found for: \"CHOL\"  No results found for: \"HDL\"  No components found for: \"LDLCALC\"  No results found for: \"TRIG\"  No results found for: \"CHOLHDL\"  Lab Results   Component Value Date    ALT 38 09/01/2023    AST 42 09/01/2023    ALKPHOS 87 09/01/2023     No results found for: \"HGBA1C\"  Lab Results   Component Value Date    B12 726 09/01/2023     No components found for: \"VITDT1\"  No results found for: \"RAI\"  Lab Results   Component Value Date    PTHI 14 (L) 09/01/2023     No results found for: \"ZN\"  Lab Results   Component Value Date    VIB1WB 315 (H) 09/01/2023     Lab Results   Component Value Date    TSH 1.15 09/01/2023     No results found for: \"TEST\"    DIETARY HISTORY  Tracking technique: regular blood sugar checks  Positive Changes Since Last Visit: less pain walking, improved blood sugars, better breakfasts  Struggling With: not much at this point. No ill effects of Mounjaro    Getting Adequate Protein: yes  Sleep schedule: n/a.      PHYSICAL ACTIVITY PATTERNS:  Cardiovascular: walks minimally, can get up stairs now without pain   Strength Training: limited to chores.    REVIEW OF SYSTEMS  Feels happy about progress. No low blood sugars. Less joint pain..  PHYSICAL EXAM:  Vitals: Ht 1.607 m (5' 3.25\")   Wt 114.3 kg (252 lb)   BMI 44.29 kg/m    Weight:   Wt Readings from Last 3 Encounters:   12/07/23 114.3 kg (252 lb)   09/01/23 121.3 kg (267 lb 8 oz)   04/15/23 124.3 kg (274 lb)         GEN: Pleasant, well groomed, in no acute distress  HEENT:  normal facies .  NECK: No swelling.  HEART: .  LUNGS: No respiratory difficulty noted. No cough. .  ABDOMEN: obese.  EXTREMITIES: No tremor.. seated on couch  NEURO: Alert and Oriented X3, fluent speech. .  SKIN: No visible rashes. .    Interim study results: see above.   TSH   Date Value Ref Range Status   09/01/2023 1.15 0.30 - 4.20 uIU/mL Final     Last Comprehensive Metabolic Panel:  Sodium   Date Value Ref Range " Status   09/01/2023 141 136 - 145 mmol/L Final     Potassium   Date Value Ref Range Status   09/01/2023 4.5 3.4 - 5.3 mmol/L Final   04/11/2023 3.8 3.5 - 5.0 mmol/L Final     Chloride   Date Value Ref Range Status   09/01/2023 104 98 - 107 mmol/L Final   04/11/2023 107 98 - 107 mmol/L Final     Carbon Dioxide (CO2)   Date Value Ref Range Status   09/01/2023 25 22 - 29 mmol/L Final   04/11/2023 24 22 - 31 mmol/L Final     Anion Gap   Date Value Ref Range Status   09/01/2023 12 7 - 15 mmol/L Final   04/11/2023 8 5 - 18 mmol/L Final     Glucose   Date Value Ref Range Status   09/01/2023 100 (H) 70 - 99 mg/dL Final   04/11/2023 141 (H) 70 - 125 mg/dL Final     GLUCOSE BY METER POCT   Date Value Ref Range Status   04/12/2023 120 (H) 70 - 99 mg/dL Final     Urea Nitrogen   Date Value Ref Range Status   09/01/2023 8.6 6.0 - 20.0 mg/dL Final   04/11/2023 10 8 - 22 mg/dL Final     Creatinine   Date Value Ref Range Status   09/01/2023 0.72 0.51 - 0.95 mg/dL Final     GFR Estimate   Date Value Ref Range Status   09/01/2023 >90 >60 mL/min/1.73m2 Final     Calcium   Date Value Ref Range Status   09/01/2023 10.2 (H) 8.6 - 10.0 mg/dL Final     Bilirubin Total   Date Value Ref Range Status   09/01/2023 0.7 <=1.2 mg/dL Final     Alkaline Phosphatase   Date Value Ref Range Status   09/01/2023 87 35 - 104 U/L Final     ALT   Date Value Ref Range Status   09/01/2023 38 0 - 50 U/L Final     Comment:     Reference intervals for this test were updated on 6/12/2023 to more accurately reflect our healthy population. There may be differences in the flagging of prior results with similar values performed with this method. Interpretation of those prior results can be made in the context of the updated reference intervals.       AST   Date Value Ref Range Status   09/01/2023 42 0 - 45 U/L Final     Comment:     Reference intervals for this test were updated on 6/12/2023 to more accurately reflect our healthy population. There may be differences  in the flagging of prior results with similar values performed with this method. Interpretation of those prior results can be made in the context of the updated reference intervals.               Lab Results   Component Value Date    A1C 6.4 09/01/2023    A1C 7.0 04/10/2023     .      30 minutes spent by me on the date of the encounter doing chart review, history and exam, documentation and further activities per the note   Francisco Fine MD  [facility]  2:09 PM  12/7/2023

## 2023-12-07 NOTE — NURSING NOTE
Is the patient currently in the state of [place]? YES    Visit mode:VIDEO    If the visit is dropped, the patient can be reconnected by: VIDEO VISIT: Text to cell phone:   Telephone Information:   Mobile [phone]       Will anyone else be joining the visit? NO  (If patient encounters technical issues they should call [phone]    How would you like to obtain your AVS? MyChart    Are changes needed to the allergy or medication list? No    Reason for visit: RECHECK    Hemant DE LEON

## 2023-12-07 NOTE — PATIENT INSTRUCTIONS
Plan:   1.  Diet: aiming for 1375-1500kcal/day with 60-75 grams of lean protein daily should continue excellent nourishment of your weight loss goals this winter.   Continue mindful morning intake to start the day well and stabilize blood sugar well.   2. Exercise: less pain now with walking. I'd recommend using some non weight bearing execise to avoid joint injury this winter: swimming/strength work, pilates/yoga, exercise bicycle or elliptical fitness.  Anything beyond 10 minutes most days of the week improves our body through exercise.   3. Medication: Mounjaro very efficacious in improved blood sugar control. Recheck A1c next week as planned. 10mg/week dose will continue the next 6 months or so and we'll see if dose escalation is needed beyond that. Separate beverages from meals to make sure you get enough nourishment in at meal time and hydrate well between meals to hit your 64-75 oz of water in each day.   4. Down over 5% body weight reduction already, 15 lbs in 3 months. Well done. Medium term goal this winter/early Spring of getting BMI under 40 (currently at 44.3)/weight under 230 lbs.   5. Goals: improved A1c and losing weight enough to put diabetes into remission over the next 2 years if getting weight close to 200 lbs.         LEAN PROTEIN SOURCES  Getting 20-30 grams of protein, 3 meals daily, is appropriate for most people, some need more but more than about 40 grams per meal is not useful.  General rule is drinking one ounce of water per gram of protein eaten over the course of the day:  70 grams of protein each day, drink 70 oz of water.  Protein Source Portion Calories Grams of Protein                           Nonfat, plain Greek yogurt    (10 grams sugar or less) 3/4 cup (6 oz)  12-17   Light Yogurt (10 grams sugar or less) 3/4 cup (6 oz)  6-8   Protein Shake 1 shake 110-180 15-30   Skim/1% Milk or lactose-free milk 1 cup ( 8 oz)  8   Plain or light, flavored soymilk 1 cup   7-8   Plain or light, hemp milk 1 cup 110 6   Fat Free or 1% Cottage Cheese 1/2 cup 90 15   Part skim ricotta cheese 1/2 cup 100 14   Part skim or reduced fat cheese slices 1 ounce 65-80 8     Mozzarella String Cheese 1 80 8   Canned tuna, chicken, crab or salmon  (canned in water)  1/2 cup 100 15-20   White fish (broiled, grilled, baked) 3 ounces 100 21   [place]/Tuna (broiled, grilled, baked) 3 ounces 150-180 21   Shrimp, Scallops, Lobster, Crab 3 ounces 100 21   Pork loin, Pork Tenderloin 3 ounces 150 21   Boneless, skinless chicken /turkey breast                          (broiled, grilled, baked) 3 ounces 120 21   [place], [place], [place], and Venison 3 ounces 120 21   Lean cuts of red meat and pork (sirloin,   round, tenderloin, flank, ground 93%-96%) 3 ounces 170 21   Lean or Extra Lean Ground Turkey 1/2 cup 150 20   90-95% Lean Barrett Burger 1 joy 140-180 21   Low-fat casserole with lean meat 3/4 cup 200 17   Luncheon Meats                                                        (turkey, lean ham, roast beef, chicken) 3 ounces 100 21   Egg (boiled, poached, scrambled) 1 Egg 60 7   Egg Substitute 1/2 cup 70 10   Nuts (limit to 1 serving per day)  3 Tbsp. 150 7   Nut Weingarten (peanut, almond)  Limit to 1 serving or less daily 1 Tbsp. 90 4   Soy Burger (varies) 1  15   Garbanzo, Black, Rasmussen Beans 1/2 cup 110 7   Refried Beans 1/2 cup 100 7   Kidney and Lima beans 1/2 cup 110 7   Tempeh 3 oz 175 18   Vegan crumbles 1/2 cup 100 14   Tofu 1/2 cup 110 14   Chili (beans and extra lean beef or turkey) 1 cup 200 23   Lentil Stew/Soup 1 cup 150 12   Black Bean Soup 1 cup 175 12       On-the-Go Breakfast Ideas  As of 2015, the latest research shows what a huge impact eating breakfast has on losing weight and feeling your best. People lose more weight when they make breakfast their biggest meal of the day compared to Dinner, but even if you cannot go to that degree, getting a breakfast that has at least 20 grams of  protein and even a moderate amount of fat is ideal for maintaining good energy through the day and limits overeating in the evening hours.  The following are some quick and easy suggestions for at least getting something of substance into your body in the morning.  Enjoy!    Eating breakfast within 90 minutes of waking up is an important part of taking care of your body on a restricted calorie diet plan.  After sleeping for hours, your body is in need of fuel.  An ideal breakfast is a combination of protein, whole-grain carbohydrates, or fruit.  Here s why:    -Protein digests very slowly in the body, helping you feel more satisfied.  -Whole grains provide dietary fiber, which also digests slowly and helps keep your gut clean.  -Fruit is a great source of vitamins, minerals, and fiber.     Each one of these breakfast combinations has between 200-300 calories and 15-20 grams of protein.  Feel free to mix and match!    Bone Broth (chicken bone broth or beef bone broth) is a great way to boost protein content. 8oz of bone broth will typically have 9-12grams of protein for 40kcal of energy.    Protein: Choose  -1/2 cup low-fat cottage cheese  -2 hard boiled eggs , or one cooked in olive oil (low/slow heat).  -1 low fat string cheese stick  -1 [employer] natural peanut butter  -[employer] vegetarian sausage joy (found in freezer section)  -1 slice lowfat cheese  -6 oz 2% or lowfat Greek yogurt, such as Fage or Oikos.    PLUS    Whole Grains:  Choose   -1 whole wheat English muffin  -1 whole wheat jona, half  -1/2 Fiber One frozen muffin, thawed  -1/2 Fiber One toaster pastry  -1 whole wheat bagel thin  -1/2 cup Kashi cereal  -1 Kashi waffle (or other whole grain high-fiber waffle)  Aim for whole grain/sprouted breads with at least 3g of fiber/slice if having bread. [place] is one such brand.    OR    Fruit: Choose  -1/3 cup blueberries  -1/2 banana (or a plantain- similar to a banana, yet smaller)  -1/2 cup  cantaloupe cubes  -1 small apple  -1 small orange  -1/2 cup strawberries  -handful raspberries/blackberries (each berry is about 1 calorie).    *Adapted from [employer], Fall 20    Ten Breakfasts Under 250 calories    Ideally, getting between 350-600 calories  (depending on starting height and weight)for breakfast is ideal for avoiding hunger later in the day, adjust/add to the following accordingly:    One- 250 calories, 8.5 g protein  1 slice whole-grain toast   1 Tbsp peanut butter    banana    Two- 250 calories, 8 g protein    cup nonfat/lowfat yogurt  1/3rd cup diced no-sugar peaches  1/3rd cup cereal (like Special K, Cheerios, or bran flakes)    Three- 250 calories, 25 g protein  1 egg scrambled with 1 oz skim milk    cup shredded cheddar    whole grain English muffin  1 oz [place] hampton  1 tsp margarine spread    Four- 225 calories, 25 g protein  1/2 cup Kashi Go-Lean cereal    cup skim milk mixed with 1 scoop Bariatric Advantage protein powder    cup no-sugar diced pears    Five- 250 calories, 20 g protein    cup oatmeal prepared with skim milk, 1 scoop protein powder, and sugar-free maple syrup    Six- 200 calories, 5 g protein  1 whole grain waffle, toasted  1 tablespoon creamy peanut or almond butter    Seven-  250 calories, 19 g protein  Breakfast sandwich: 1 slice whole grain toast, cut in half.  Add 1 scrambled egg and one slice cheddar  cheese.    Eight-  250 calories, 15 g protein  2 eggs scrambled with 1/3 cup frozen spinach (heat before adding to eggs) and 2 tablespoons low fat cream cheese.    Nine-  150 calories, 15 g protein  2/3rd cup cottage cheese    cup cantaloupe    Ten- 200 calories, 20 g protein  Fruit smoothie made with 4 oz. nonfat Greek yogurt,   cup berries, 1 scoop protein powder, and 4 oz skim milk.    Ten Lunches Under 250 Calories    Aim for lunch to be around 300-400 calories a day when trying to lose weight and get that protein in!    One- 200 calories, 11 g protein  1/3  cup tuna salad made with light harris on 1 slice whole grain bread  1 small peeled apple    Two- 250 calories, 16 g protein  1/3 cup lowfat cottage cheese    cup cooked green beans    small fruit cocktail (in natural juice)    Three- 200 calories, 11 g protein    grilled cheese sandwich on whole grain bread with lowfat cheese  2/3rd cup of tomato soup    Four- 250 calories, 22 g protein  Deli wrap: 1 oz sliced turkey, 1 oz sliced ham, 1 oz sliced chicken rolled up with 1 slice low-fat cheese  1 small orange    Five- 250 calories, 28 g protein  2/3rd cup chili with 1 oz shredded cheese  4 saltine crackers    Six- 250 calories, 22 g protein  1 cup fresh spinach with 2 oz chicken, 1/3rd cup mandarin oranges, and 2 tablespoons sliced almonds with 1 tablespoon  vinaigrette dressing    Seven- 200 calories, 11 g protein  1 Tbsp sugar-free preserves and 1 Tbsp peanut butter on 1 slice whole grain toast    cup nonfat/lowfat Greek yogurt    Eight- 250 calories, 18 g protein  1 small soft-shell chicken taco with 1 oz shredded cheese, lettuce, tomato, salsa, and 1 Tbsp light sour cream    cup black beans    Nine- 225 calories, 13 g protein  2 ounces baked chicken  1/4 cup mashed potatoes    cup green beans    Ten- 200 calories, 21 g protein  Deli jona: 2 oz roast beef or other deli meat with 1 tsp Geo mayonnaise and sliced tomato, onion, and lettuce  1/3rd cup cottage cheese      Ten Dinners Under 300 calories    If you're eating a large breakfast and medium lunch, keep dinner small.  300-400 calories is ideal for most people depending on their caloric needs.    One- 300 calories, 12 g protein  1-inch thick slice of turkey meatloaf    cup baked butternut squash    Two- 200 calories, 9 g protein  Bread-less BLT: 3 slices turkey hampton, sliced tomato, wrapped in a large lettuce leaf    cup peeled fruit    Three- 275 calories, 36 g protein  3 oz roasted chicken    cup cooked broccoli    cup shredded cheddar cheese    cup  unsweetened applesauce    Four- 200 calories, 25 g protein  3 oz baked tilapia  1/3rd cup cooked carrots    cup yogurt    Five- 250 calories, 20 g protein  Grilled ham  n  Swiss: spread 2 tsp ghee or butter on 1 slice of whole grain bread.  Cut bread in half, layer 2 oz deli ham with 1 piece of Swiss cheese and grill until cheese is melted.    cup cooked vegetables    Six- 250 calories, 18 g protein  Vegetarian cheeseburger: 1 [place] cheeseburger topped with lettuce, onion, tomato, and ketchup/mustard    cup sweet potato fries    Seven- 250 calories, 18 g protein  Pork pot roast: 2 oz roasted pork loin, 1/3rd cup roasted carrots,   medium potato, cooked with   cup gravy    Eight- 330 calories, 25 g protein  2 oz meatballs (about 2 small meatballs)    cup spaghetti sauce  1/2 piece toast topped with 1 tsp ghee or butterand topped with garlic powder, toasted in oven    Nine- 250 calories, 16 g protein  Mexican pizza: one 8  corn tortilla topped with 2 oz chicken,   cup salsa, 2 tablespoons black beans, 2 tablespoons shredded cheese.  Bake until cheese is melted.    Ten- 250 calories, 22 g protein  Shrimp stir-layton: 3 oz cooked shrimp, 1/6th onion,   pepper,   cup chopped carrots sautéed in 1 tablespoon olive oil, topped with 2 tablespoons stir layton sauce and a pinch of sesame seeds        150 Calories or Less Snack Ideas   1 hardboiled egg with   cup berries  1 small apple with 1 hardboiled egg  10 almonds with   cup berries  2 clementines with 1 light string cheese  1 light string cheese with   sliced apple  1 light string cheese wrapped in 2 slices of turkey  4 100% whole wheat crackers (e.g. Triscuit) with 1 light string cheese    c. cottage cheese with   cup fruit and 1 Tbsp sunflower seeds     cup cottage cheese with   of an avocado     can tuna fish with 1 cup sliced cucumbers     cup roasted garbanzo beans with paprika and cayenne pepper    baked sweet potato with   cup chili beans or   cup cottage cheese  2 oz.  nitrate free turkey slices with 1 cup carrots  1 container (6 oz) of low sugar (less than 10 grams of sugar) greek yogurt   3 Tablespoons of hummus with 1 cup sliced bell peppers   2 Tablespoons of hummus with 15 baby carrots  4 Tablespoons ranch dip made with plain Greek Yogurt and 3 mini cucumbers  1/4 cup nuts (any kind)  1 Tablespoon peanut butter with 1 stalk celery   1 dill pickle wrapped in 1-2 slices of deli ham with 1 tsp of mayonnaise/mustard.  Example Meal Plan for a 3897-6910 Calorie Diet:    In order to fuel your weight loss properly and avoid hunger-induced overeating later in the day, for your height and weight, you will enjoy the most success by following the diet below or similar with adjustments based on your particular tastes and preferences.  Exercise may influence speed, amount of weight loss further.     I recommend getting into a meal routine and keeping it similar day to day in the beginning so you don t have to think too hard about what you re going to make/eat.  Keep snacks healthy, ideally containing protein and some vegetables.  Non-processed food is preferable to packaged items.  Eat at least a few crunchy green vegetables if having a snack, which should be 2-3 hours after your mealtimes(prepare these ahead of time for ease of use).  Drink 64 oz -80 oz of water daily for most, some of you will need more and we'll discuss it at your visit if that is the case.      When changing our diet,  we can often mistake thirst for hunger or just have some distracted eating habits that we need to break free from ('bored/mindless eating', screen time,work, driving,etc).  A glass of water and reconsideration of our hunger is often all that is needed.  Having the urge is not the problem, but watching it pass by without acting on it is the goal.    If you re having hunger problems, add a protein drink/snack to your morning hours or afternoon snack with at least 20grams of protein and not too much sugar  (under 10g).  A carton of higher protein/low sugar yogurt can work as well.  If the urge to snack is overwhelming and not satiated, try going for a 10 minute walk/exercise, come home and drink a glass of water and if still hungry, have a  calorie snack (handful of raw/sprouted nuts, veggies and string cheese, protein bar, etc).  Savor it.    It is better to have a large breakfast, a moderate lunch and a smaller dinner to fuel your day.  People lose 10-15% more weight during their weight loss season with this strategy. Optimizing your protein intake at each meal will further keep you more satisfied while eating less food overall.  Getting exercise in early has also been shown to offer the best results (before breakfast ideally but anytime is the right time to exercise if that is not an option for you).    To make sure you re getting adequate vitamins and minerals during weight loss, I recommend one complete multivitamin a day of your choice.  Consider a probiotic and taking some vitamin D 2000 IU daily.    Let supper be your last meal of the day and ideally try to have at least 12 hours between supper and breakfast the next day to tap into some beneficial overnight fasting dynamics.  Midnight snacks need to go away. Water in the evening is fine, unsweetened, non caffeinated herbal tea is helpful as well.  Consolidating your meals within a 8-12 hour period of your day will help tap into these additional metabolic benefits and tends to keep your appetite up for breakfast, further helping to stay on track.  For most of my patients, I don't recommend an intermittent fasting style diet (many find it hard to fit in their lifestyle) but an overnight fast is very doable for most patients and helps regulate our hunger drives a little better.  This makes it very important to nail good intake at all three meals to feel satisfied/energized and still lose weight.      If evening snacking desires are high, consider a glass  of fiber supplement for some additional fullness (metamucil or similar). Most of us don't get the 25-30 grams per day of fiber that promotes good gut health/satiety.  Benefiber, metamucil, citrucel are reasonable/affordable options for most people.  Inulin, chicory, psyllium husk are reasonable options but start slow and low in the dose to avoid gas/bloating until your gut gets acclimated (ramping up to 5-10 grams per day of supplemental fiber after 3-4 weeks if needed).      Example Meal Plan:  Breakfast: 450-475 Calories  1 egg cooked on low in olive oil:   calories.  5oz Greek Yogurt (Fage plain classic: ~150 jennifer)  Handful of Berries of your choice (about a calorie per berry or 20-40cal per handful)    cup(cooked) of  old fashioned oatmeal or 1/2 cup(cooked) steel cut oats. (150 jennifer)  Sprinkle amount of brown sugar and a pat of butter. (40 jennifer)  Glass of  Water  Black coffee or unsweetened Tea (0calories).      2-3 hours Later Snack: (195 calories).  Glass of water  One string Cheese (80 calories) or 4 oz creamed cottage cheese (115 calories) with  Crunchy Celery sticks (less than 10 calories per large stalk) 2 stalks. (20 calories)    of a  Large Banana or   of a Large Apple (60 calories):  eat second half at lunch or afternoon snack.     Lunch:300 -350 calories   Chicken Breast  (baked/broiled/roasted/grilled)  4-6 oz.  (125-180 jennifer), BBQ sauce/hot sauce/mustard/seasoning is free. Just use a reasonable amount. Or a can of tuna with 1 tablespoon mayonnaise.  Salad: lettuce, any other veggies (cucumbers, green peppers/celery you like and a small drizzle of dressing to just flavor.  Go as big on the veggies as you like,  as they are practically calorie free.   A whole, 8 inch cucumber is 45 calories, a whole green pepper is 23 calories, a stalk of celery is 9 calories.  [place] Dressing is 60 calories per tablespoon..so moderate your desired dressing or do a drizzle of olive oil and splash of  balsamic vinegar on top,  Total calories unlikely to be over 150 even with dressing.  Glass of Water.    Option for lunch is meal replacement protein drink/smoothie.  Need at least 20 grams of protein and eat the rest of your apple/banana from the morning snack.      Afternoon Snack: 150-200 calories   Cheese Stick or cottage cheese again  and a fresh fruit OR  Granola Bar (protein Bar acceptable if under 200 calories OR  Homemade smoothies:  8oz skim milk,  a handful of berries (fresh or frozen and a serving of protein powder such as BiPro or Lety sWhey for example.  If you don't like dairy, make with 8oz water, one small banana, handful of berries and the protein powder, add any veggies you want as well:  roughly 200 calories.   Glass of Water    Dinner: 325 calories  4oz of fresh, [place] salmon.  Broiled (salt/pepper/dill) for about 8-8.5 minutes (200calories) or  4oz filet mignon steak or sirloin steak  Salad or vegetable sautéed lightly in olive oil or   Broccoli 1.5  cups chopped and steamed  or micro-waved in a little water (75 calories)  Glass of Water,    Cup of herbal tea (unsweetened, caffeine free)      Herbs and seasonings are encouraged to flavor your foods/vegetables.  Make your food delicious.      Tips for Success:  1.  Prepare proteins ahead of time (broil chicken breasts in bulk so you can grab and go), steel cut oats/lentils can be stored in casserole dish/bowl in the fridge for quick scoop in the morning and rewarm in microwave, make use of crock pot recipes (watch salt content).  Making meals that cover 3-4 future meals is an easy way to stay on track.  2.  Drink a 8-12 oz glass of water every 2-3 hours when awake.  We often mistake hunger for thirst, especially when losing weight.  3. Remember your Reward and Motivation when things get hard.  4.  Weigh yourself every morning and record, you'll stay on track better and learn how our biorhythms, diet and elimination patterns show up on the  "scale. Don't worry about 1 or 2 day patterns, but when on track you'll notice good trend downward of weight over 3-4 day segments.  Plateaus tend to resolve after 4-8 days in most cases if you stay consistent with your plan.  These are natural and part of weight loss, even if you're perfect with your plan execution.  5. Call if problems/concerns.  [employer] is a great tool to stay in touch and provide weekly outside accountability. Check in with questions or if you want to brag.  6.  Find a handful of meals/foods that keep you on track and feeling good and get into a routine that is sustainable for you.  It's OK to have a routine that works for you.  7.  Consider taking a complete multivitamin just to make sure all micronutrients are adequate during weight loss.  8. If losing hair/brittle nails it usually means you are not taking enough protein.  Minimum goal is 60 grams daily of protein for smaller women, 80 grams a day for men. Consider taking Biotin as supplement or a \"Hair and Nail\" multivitamin.    "

## 2023-12-30 ENCOUNTER — HEALTH MAINTENANCE LETTER (OUTPATIENT)
Age: 48
End: 2023-12-30

## 2024-02-07 ENCOUNTER — VIRTUAL VISIT (OUTPATIENT)
Dept: SURGERY | Facility: CLINIC | Age: 49
End: 2024-02-07
Payer: COMMERCIAL

## 2024-02-07 DIAGNOSIS — E11.9 CONTROLLED TYPE 2 DIABETES MELLITUS WITHOUT COMPLICATION, WITHOUT LONG-TERM CURRENT USE OF INSULIN (H): ICD-10-CM

## 2024-02-07 DIAGNOSIS — Z71.3 NUTRITIONAL COUNSELING: ICD-10-CM

## 2024-02-07 DIAGNOSIS — E66.01 OBESITY, CLASS III, BMI 40-49.9 (MORBID OBESITY) (H): Primary | ICD-10-CM

## 2024-02-07 DIAGNOSIS — Z76.89 ENCOUNTER FOR WEIGHT MANAGEMENT: ICD-10-CM

## 2024-02-07 PROCEDURE — 97803 MED NUTRITION INDIV SUBSEQ: CPT | Mod: 93

## 2024-02-07 NOTE — LETTER
2/7/2024         RE: Eleanor Elizabeth  [address] [facility] [address]        Dear Colleague,    Thank you for referring your patient, Eleanor Elizabeth, to the [facility] [place]. Please see a copy of my visit note below.    Eleanor Elizabeth is a 48 year old who is being evaluated via a billable video visit.      How would you like to obtain your AVS? MyChart  If the video visit is dropped, the invitation should be resent by: Send to e-mail at: [email or web address]  Will anyone else be joining your video visit? No         Medical  Weight Loss Follow-Up Diet Evaluation  Assessment:  Eleanor is presenting today for a follow up weight management nutrition consultation.  This patient has had an initial appointment and was referred by Dr. Fine for MNT as treatment for Morbid obesity which is impacting her type 2 DM, and pain.      Weight loss medication:  Mounjaro . Metformin   Pt's weight is: 249 lbs   Initial weight: 267 lbs   286 in April 2023  Weight change: 18 lbs down from initial        12/7/2023     1:51 PM   Changes and Difficulties   I have made the following changes to my diet since my last visit: worked on eating breakfast, routine prepping and morning snack; lunches under 500 calories;   With regards to my diet, I am still struggling with: dinners--are a struggle, finding things to incorporate variety   I have made the following changes to my activity/exercise since my last visit: increasing walking, focus on getting exercise in and elliptical workouts...   With regards to my activity/exercise, I am still struggling with: no     BMI: 43.76  Ideal body weight: 54.7 kg (120 lb 9.5 oz)  Adjusted ideal body weight: 81.4 kg (179 lb 5.7 oz)    Estimated RMR ([place]-St Jeor equation):  1740 kcals x 1.2 (sedentary) = 2090 kcals (for weight maintenance)  Recommended Protein Intake:  grams of protein/day  Patient Active Problem List:  Patient  "Active Problem List   Diagnosis     Lumbar spinal stenosis     Diabetes mellitus, type 2 (H)     Diabetes: Yes, type 2 - on Metformin   BS averaging 96  HbA1c:  5.4% (12/11/2023)    Progress on goals from last visit: Patient reports she is doing well nutritionally. A1c has dropped nicely. Has been finding recipes for protein  \"grab and go snacks\". She is very happy with her non scale victories (pant size, energy levels, less pain). Meal planning is going great, fruit, vegetable and fiber intake is great. Continues tracking her calories via tylor aiming for under 1500 kcal daily      Add protein to breakfast and soup when preparing ahead of time - met   Continue with currently goals set in place - met  Get recent weight recorded before seeing Dr. 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 Discussed ways to increase protein at meal time without adjusting food prepared too much.  Nutrition counseling: goat setting, continued encouragement and support.    Monitoring/Evaluation:    Goals:  1,500kcal, 180-190g carbs/day, 90-100g protein daily  Continue with activity goals/walking     Patient to follow up in 2 month(s) with bariatrician and 4 month(s) with RD        Virtual Visit Details    Type of service:  Telephone Visit   Phone call duration: 26 minutes  "     Originating Location (pt. Location): Home      Distant Location (provider location):  Off-site    Mode of Communication:  Video Conference via [facility]    Physician has received verbal consent for a Video Visit from the patient? Yes      Pau Mensah RD           Again, thank you for allowing me to participate in the care of your patient.        Sincerely,        Pau Mensah RD

## 2024-02-07 NOTE — PROGRESS NOTES
Eleanor Elizabeth is a 48 year old who is being evaluated via a billable video visit.      How would you like to obtain your AVS? MyChart  If the video visit is dropped, the invitation should be resent by: Send to e-mail at: [email or web address]  Will anyone else be joining your video visit? No         Medical  Weight Loss Follow-Up Diet Evaluation  Assessment:  Eleanor is presenting today for a follow up weight management nutrition consultation.  This patient has had an initial appointment and was referred by Dr. Fine for MNT as treatment for Morbid obesity which is impacting her type 2 DM, and pain.      Weight loss medication:  Mounjaro . Metformin   Pt's weight is: 249 lbs   Initial weight: 267 lbs   286 in April 2023  Weight change: 18 lbs down from initial        12/7/2023     1:51 PM   Changes and Difficulties   I have made the following changes to my diet since my last visit: worked on eating breakfast, routine prepping and morning snack; lunches under 500 calories;   With regards to my diet, I am still struggling with: dinners--are a struggle, finding things to incorporate variety   I have made the following changes to my activity/exercise since my last visit: increasing walking, focus on getting exercise in and elliptical workouts...   With regards to my activity/exercise, I am still struggling with: no     BMI: 43.76  Ideal body weight: 54.7 kg (120 lb 9.5 oz)  Adjusted ideal body weight: 81.4 kg (179 lb 5.7 oz)    Estimated RMR ([place]-St Jeor equation):  1740 kcals x 1.2 (sedentary) = 2090 kcals (for weight maintenance)  Recommended Protein Intake:  grams of protein/day  Patient Active Problem List:  Patient Active Problem List   Diagnosis    Lumbar spinal stenosis    Diabetes mellitus, type 2 (H)     Diabetes: Yes, type 2 - on Metformin   BS averaging 96  HbA1c:  5.4% (12/11/2023)    Progress on goals from last visit: Patient reports she is doing well nutritionally. A1c has dropped  "nicely. Has been finding recipes for protein  \"grab and go snacks\". She is very happy with her non scale victories (pant size, energy levels, less pain). Meal planning is going great, fruit, vegetable and fiber intake is great. Continues tracking her calories via tylor aiming for under 1500 kcal daily      Add protein to breakfast and soup when preparing ahead of time - met   Continue with currently goals set in place - met  Get recent weight recorded before seeing Dr. 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 Discussed ways to increase protein at meal time without adjusting food prepared too much.  Nutrition counseling: goat setting, continued encouragement and support.    Monitoring/Evaluation:    Goals:  1,500kcal, 180-190g carbs/day, 90-100g protein daily  Continue with activity goals/walking     Patient to follow up in 2 month(s) with bariatrician and 4 month(s) with RD        Virtual Visit Details    Type of service:  Telephone Visit   Phone call duration: 26 minutes      Originating Location (pt. Location): Home      Distant Location (provider location):  Off-site    Mode of Communication:  Video Conference via [employer]    Physician has received verbal consent for a Video Visit from the patient? Yes      Pau Mensah RD       "

## 2024-03-06 ENCOUNTER — TELEPHONE (OUTPATIENT)
Dept: SURGERY | Facility: CLINIC | Age: 49
End: 2024-03-06
Payer: COMMERCIAL

## 2024-03-06 NOTE — TELEPHONE ENCOUNTER
General Call    Contacts         Type Contact Phone/Fax    03/06/2024 02:21 PM CST Phone (Incoming) Eleanor Elizabeth (Self) [phone] (M)          Reason for Call:  Mounjaro    What are your questions or concerns:  pt located Mounjaro 12 mg at [facility] she is wondering if prescription with the increase to 12 mg could be sent over there    Could we send this information to you in [facility] or would you prefer to receive a phone call?:   Patient would prefer a phone call   Okay to leave a detailed message?: Yes at Cell number on file:    Telephone Information:   Mobile [phone]

## 2024-04-10 NOTE — PROGRESS NOTES
Bariatric Clinic Follow-Up Visit:    Eleanor Elizabeth is a 48 year old  female with Body mass index is 44.26 kg/m .  presenting here today for follow-up on non-surgical efforts for weight loss. Original Intake visit occurred on 9/1/23 with a weight of 267 lbs and BMI of 45.9.  Along with diet and behavior changes, she has been using tirzepatide to assist her weight loss goals ramping to efficacy/tolerance for both diabetes and morbid obesity benefits..  See her intake visit notes for details on identified contributors to weight gain in the past. Chart review shows Dietician calculated RMR of 1830kcal/day and protein intake goal of 70-90g/day..   Weight:   Wt Readings from Last 5 Encounters:   12/07/23 114.3 kg (252 lb)   09/01/23 121.3 kg (267 lb 8 oz)   04/15/23 124.3 kg (274 lb)   04/10/23 124.3 kg (274 lb)    pounds  Lab Results   Component Value Date    A1C 6.4 09/01/2023    A1C 7.0 04/10/2023     Was  at 238 lbs on 2/21/24 at her PCP office weight.     Comorbidities:  Patient Active Problem List   Diagnosis    Lumbar spinal stenosis    Diabetes mellitus, type 2 (H)    BV (bacterial vaginosis)    Hereditary and idiopathic peripheral neuropathy    Pulmonary embolism (H)       Current Outpatient Medications:     aspirin (ASA) 81 MG EC tablet, Take 1 tablet (81 mg) by mouth daily, Disp: 30 tablet, Rfl: 0    Blood Glucose Monitoring Suppl (CONTOUR NEXT ONE) KIT, 1 DEVICE BY NOT IN USE ROUTE . FOR 1 DOSE. USE AS DIRECTED., Disp: , Rfl:     cholecalciferol (VITAMIN D3) 10 mcg (400 units) TABS tablet, Take 10 mcg by mouth daily, Disp: , Rfl:     CONTOUR NEXT TEST test strip, Use to test bg 1 time daily. Use as directed., Disp: , Rfl:     lisinopril (ZESTRIL) 10 MG tablet, Take 1 tablet (10 mg) by mouth daily, Disp: 30 tablet, Rfl: 1    metFORMIN (GLUCOPHAGE XR) 500 MG 24 hr tablet, Take 2 tablets (1,000 mg) by mouth daily (with dinner), Disp: 60 tablet, Rfl: 0    Microlet Lancets MISC, USE 1 EACH TO TEST DAILY.,  Disp: , Rfl:     Multiple Vitamins-Iron (MULTIPLE VITAMIN/IRON OR), Take 1 tablet by mouth daily, Disp: , Rfl:     norethindrone (MICRONOR) 0.35 MG tablet, Take 0.35 mg by mouth At Bedtime, Disp: , Rfl:     rosuvastatin (CRESTOR) 10 MG tablet, Take 10 mg by mouth daily, Disp: , Rfl:     tirzepatide (MOUNJARO) 10 MG/0.5ML pen, Inject 10 mg Subcutaneous every 7 days, Disp: 2 mL, Rfl: 6    tirzepatide (MOUNJARO) 12.5 MG/0.5ML pen, Inject 12.5 mg Subcutaneous every 7 days for 28 days, Disp: 2 mL, Rfl: 0    [START ON 5/9/2024] tirzepatide (MOUNJARO) 15 MG/0.5ML pen, Inject 15 mg Subcutaneous every 7 days for 196 days, Disp: 2 mL, Rfl: 6    acetaminophen (TYLENOL) 325 MG tablet, Take 2 tablets (650 mg) by mouth every 4 hours as needed for other (For optimal non-opioid multimodal pain management to improve pain control.) (Patient not taking: Reported on 12/7/2023), Disp: 100 tablet, Rfl: 0    gabapentin (NEURONTIN) 300 MG capsule, Take 300 mg by mouth 3 times daily (Patient not taking: Reported on 9/1/2023), Disp: , Rfl:     hydrOXYzine (ATARAX) 25 MG tablet, Take 1 tablet (25 mg) by mouth every 6 hours as needed for other (adjuvant pain) (Patient not taking: Reported on 9/1/2023), Disp: 30 tablet, Rfl: 0    tiZANidine (ZANAFLEX) 2 MG tablet, Take 1-2 tablets (2-4 mg) by mouth 3 times daily as needed for muscle spasms (Patient not taking: Reported on 9/1/2023), Disp: 20 tablet, Rfl: 0      Interim: Since our last visit, she has been doing well, A1c much improved:   Lab Results   Component Value Date    A1C 6.4 09/01/2023    A1C 7.0 04/10/2023   [facility] shows A1c of 5.4% on 12/11/223, excellent response.  AM fasting glucose always under 100mg/dL since starting Mounjaro. Today was 92mg/dL.     Some supply issues with her 10mg/week dose and we discussed, given dual benefits on her morbid obesity and her diabetes, to increase/ramp to 12.5mg/week then 15mg/week dosing with plans to stay at that top  dose if tolerated this year.     She's maintaining about a 30 lb or more decrease at least at her last PCP clinic visit in February, she doesn't own a scale.   No upset stomach/vomiting/constipation issues. Feels good. Has worked up to 5minutes/day on her elliptical after having avoided exercise after her spine surgery last year. Discussed use of distraction and enetertaining and the concept of base phase development this month of April to help her recondition.     Plan:   1.  Diet: continue protein first apprach and as Mounjaro goes up, you may find one or two softer proteins daily is required to meat your intake goal of 70-80grams of lean protein daily. Hydrate well between meals and avoid drinking with meals as it can fill you up and compromise how much you get in at each meal.   Aim for  oz of water daily, just not at meal time.    2. Exercise: continue elliptical, you though putting in front of TV would help and I agree. Aim to increase by 60-90 seconds each week.    3. Medication: Mounjaro can ramp to 12.5mg/week for 4 weeks then increase to 15mg/week if tolerated. A1c was trending great and we'll see if getting BMI under 35 this year is possible, a weight where your diabetes is likely to go into remission.     4. Women's one a day vitamin  a few days weekly is recommended while on Mounjaro in case nutrition is too restricted to get all that you need. Equate 50 plus vitamin would be fine (no iron).     5. Goals: 1-2 lbs weekly of weight loss. You're down over 30 lbs thus far if weight is under 236 lbs. It may be good to  a scale this year.      We discussed [facility] including:  -eating 3 meals daily  -reviewed metabolic needs for weight loss based on Resting Metabolic Rate  -protein goals supportive of healthy weight loss  -avoiding/limiting calorie containing beverages  -We discussed the importance of restorative sleep and stress management in maintaining a healthy weight.  -We  "discussed the National Weight Control Registry healthy weight maintenance strategies and ways to optimize metabolism.  -We discussed the importance of physical activity including cardiovascular and strength training in maintaining a healthier weight and explored viable options.      Most recent labs:  Lab Results   Component Value Date    WBC 11.4 (H) 09/01/2023    HGB 15.2 09/01/2023    HCT 42.3 09/01/2023    MCV 88 09/01/2023     09/01/2023     No results found for: \"CHOL\"  No results found for: \"HDL\"  No components found for: \"LDLCALC\"  No results found for: \"TRIG\"  No results found for: \"CHOLHDL\"  Lab Results   Component Value Date    ALT 38 09/01/2023    AST 42 09/01/2023    ALKPHOS 87 09/01/2023     No results found for: \"HGBA1C\"  Lab Results   Component Value Date    B12 726 09/01/2023     No components found for: \"VITDT1\"  No results found for: \"RAI\"  Lab Results   Component Value Date    PTHI 14 (L) 09/01/2023     No results found for: \"ZN\"  Lab Results   Component Value Date    VIB1WB 315 (H) 09/01/2023     Lab Results   Component Value Date    TSH 1.15 09/01/2023     No results found for: \"TEST\"    DIETARY HISTORY  Tracking technique: blood glucose is excellently tracked daily.  Positive Changes Since Last Visit: on ellipitcal for first time since spine surgery.  Struggling With: not much. No upset stomach issues. Time is tight with work/children care    Getting Adequate Protein: yes  Sleep schedule: n/a.      PHYSICAL ACTIVITY PATTERNS:  Cardiovascular: walks/elliptical  Strength Training: limited to chores    REVIEW OF SYSTEMS  Feels well. No rash/constipation/abdominal pain. Blood sugars controlled for the first time in her recent life..  PHYSICAL EXAM:  Vitals: Ht 1.607 m (5' 3.27\")   BMI 44.26 kg/m    Weight:   Wt Readings from Last 3 Encounters:   12/07/23 114.3 kg (252 lb)   09/01/23 121.3 kg (267 lb 8 oz)   04/15/23 124.3 kg (274 lb)         GEN: Pleasant, well groomed, in no acute " distress  HEENT:  normal faces .  NECK: No swelling.  HEART: .  LUNGS: No respiratory difficulty noted. No cough. .  ABDOMEN: .  EXTREMITIES: No tremor. ..  NEURO: Alert and Oriented X3, fluent speech. .  SKIN: No visible rashes. .    Interim study results: see above..      30 minutes spent by me on the date of the encounter doing chart review, history and exam, documentation and further activities per the note   Francisco Fine MD  [facility]  4:29 PM  4/10/2024

## 2024-04-11 ENCOUNTER — VIRTUAL VISIT (OUTPATIENT)
Dept: SURGERY | Facility: CLINIC | Age: 49
End: 2024-04-11
Payer: COMMERCIAL

## 2024-04-11 VITALS — BODY MASS INDEX: 44.26 KG/M2 | HEIGHT: 63 IN

## 2024-04-11 DIAGNOSIS — E66.01 CLASS 3 SEVERE OBESITY DUE TO EXCESS CALORIES WITH SERIOUS COMORBIDITY AND BODY MASS INDEX (BMI) OF 45.0 TO 49.9 IN ADULT (H): Primary | ICD-10-CM

## 2024-04-11 DIAGNOSIS — M48.061 SPINAL STENOSIS OF LUMBAR REGION, UNSPECIFIED WHETHER NEUROGENIC CLAUDICATION PRESENT: ICD-10-CM

## 2024-04-11 DIAGNOSIS — E11.9 CONTROLLED TYPE 2 DIABETES MELLITUS WITHOUT COMPLICATION, WITHOUT LONG-TERM CURRENT USE OF INSULIN (H): ICD-10-CM

## 2024-04-11 DIAGNOSIS — E66.813 CLASS 3 DRUG-INDUCED OBESITY WITH SERIOUS COMORBIDITY AND BODY MASS INDEX (BMI) OF 40.0 TO 44.9 IN ADULT (H): ICD-10-CM

## 2024-04-11 DIAGNOSIS — E66.1 CLASS 3 DRUG-INDUCED OBESITY WITH SERIOUS COMORBIDITY AND BODY MASS INDEX (BMI) OF 40.0 TO 44.9 IN ADULT (H): ICD-10-CM

## 2024-04-11 DIAGNOSIS — E11.9 TYPE 2 DIABETES, HBA1C GOAL < 7% (H): ICD-10-CM

## 2024-04-11 DIAGNOSIS — E66.813 CLASS 3 SEVERE OBESITY DUE TO EXCESS CALORIES WITH SERIOUS COMORBIDITY AND BODY MASS INDEX (BMI) OF 45.0 TO 49.9 IN ADULT (H): Primary | ICD-10-CM

## 2024-04-11 PROCEDURE — 99214 OFFICE O/P EST MOD 30 MIN: CPT | Mod: 95 | Performed by: EMERGENCY MEDICINE

## 2024-04-11 RX ORDER — TIRZEPATIDE 12.5 MG/.5ML
12.5 INJECTION, SOLUTION SUBCUTANEOUS
Qty: 2 ML | Refills: 0 | Status: SHIPPED | OUTPATIENT
Start: 2024-04-11 | End: 2024-05-21

## 2024-04-11 ASSESSMENT — PAIN SCALES - GENERAL: PAINLEVEL: NO PAIN (0)

## 2024-04-11 NOTE — LETTER
4/11/2024         RE: Eleanor Elizabeth  [address] [facility] [address]        Dear Colleague,    Thank you for referring your patient, Eleanor Elizabeth, to the [facility] [place]. Please see a copy of my visit note below.    Bariatric Clinic Follow-Up Visit:    Eleanor Elizabeth is a 48 year old  female with Body mass index is 44.26 kg/m .  presenting here today for follow-up on non-surgical efforts for weight loss. Original Intake visit occurred on 9/1/23 with a weight of 267 lbs and BMI of 45.9.  Along with diet and behavior changes, she has been using tirzepatide to assist her weight loss goals ramping to efficacy/tolerance for both diabetes and morbid obesity benefits..  See her intake visit notes for details on identified contributors to weight gain in the past. Chart review shows Dietician calculated RMR of 1830kcal/day and protein intake goal of 70-90g/day..   Weight:   Wt Readings from Last 5 Encounters:   12/07/23 114.3 kg (252 lb)   09/01/23 121.3 kg (267 lb 8 oz)   04/15/23 124.3 kg (274 lb)   04/10/23 124.3 kg (274 lb)    pounds  Lab Results   Component Value Date    A1C 6.4 09/01/2023    A1C 7.0 04/10/2023     Was  at 238 lbs on 2/21/24 at her PCP office weight.     Comorbidities:  Patient Active Problem List   Diagnosis     Lumbar spinal stenosis     Diabetes mellitus, type 2 (H)     BV (bacterial vaginosis)     Hereditary and idiopathic peripheral neuropathy     Pulmonary embolism (H)       Current Outpatient Medications:      aspirin (ASA) 81 MG EC tablet, Take 1 tablet (81 mg) by mouth daily, Disp: 30 tablet, Rfl: 0     Blood Glucose Monitoring Suppl (CONTOUR NEXT ONE) KIT, 1 DEVICE BY NOT IN USE ROUTE . FOR 1 DOSE. USE AS DIRECTED., Disp: , Rfl:      cholecalciferol (VITAMIN D3) 10 mcg (400 units) TABS tablet, Take 10 mcg by mouth daily, Disp: , Rfl:      CONTOUR NEXT TEST test strip, Use to test bg 1 time daily. Use as directed., Disp: ,  Rfl:      lisinopril (ZESTRIL) 10 MG tablet, Take 1 tablet (10 mg) by mouth daily, Disp: 30 tablet, Rfl: 1     metFORMIN (GLUCOPHAGE XR) 500 MG 24 hr tablet, Take 2 tablets (1,000 mg) by mouth daily (with dinner), Disp: 60 tablet, Rfl: 0     Microlet Lancets MISC, USE 1 EACH TO TEST DAILY., Disp: , Rfl:      Multiple Vitamins-Iron (MULTIPLE VITAMIN/IRON OR), Take 1 tablet by mouth daily, Disp: , Rfl:      norethindrone (MICRONOR) 0.35 MG tablet, Take 0.35 mg by mouth At Bedtime, Disp: , Rfl:      rosuvastatin (CRESTOR) 10 MG tablet, Take 10 mg by mouth daily, Disp: , Rfl:      tirzepatide (MOUNJARO) 10 MG/0.5ML pen, Inject 10 mg Subcutaneous every 7 days, Disp: 2 mL, Rfl: 6     tirzepatide (MOUNJARO) 12.5 MG/0.5ML pen, Inject 12.5 mg Subcutaneous every 7 days for 28 days, Disp: 2 mL, Rfl: 0     [START ON 5/9/2024] tirzepatide (MOUNJARO) 15 MG/0.5ML pen, Inject 15 mg Subcutaneous every 7 days for 196 days, Disp: 2 mL, Rfl: 6     acetaminophen (TYLENOL) 325 MG tablet, Take 2 tablets (650 mg) by mouth every 4 hours as needed for other (For optimal non-opioid multimodal pain management to improve pain control.) (Patient not taking: Reported on 12/7/2023), Disp: 100 tablet, Rfl: 0     gabapentin (NEURONTIN) 300 MG capsule, Take 300 mg by mouth 3 times daily (Patient not taking: Reported on 9/1/2023), Disp: , Rfl:      hydrOXYzine (ATARAX) 25 MG tablet, Take 1 tablet (25 mg) by mouth every 6 hours as needed for other (adjuvant pain) (Patient not taking: Reported on 9/1/2023), Disp: 30 tablet, Rfl: 0     tiZANidine (ZANAFLEX) 2 MG tablet, Take 1-2 tablets (2-4 mg) by mouth 3 times daily as needed for muscle spasms (Patient not taking: Reported on 9/1/2023), Disp: 20 tablet, Rfl: 0      Interim: Since our last visit, she has been doing well, A1c much improved:   Lab Results   Component Value Date    A1C 6.4 09/01/2023    A1C 7.0 04/10/2023   [facility] shows A1c of 5.4% on 12/11/223, excellent  response.  AM fasting glucose always under 100mg/dL since starting Mounjaro. Today was 92mg/dL.     Some supply issues with her 10mg/week dose and we discussed, given dual benefits on her morbid obesity and her diabetes, to increase/ramp to 12.5mg/week then 15mg/week dosing with plans to stay at that top dose if tolerated this year.     She's maintaining about a 30 lb or more decrease at least at her last PCP clinic visit in February, she doesn't own a scale.   No upset stomach/vomiting/constipation issues. Feels good. Has worked up to 5minutes/day on her elliptical after having avoided exercise after her spine surgery last year. Discussed use of distraction and enetertaining and the concept of base phase development this month of April to help her recondition.     Plan:   1.  Diet: continue protein first apprach and as Mounjaro goes up, you may find one or two softer proteins daily is required to meat your intake goal of 70-80grams of lean protein daily. Hydrate well between meals and avoid drinking with meals as it can fill you up and compromise how much you get in at each meal.   Aim for  oz of water daily, just not at meal time.    2. Exercise: continue elliptical, you though putting in front of TV would help and I agree. Aim to increase by 60-90 seconds each week.    3. Medication: Mounjaro can ramp to 12.5mg/week for 4 weeks then increase to 15mg/week if tolerated. A1c was trending great and we'll see if getting BMI under 35 this year is possible, a weight where your diabetes is likely to go into remission.     4. Women's one a day vitamin  a few days weekly is recommended while on Mounjaro in case nutrition is too restricted to get all that you need. Equate 50 plus vitamin would be fine (no iron).     5. Goals: 1-2 lbs weekly of weight loss. You're down over 30 lbs thus far if weight is under 236 lbs. It may be good to  a scale this year.      We discussed [facility]  "including:  -eating 3 meals daily  -reviewed metabolic needs for weight loss based on Resting Metabolic Rate  -protein goals supportive of healthy weight loss  -avoiding/limiting calorie containing beverages  -We discussed the importance of restorative sleep and stress management in maintaining a healthy weight.  -We discussed the National Weight Control Registry healthy weight maintenance strategies and ways to optimize metabolism.  -We discussed the importance of physical activity including cardiovascular and strength training in maintaining a healthier weight and explored viable options.      Most recent labs:  Lab Results   Component Value Date    WBC 11.4 (H) 09/01/2023    HGB 15.2 09/01/2023    HCT 42.3 09/01/2023    MCV 88 09/01/2023     09/01/2023     No results found for: \"CHOL\"  No results found for: \"HDL\"  No components found for: \"LDLCALC\"  No results found for: \"TRIG\"  No results found for: \"CHOLHDL\"  Lab Results   Component Value Date    ALT 38 09/01/2023    AST 42 09/01/2023    ALKPHOS 87 09/01/2023     No results found for: \"HGBA1C\"  Lab Results   Component Value Date    B12 726 09/01/2023     No components found for: \"VITDT1\"  No results found for: \"RAI\"  Lab Results   Component Value Date    PTHI 14 (L) 09/01/2023     No results found for: \"ZN\"  Lab Results   Component Value Date    VIB1WB 315 (H) 09/01/2023     Lab Results   Component Value Date    TSH 1.15 09/01/2023     No results found for: \"TEST\"    DIETARY HISTORY  Tracking technique: blood glucose is excellently tracked daily.  Positive Changes Since Last Visit: on ellipitcal for first time since spine surgery.  Struggling With: not much. No upset stomach issues. Time is tight with work/children care    Getting Adequate Protein: yes  Sleep schedule: n/a.      PHYSICAL ACTIVITY PATTERNS:  Cardiovascular: walks/elliptical  Strength Training: limited to chores    REVIEW OF SYSTEMS  Feels well. No rash/constipation/abdominal pain. Blood " "sugars controlled for the first time in her recent life..  PHYSICAL EXAM:  Vitals: Ht 1.607 m (5' 3.27\")   BMI 44.26 kg/m    Weight:   Wt Readings from Last 3 Encounters:   12/07/23 114.3 kg (252 lb)   09/01/23 121.3 kg (267 lb 8 oz)   04/15/23 124.3 kg (274 lb)         GEN: Pleasant, well groomed, in no acute distress  HEENT:  normal faces .  NECK: No swelling.  HEART: .  LUNGS: No respiratory difficulty noted. No cough. .  ABDOMEN: .  EXTREMITIES: No tremor. ..  NEURO: Alert and Oriented X3, fluent speech. .  SKIN: No visible rashes. .    Interim study results: see above..      30 minutes spent by me on the date of the encounter doing chart review, history and exam, documentation and further activities per the note   Francisco Fine MD  [facility]  4:29 PM  4/10/2024    Virtual Visit Details    Type of service:  Video Visit     Originating Location (pt. Location): Home    Distant Location (provider location):  On-site  Platform used for Video Visit: [place]      Again, thank you for allowing me to participate in the care of your patient.        Sincerely,        Francisco Fine MD  "

## 2024-04-11 NOTE — PROGRESS NOTES
Virtual Visit Details    Type of service:  Video Visit     Originating Location (pt. Location): Home    Distant Location (provider location):  On-site  Platform used for Video Visit: [place]

## 2024-04-11 NOTE — PATIENT INSTRUCTIONS
Plan:   1.  Diet: continue protein first apprach and as Mounjaro goes up, you may find one or two softer proteins daily is required to meat your intake goal of 70-80grams of lean protein daily. Hydrate well between meals and avoid drinking with meals as it can fill you up and compromise how much you get in at each meal.   Aim for  oz of water daily, just not at meal time.    2. Exercise: continue elliptical, you though putting in front of TV would help and I agree. Aim to increase by 60-90 seconds each week.    3. Medication: Mounjaro can ramp to 12.5mg/week for 4 weeks then increase to 15mg/week if tolerated. A1c was trending great and we'll see if getting BMI under 35 this year is possible, a weight where your diabetes is likely to go into remission.     4. Women's one a day vitamin  a few days weekly is recommended while on Mounjaro in case nutrition is too restricted to get all that you need. Equate 50 plus vitamin would be fine (no iron).     5. Goals: 1-2 lbs weekly of weight loss. You're down over 30 lbs thus far if weight is under 236 lbs. It may be good to  a scale this year.        LEAN PROTEIN SOURCES  Getting 20-30 grams of protein, 3 meals daily, is appropriate for most people, some need more but more than about 40 grams per meal is not useful.  General rule is drinking one ounce of water per gram of protein eaten over the course of the day:  70 grams of protein each day, drink 70 oz of water.  Protein Source Portion Calories Grams of Protein                           Nonfat, plain Greek yogurt    (10 grams sugar or less) 3/4 cup (6 oz)  12-17   Light Yogurt (10 grams sugar or less) 3/4 cup (6 oz)  6-8   Protein Shake 1 shake 110-180 15-30   Skim/1% Milk or lactose-free milk 1 cup ( 8 oz)  8   Plain or light, flavored soymilk 1 cup  7-8   Plain or light, hemp milk 1 cup 110 6   Fat Free or 1% Cottage Cheese 1/2 cup 90 15   Part skim ricotta cheese 1/2 cup 100 14    Part skim or reduced fat cheese slices 1 ounce 65-80 8     Mozzarella String Cheese 1 80 8   Canned tuna, chicken, crab or salmon  (canned in water)  1/2 cup 100 15-20   White fish (broiled, grilled, baked) 3 ounces 100 21   [place]/Tuna (broiled, grilled, baked) 3 ounces 150-180 21   Shrimp, Scallops, Lobster, Crab 3 ounces 100 21   Pork loin, Pork Tenderloin 3 ounces 150 21   Boneless, skinless chicken /turkey breast                          (broiled, grilled, baked) 3 ounces 120 21   [place], [place], [place], and Venison 3 ounces 120 21   Lean cuts of red meat and pork (sirloin,   round, tenderloin, flank, ground 93%-96%) 3 ounces 170 21   Lean or Extra Lean Ground Turkey 1/2 cup 150 20   90-95% Lean [place] Burger 1 joy 140-180 21   Low-fat casserole with lean meat 3/4 cup 200 17   Luncheon Meats                                                        (turkey, lean ham, roast beef, chicken) 3 ounces 100 21   Egg (boiled, poached, scrambled) 1 Egg 60 7   Egg Substitute 1/2 cup 70 10   Nuts (limit to 1 serving per day)  3 Tbsp. 150 7   Nut Man (peanut, almond)  Limit to 1 serving or less daily 1 Tbsp. 90 4   Soy Burger (varies) 1  15   Garbanzo, Black, [place] Beans 1/2 cup 110 7   Refried Beans 1/2 cup 100 7   Kidney and Lima beans 1/2 cup 110 7   Tempeh 3 oz 175 18   Vegan crumbles 1/2 cup 100 14   Tofu 1/2 cup 110 14   Chili (beans and extra lean beef or turkey) 1 cup 200 23   Lentil Stew/Soup 1 cup 150 12   Black Bean Soup 1 cup 175 12         Example Meal Plan for a 4029-6547 Calorie Diet:    In order to fuel your weight loss properly and avoid hunger-induced overeating later in the day, for your height and weight, you will enjoy the most success by following the diet below or similar with adjustments based on your particular tastes and preferences.  Exercise may influence speed, amount of weight loss further.     I recommend getting into a meal routine and keeping it similar day to day in the beginning  so you don t have to think too hard about what you re going to make/eat.  Keep snacks healthy, ideally containing protein and some vegetables.  Non-processed food is preferable to packaged items.  Eat at least a few crunchy green vegetables if having a snack, which should be 2-3 hours after your mealtimes(prepare these ahead of time for ease of use).  Drink 64 oz -80 oz of water daily for most, some of you will need more and we'll discuss it at your visit if that is the case.      When changing our diet,  we can often mistake thirst for hunger or just have some distracted eating habits that we need to break free from ('bored/mindless eating', screen time,work, driving,etc).  A glass of water and reconsideration of our hunger is often all that is needed.  Having the urge is not the problem, but watching it pass by without acting on it is the goal.    If you re having hunger problems, add a protein drink/snack to your morning hours or afternoon snack with at least 20grams of protein and not too much sugar (under 10g).  A carton of higher protein/low sugar yogurt can work as well.  If the urge to snack is overwhelming and not satiated, try going for a 10 minute walk/exercise, come home and drink a glass of water and if still hungry, have a  calorie snack (handful of raw/sprouted nuts, veggies and string cheese, protein bar, etc).  Savor it.    It is better to have a large breakfast, a moderate lunch and a smaller dinner to fuel your day.  People lose 10-15% more weight during their weight loss season with this strategy. Optimizing your protein intake at each meal will further keep you more satisfied while eating less food overall.  Getting exercise in early has also been shown to offer the best results (before breakfast ideally but anytime is the right time to exercise if that is not an option for you).    To make sure you re getting adequate vitamins and minerals during weight loss, I recommend one complete  multivitamin a day of your choice.  Consider a probiotic and taking some vitamin D 2000 IU daily.    Let supper be your last meal of the day and ideally try to have at least 12 hours between supper and breakfast the next day to tap into some beneficial overnight fasting dynamics.  Midnight snacks need to go away. Water in the evening is fine, unsweetened, non caffeinated herbal tea is helpful as well.  Consolidating your meals within a 8-12 hour period of your day will help tap into these additional metabolic benefits and tends to keep your appetite up for breakfast, further helping to stay on track.  For most of my patients, I don't recommend an intermittent fasting style diet (many find it hard to fit in their lifestyle) but an overnight fast is very doable for most patients and helps regulate our hunger drives a little better.  This makes it very important to nail good intake at all three meals to feel satisfied/energized and still lose weight.      If evening snacking desires are high, consider a glass of fiber supplement for some additional fullness (metamucil or similar). Most of us don't get the 25-30 grams per day of fiber that promotes good gut health/satiety.  Benefiber, metamucil, citrucel are reasonable/affordable options for most people.  Inulin, chicory, psyllium husk are reasonable options but start slow and low in the dose to avoid gas/bloating until your gut gets acclimated (ramping up to 5-10 grams per day of supplemental fiber after 3-4 weeks if needed).      Example Meal Plan:  Breakfast: 450-475 Calories  1 egg cooked on low in olive oil:   calories.  5oz Greek Yogurt (Fage plain classic: ~150 jennifer)  Handful of Berries of your choice (about a calorie per berry or 20-40cal per handful)    cup(cooked) of  old fashioned oatmeal or 1/2 cup(cooked) steel cut oats. (150 jennifer)  Sprinkle amount of brown sugar and a pat of butter. (40 jennifer)  Glass of  Water  Black coffee or unsweetened Tea  (0calories).      2-3 hours Later Snack: (195 calories).  Glass of water  One string Cheese (80 calories) or 4 oz creamed cottage cheese (115 calories) with  Crunchy Celery sticks (less than 10 calories per large stalk) 2 stalks. (20 calories)    of a  Large Banana or   of a Large Apple (60 calories):  eat second half at lunch or afternoon snack.     Lunch:300 -350 calories   Chicken Breast  (baked/broiled/roasted/grilled)  4-6 oz.  (125-180 jennifer), BBQ sauce/hot sauce/mustard/seasoning is free. Just use a reasonable amount. Or a can of tuna with 1 tablespoon mayonnaise.  Salad: lettuce, any other veggies (cucumbers, green peppers/celery you like and a small drizzle of dressing to just flavor.  Go as big on the veggies as you like,  as they are practically calorie free.   A whole, 8 inch cucumber is 45 calories, a whole green pepper is 23 calories, a stalk of celery is 9 calories.  [place] Dressing is 60 calories per tablespoon..so moderate your desired dressing or do a drizzle of olive oil and splash of balsamic vinegar on top,  Total calories unlikely to be over 150 even with dressing.  Glass of Water.    Option for lunch is meal replacement protein drink/smoothie.  Need at least 20 grams of protein and eat the rest of your apple/banana from the morning snack.      Afternoon Snack: 150-200 calories   Cheese Stick or cottage cheese again  and a fresh fruit OR  Granola Bar (protein Bar acceptable if under 200 calories OR  Homemade smoothies:  8oz skim milk,  a handful of berries (fresh or frozen and a serving of protein powder such as BiPro or Lety sWhey for example.  If you don't like dairy, make with 8oz water, one small banana, handful of berries and the protein powder, add any veggies you want as well:  roughly 200 calories.   Glass of Water    Dinner: 325 calories  4oz of fresh, [place] salmon.  Broiled (salt/pepper/dill) for about 8-8.5 minutes (200calories) or  4oz filet mignon steak or sirloin  steak  Salad or vegetable sautéed lightly in olive oil or   Broccoli 1.5  cups chopped and steamed  or micro-waved in a little water (75 calories)  Glass of Water,    Cup of herbal tea (unsweetened, caffeine free)      Herbs and seasonings are encouraged to flavor your foods/vegetables.  Make your food delicious.      Tips for Success:  1.  Prepare proteins ahead of time (broil chicken breasts in bulk so you can grab and go), steel cut oats/lentils can be stored in casserole dish/bowl in the fridge for quick scoop in the morning and rewarm in microwave, make use of crock pot recipes (watch salt content).  Making meals that cover 3-4 future meals is an easy way to stay on track.  2.  Drink a 8-12 oz glass of water every 2-3 hours when awake.  We often mistake hunger for thirst, especially when losing weight.  3. Remember your Reward and Motivation when things get hard.  4.  Weigh yourself every morning and record, you'll stay on track better and learn how our biorhythms, diet and elimination patterns show up on the scale. Don't worry about 1 or 2 day patterns, but when on track you'll notice good trend downward of weight over 3-4 day segments.  Plateaus tend to resolve after 4-8 days in most cases if you stay consistent with your plan.  These are natural and part of weight loss, even if you're perfect with your plan execution.  5. Call if problems/concerns.  [email or web address] is a great tool to stay in touch and provide weekly outside accountability. Check in with questions or if you want to brag.  6.  Find a handful of meals/foods that keep you on track and feeling good and get into a routine that is sustainable for you.  It's OK to have a routine that works for you.  7.  Consider taking a complete multivitamin just to make sure all micronutrients are adequate during weight loss.  8. If losing hair/brittle nails it usually means you are not taking enough protein.  Minimum goal is 60 grams daily of protein for smaller women,  "80 grams a day for men. Consider taking Biotin as supplement or a \"Hair and Nail\" multivitamin.  On-the-Go Breakfast Ideas  As of 2015, the latest research shows what a huge impact eating breakfast has on losing weight and feeling your best. People lose more weight when they make breakfast their biggest meal of the day compared to Dinner, but even if you cannot go to that degree, getting a breakfast that has at least 20 grams of protein and even a moderate amount of fat is ideal for maintaining good energy through the day and limits overeating in the evening hours.  The following are some quick and easy suggestions for at least getting something of substance into your body in the morning.  Enjoy!    Eating breakfast within 90 minutes of waking up is an important part of taking care of your body on a restricted calorie diet plan.  After sleeping for hours, your body is in need of fuel.  An ideal breakfast is a combination of protein, whole-grain carbohydrates, or fruit.  Here s why:    -Protein digests very slowly in the body, helping you feel more satisfied.  -Whole grains provide dietary fiber, which also digests slowly and helps keep your gut clean.  -Fruit is a great source of vitamins, minerals, and fiber.     Each one of these breakfast combinations has between 200-300 calories and 15-20 grams of protein.  Feel free to mix and match!    Bone Broth (chicken bone broth or beef bone broth) is a great way to boost protein content. 8oz of bone broth will typically have 9-12grams of protein for 40kcal of energy.    Protein: Choose  -1/2 cup low-fat cottage cheese  -2 hard boiled eggs , or one cooked in olive oil (low/slow heat).  -1 low fat string cheese stick  -1 Tablespoon natural peanut butter  -[employer] vegetarian sausage joy (found in freezer section)  -1 slice lowfat cheese  -6 oz 2% or lowfat Greek yogurt, such as Fage or Oikos.    PLUS    Whole Grains:  Choose   -1 whole wheat English muffin  -1 " whole wheat jona, half  -1/2 Fiber One frozen muffin, thawed  -1/2 Fiber One toaster pastry  -1 whole wheat bagel thin  -1/2 cup Kashi cereal  -1 Kashi waffle (or other whole grain high-fiber waffle)  Aim for whole grain/sprouted breads with at least 3g of fiber/slice if having bread. [place] is one such brand.    OR    Fruit: Choose  -1/3 cup blueberries  -1/2 banana (or a plantain- similar to a banana, yet smaller)  -1/2 cup cantaloupe cubes  -1 small apple  -1 small orange  -1/2 cup strawberries  -handful raspberries/blackberries (each berry is about 1 calorie).    *Adapted from [employer], Fall 20    Ten Breakfasts Under 250 calories    Ideally, getting between 350-600 calories  (depending on starting height and weight)for breakfast is ideal for avoiding hunger later in the day, adjust/add to the following accordingly:    One- 250 calories, 8.5 g protein  1 slice whole-grain toast   1 Tbsp peanut butter    banana    Two- 250 calories, 8 g protein    cup nonfat/lowfat yogurt  1/3rd cup diced no-sugar peaches  1/3rd cup cereal (like Special K, Cheerios, or bran flakes)    Three- 250 calories, 25 g protein  1 egg scrambled with 1 oz skim milk    cup shredded cheddar    whole grain English muffin  1 oz [place] hampton  1 tsp margarine spread    Four- 225 calories, 25 g protein  1/2 cup Kashi Go-Lean cereal    cup skim milk mixed with 1 scoop Bariatric Advantage protein powder    cup no-sugar diced pears    Five- 250 calories, 20 g protein    cup oatmeal prepared with skim milk, 1 scoop protein powder, and sugar-free maple syrup    Six- 200 calories, 5 g protein  1 whole grain waffle, toasted  1 tablespoon creamy peanut or almond butter    Seven-  250 calories, 19 g protein  Breakfast sandwich: 1 slice whole grain toast, cut in half.  Add 1 scrambled egg and one slice cheddar  cheese.    Eight-  250 calories, 15 g protein  2 eggs scrambled with 1/3 cup frozen spinach (heat before adding to eggs) and 2  tablespoons low fat cream cheese.    Nine-  150 calories, 15 g protein  2/3rd cup cottage cheese    cup cantaloupe    Ten- 200 calories, 20 g protein  Fruit smoothie made with 4 oz. nonfat Greek yogurt,   cup berries, 1 scoop protein powder, and 4 oz skim milk.    Ten Lunches Under 250 Calories    Aim for lunch to be around 300-400 calories a day when trying to lose weight and get that protein in!    One- 200 calories, 11 g protein  1/3 cup tuna salad made with light harris on 1 slice whole grain bread  1 small peeled apple    Two- 250 calories, 16 g protein  1/3 cup lowfat cottage cheese    cup cooked green beans    small fruit cocktail (in natural juice)    Three- 200 calories, 11 g protein    grilled cheese sandwich on whole grain bread with lowfat cheese  2/3rd cup of tomato soup    Four- 250 calories, 22 g protein  Deli wrap: 1 oz sliced turkey, 1 oz sliced ham, 1 oz sliced chicken rolled up with 1 slice low-fat cheese  1 small orange    Five- 250 calories, 28 g protein  2/3rd cup chili with 1 oz shredded cheese  4 saltine crackers    Six- 250 calories, 22 g protein  1 cup fresh spinach with 2 oz chicken, 1/3rd cup mandarin oranges, and 2 tablespoons sliced almonds with 1 tablespoon  vinaigrette dressing    Seven- 200 calories, 11 g protein  1 Tbsp sugar-free preserves and 1 Tbsp peanut butter on 1 slice whole grain toast    cup nonfat/lowfat Greek yogurt    Eight- 250 calories, 18 g protein  1 small soft-shell chicken taco with 1 oz shredded cheese, lettuce, tomato, salsa, and 1 Tbsp light sour cream    cup black beans    Nine- 225 calories, 13 g protein  2 ounces baked chicken  1/4 cup mashed potatoes    cup green beans    Ten- 200 calories, 21 g protein  Deli jona: 2 oz roast beef or other deli meat with 1 tsp Geo mayonnaise and sliced tomato, onion, and lettuce  1/3rd cup cottage cheese      Ten Dinners Under 300 calories    If you're eating a large breakfast and medium lunch, keep dinner small.  300-400  calories is ideal for most people depending on their caloric needs.    One- 300 calories, 12 g protein  1-inch thick slice of turkey meatloaf    cup baked butternut squash    Two- 200 calories, 9 g protein  Bread-less BLT: 3 slices turkey hampton, sliced tomato, wrapped in a large lettuce leaf    cup peeled fruit    Three- 275 calories, 36 g protein  3 oz roasted chicken    cup cooked broccoli    cup shredded cheddar cheese    cup unsweetened applesauce    Four- 200 calories, 25 g protein  3 oz baked tilapia  1/3rd cup cooked carrots    cup yogurt    Five- 250 calories, 20 g protein  Grilled ham  n  Swiss: spread 2 tsp ghee or butter on 1 slice of whole grain bread.  Cut bread in half, layer 2 oz deli ham with 1 piece of Swiss cheese and grill until cheese is melted.    cup cooked vegetables    Six- 250 calories, 18 g protein  Vegetarian cheeseburger: 1 [place] cheeseburger topped with lettuce, onion, tomato, and ketchup/mustard    cup sweet potato fries    Seven- 250 calories, 18 g protein  Pork pot roast: 2 oz roasted pork loin, 1/3rd cup roasted carrots,   medium potato, cooked with   cup gravy    Eight- 330 calories, 25 g protein  2 oz meatballs (about 2 small meatballs)    cup spaghetti sauce  1/2 piece toast topped with 1 tsp ghee or butterand topped with garlic powder, toasted in oven    Nine- 250 calories, 16 g protein  Mexican pizza: one 8  corn tortilla topped with 2 oz chicken,   cup salsa, 2 tablespoons black beans, 2 tablespoons shredded cheese.  Bake until cheese is melted.    Ten- 250 calories, 22 g protein  Shrimp stir-layton: 3 oz cooked shrimp, 1/6th onion,   pepper,   cup chopped carrots sautéed in 1 tablespoon olive oil, topped with 2 tablespoons stir layton sauce and a pinch of sesame seeds        150 Calories or Less Snack Ideas   1 hardboiled egg with   cup berries  1 small apple with 1 hardboiled egg  10 almonds with   cup berries  2 clementines with 1 light string cheese  1 light string cheese with    sliced apple  1 light string cheese wrapped in 2 slices of turkey  4 100% whole wheat crackers (e.g. Triscuit) with 1 light string cheese    c. cottage cheese with   cup fruit and 1 Tbsp sunflower seeds     cup cottage cheese with   of an avocado     can tuna fish with 1 cup sliced cucumbers     cup roasted garbanzo beans with paprika and cayenne pepper    baked sweet potato with   cup chili beans or   cup cottage cheese  2 oz. nitrate free turkey slices with 1 cup carrots  1 container (6 oz) of low sugar (less than 10 grams of sugar) greek yogurt   3 Tablespoons of hummus with 1 cup sliced bell peppers   2 Tablespoons of hummus with 15 baby carrots  4 Tablespoons ranch dip made with plain Greek Yogurt and 3 mini cucumbers  1/4 cup nuts (any kind)  1 Tablespoon peanut butter with 1 stalk celery   1 dill pickle wrapped in 1-2 slices of deli ham with 1 tsp of mayonnaise/mustard.        Zepbound (Tirzepatide) is a very effective satiety boosting appetite suppressant that elevates satiety hormones GLP1 and GIP. It needs to be ramped up slowly to be tolerated adequately.  About 1/10 people will not tolerate this medication. Each month, you move up to a higher dose until eventually reaching the 10mg/week dose if tolerated with further ramping to follow if needed. If intolerant or severe side effects, a dose decrease would be wise, so keep me posted if not tolerated the ramping well. This may be a longer term medication based on individual needs/physiology and appetite control.     Injections can be given after cleansing the skin with alcohol prep pad or swab (available OTC).     Stop Zepbound if severe abdominal pain/vomiting/rash/throat swelling or constant nausea that prevents adequate food/water intake. Stop 2-3 weeks prior to any planned general anesthesia surgeries to reduce risk for something called a post operative ileus.     Gallstones can occur in about 1% of patients on this medication so update me if increase  right upper abdominal pain after eating.     Start meals with protein first, separate beverages from meals by 20 minutes and work hard in between meals to get your 64-75 oz of water daily to reduce risks for severe constipation. Consider a fiber supplement like powdered psyllium husk in 12 oz water each night, stool softerners as needed and Miralax or milk of Magnesia if more than 3 days have passed without a Bowel Movement.     Check out digitalbox for patient resources.  If you have weekends off, I recommend dosing Friday evenings.     Some people starve on this medication if not mindful about food intake. I recommend starting meals with the protein part of your meal first, chew thoroughly and separate beverages from meals by about 20 minutes to make sure you get your nourishment in first. Include vegetables/complex carbohydrates and unsaturated fat as part of your balanced diet but group these at the end of the meal, after your protein is mostly gone. Satiety will kick in too early if drinking too much with meals and under-nourishment can result.     It's not a bad idea to take a complete multivitamin most days of the week if using this medication. Adequate hydration is essential for feeling your best, efficient fat burning, waste elimination and constipation prevention. For those without fluid restrictions due to other disease, the goal is at least one ounce of water per gram of protein consumed with a  minimum of 64oz/day goal. Stool softners and fiber supplements as well as occasional laxative use (miralax, etc) may be necessary if getting too constipated.    Pancreatitis is a very rare but potentially serious side effect. Stop Zepbound if severe mid abdominal pain/burning in nature or if unable to eat/drink due to severe nausea/discomfort.   People with strong history of pancreatitis without clear cause should stay clear of this medication as should those planning to get pregnant, those with strong personal  or family history for medullary thyroid cancer or Multiple Endocrine Neoplasia (rare).     Stop Zepbound at least 1-2 weeks prior to any planned surgery.    Kind Regards,  Francisco Fine MD  [facility]

## 2024-04-11 NOTE — NURSING NOTE
Is the patient currently in the state of [place]? YES    Visit mode:VIDEO    If the visit is dropped, the patient can be reconnected by: VIDEO VISIT: Text to cell phone:   Telephone Information:   Mobile [phone]       Will anyone else be joining the visit? NO  (If patient encounters technical issues they should call [phone]    How would you like to obtain your AVS? MyChart    Are changes needed to the allergy or medication list? Pt stated no changes to allergies and Pt stated no med changes    Reason for visit: Follow Up    Maureen Camargo VVF    No current wt to report

## 2024-05-18 ENCOUNTER — HEALTH MAINTENANCE LETTER (OUTPATIENT)
Age: 49
End: 2024-05-18

## 2024-05-21 DIAGNOSIS — E11.9 TYPE 2 DIABETES, HBA1C GOAL < 7% (H): ICD-10-CM

## 2024-05-21 DIAGNOSIS — E11.9 CONTROLLED TYPE 2 DIABETES MELLITUS WITHOUT COMPLICATION, WITHOUT LONG-TERM CURRENT USE OF INSULIN (H): ICD-10-CM

## 2024-05-21 RX ORDER — TIRZEPATIDE 12.5 MG/.5ML
12.5 INJECTION, SOLUTION SUBCUTANEOUS
Qty: 2 ML | Refills: 0 | Status: SHIPPED | OUTPATIENT
Start: 2024-05-31 | End: 2024-06-26

## 2024-05-21 NOTE — PROGRESS NOTES
Patient called and said that she is not ready to go up to the 15 mg after she finishes this round at the 12.5 mg because she has noticed some positive effects.  She has had to remind herself at times to eat and she is worried she will get worse with that if she goes up.  She will do another 4 doses of the 12.5 mg and then be in touch with us on how it is going.      Roula Saldana RN

## 2024-06-03 ENCOUNTER — VIRTUAL VISIT (OUTPATIENT)
Dept: SURGERY | Facility: CLINIC | Age: 49
End: 2024-06-03
Payer: COMMERCIAL

## 2024-06-03 DIAGNOSIS — E66.01 OBESITY, CLASS III, BMI 40-49.9 (MORBID OBESITY) (H): Primary | ICD-10-CM

## 2024-06-03 PROCEDURE — 97803 MED NUTRITION INDIV SUBSEQ: CPT | Mod: 93

## 2024-06-03 NOTE — PROGRESS NOTES
Eleanor Elizabeth is a 48 year old who is being evaluated via a billable telephone visit.      How would you like to obtain your AVS? [facility]        Medical  Weight Loss Follow-Up Diet Evaluation  Assessment:  Eleanor is presenting today for a follow up weight management nutrition consultation.  This patient has had an initial appointment and was referred by Dr. Fine for MNT as treatment for Morbid obesity which is impacting her type 2 DM, and pain.      Weight loss medication:  Mounjaro 12.5mg . Metformin   Pt's weight is: 246 lbs   Initial weight: 267 lbs   286 in April 2023  Weight change: 18 lbs down from initial        4/11/2024     1:39 PM   Changes and Difficulties   I have made the following changes to my diet since my last visit: no   With regards to my diet, I am still struggling with: craving for sweets coming back   I have made the following changes to my activity/exercise since my last visit: no   With regards to my activity/exercise, I am still struggling with: no     BMI: 43.21  Ideal body weight: 54.7 kg (120 lb 9.5 oz)  Adjusted ideal body weight: 81.4 kg (179 lb 5.7 oz)    Estimated RMR ([place]-St Jeor equation):  1740 kcals x 1.2 (sedentary) = 2090 kcals (for weight maintenance)  Recommended Protein Intake:  grams of protein/day  Patient Active Problem List:  Patient Active Problem List   Diagnosis    Lumbar spinal stenosis    Diabetes mellitus, type 2 (H)    BV (bacterial vaginosis)    Hereditary and idiopathic peripheral neuropathy    Pulmonary embolism (H)     Diabetes: Yes, type 2 - on Metformin   HbA1c:  5.4% (12/11/2023)    Progress on goals from last visit: Patient continues to track her calories via tylor. Noted she was frustrated at slight weight gain since her appointment with Dr. GRIFFIN. Continues tracking her steps (getting 10k at work). Stressors are high at this time with medical concerns (menstrual) and family medical appointments.     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 Discussed ways to increase protein at meal time without adjusting food prepared too much.  Nutrition counseling: goat setting, continued encouragement and support.    Monitoring/Evaluation:    Goals:  1,500kcal, 180-190g carbs/day, 90-100g protein daily  Continue with activity goals/walking     Patient to follow up in 2.5 month(s) with bariatrician and 5 month(s) with RD        Virtual Visit Details    Type of service:  Telephone Visit   Phone call duration: 14 minutes     Originating Location (pt. Location): Home      Distant Location (provider location):  Off-site        Pau Mensah RD

## 2024-06-03 NOTE — LETTER
6/3/2024         RE: Eleanor Elizabeth  [address] [facility] [address]        Dear Colleague,    Thank you for referring your patient, Eleanor Elizabeth, to the [facility] [place]. Please see a copy of my visit note below.    Eleanor Elizabeth is a 48 year old who is being evaluated via a billable telephone visit.      How would you like to obtain your AVS? [facility]        Medical  Weight Loss Follow-Up Diet Evaluation  Assessment:  Eleanor is presenting today for a follow up weight management nutrition consultation.  This patient has had an initial appointment and was referred by Dr. Fine for MNT as treatment for Morbid obesity which is impacting her type 2 DM, and pain.      Weight loss medication:  Mounjaro 12.5mg . Metformin   Pt's weight is: 246 lbs   Initial weight: 267 lbs   286 in April 2023  Weight change: 18 lbs down from initial        4/11/2024     1:39 PM   Changes and Difficulties   I have made the following changes to my diet since my last visit: no   With regards to my diet, I am still struggling with: craving for sweets coming back   I have made the following changes to my activity/exercise since my last visit: no   With regards to my activity/exercise, I am still struggling with: no     BMI: 43.21  Ideal body weight: 54.7 kg (120 lb 9.5 oz)  Adjusted ideal body weight: 81.4 kg (179 lb 5.7 oz)    Estimated RMR ([place]-St Jeor equation):  1740 kcals x 1.2 (sedentary) = 2090 kcals (for weight maintenance)  Recommended Protein Intake:  grams of protein/day  Patient Active Problem List:  Patient Active Problem List   Diagnosis     Lumbar spinal stenosis     Diabetes mellitus, type 2 (H)     BV (bacterial vaginosis)     Hereditary and idiopathic peripheral neuropathy     Pulmonary embolism (H)     Diabetes: Yes, type 2 - on Metformin   HbA1c:  5.4% (12/11/2023)    Progress on goals from last visit: Patient continues to track her calories  via tylor. Noted she was frustrated at slight weight gain since her appointment with Dr. GRIFFIN. Continues tracking her steps (getting 10k at work). Stressors are high at this time with medical concerns (menstrual) and family medical appointments.     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 Discussed ways to increase protein at meal time without adjusting food prepared too much.  Nutrition counseling: goat setting, continued encouragement and support.    Monitoring/Evaluation:    Goals:  1,500kcal, 180-190g carbs/day, 90-100g protein daily  Continue with activity goals/walking     Patient to follow up in 2.5 month(s) with bariatrician and 5 month(s) with JULIANO        Virtual Visit Details    Type of service:  Telephone Visit   Phone call duration: 14 minutes     Originating Location (pt. Location): Home      Distant Location (provider location):  Off-site        Pau Mensah RD           Again, thank you for allowing me to participate in the care of your patient.        Sincerely,        Pau Mensah RD

## 2024-06-26 DIAGNOSIS — E11.9 TYPE 2 DIABETES, HBA1C GOAL < 7% (H): ICD-10-CM

## 2024-06-26 DIAGNOSIS — E11.9 CONTROLLED TYPE 2 DIABETES MELLITUS WITHOUT COMPLICATION, WITHOUT LONG-TERM CURRENT USE OF INSULIN (H): ICD-10-CM

## 2024-06-26 RX ORDER — TIRZEPATIDE 12.5 MG/.5ML
12.5 INJECTION, SOLUTION SUBCUTANEOUS
Qty: 2 ML | Refills: 1 | Status: SHIPPED | OUTPATIENT
Start: 2024-06-26

## 2024-06-26 NOTE — PROGRESS NOTES
Pt calling to check in and let  know that the 12.5 mg dose is working well for her and she'd like to continue at least until her upcoming visit in August. Script sent to her pharmacy.    Mary Hernandez RN, CBN  [facility]  P [phone]  F [phone]

## 2024-08-21 ENCOUNTER — OFFICE VISIT (OUTPATIENT)
Dept: SURGERY | Facility: CLINIC | Age: 49
End: 2024-08-21
Payer: COMMERCIAL

## 2024-08-21 VITALS
DIASTOLIC BLOOD PRESSURE: 72 MMHG | HEIGHT: 64 IN | WEIGHT: 241.9 LBS | BODY MASS INDEX: 41.3 KG/M2 | SYSTOLIC BLOOD PRESSURE: 128 MMHG

## 2024-08-21 DIAGNOSIS — E11.9 CONTROLLED TYPE 2 DIABETES MELLITUS WITHOUT COMPLICATION, WITHOUT LONG-TERM CURRENT USE OF INSULIN (H): ICD-10-CM

## 2024-08-21 DIAGNOSIS — E66.01 CLASS 3 SEVERE OBESITY DUE TO EXCESS CALORIES WITH SERIOUS COMORBIDITY AND BODY MASS INDEX (BMI) OF 45.0 TO 49.9 IN ADULT (H): Primary | ICD-10-CM

## 2024-08-21 DIAGNOSIS — E66.813 CLASS 3 SEVERE OBESITY DUE TO EXCESS CALORIES WITH SERIOUS COMORBIDITY AND BODY MASS INDEX (BMI) OF 45.0 TO 49.9 IN ADULT (H): Primary | ICD-10-CM

## 2024-08-21 DIAGNOSIS — Z87.19 HISTORY OF FATTY INFILTRATION OF LIVER: ICD-10-CM

## 2024-08-21 PROCEDURE — 99214 OFFICE O/P EST MOD 30 MIN: CPT | Performed by: EMERGENCY MEDICINE

## 2024-08-21 NOTE — PATIENT INSTRUCTIONS
Plan:   1.  Diet: aiming for 70-80 grams of lean protein daily and  beverages from meals well.     2. Exercise: 15k steps daily regularly.    3. Medication: Mounjaro will increase up to 15mg/week when 12.5mg/week dose is done in next week or two.     4. A1c much improved. Getting weight under 220 lbs and BMI under 35 is a likely threshold that type II diabetes will likely go into remission.    5. Goals: aiming to eventually run Leian. I'd recommend holding off jogging until weight under 220 lbs.         Zepbound (Tirzepatide) is a very effective satiety boosting appetite suppressant that elevates satiety hormones GLP1 and GIP. It needs to be ramped up slowly to be tolerated adequately.  About 1/10 people will not tolerate this medication. Each month, you move up to a higher dose until eventually reaching the 10mg/week dose if tolerated with further ramping to follow if needed. If intolerant or severe side effects, a dose decrease would be wise, so keep me posted if not tolerated the ramping well. This may be a longer term medication based on individual needs/physiology and appetite control.     Injections can be given after cleansing the skin with alcohol prep pad or swab (available OTC).     Stop Zepbound if severe abdominal pain/vomiting/rash/throat swelling or constant nausea that prevents adequate food/water intake. Stop 2-3 weeks prior to any planned general anesthesia surgeries to reduce risk for something called a post operative ileus.     Gallstones can occur in about 1% of patients on this medication so update me if increase right upper abdominal pain after eating.     Start meals with protein first, separate beverages from meals by 20 minutes and work hard in between meals to get your 64-75 oz of water daily to reduce risks for severe constipation. Consider a fiber supplement like powdered psyllium husk in 12 oz water each night, stool softerners as needed and Miralax or milk of Magnesia if more  than 3 days have passed without a Bowel Movement. Some other options include:  For Prevention and Treatment of Constipation when on Semaglutide     From least aggressive to most aggressive:     Move: Wallking is essential - the more we move, the more our bowels move  Water: Drink water - 64oz or more a day  Go when you need to go. Don't wait. The longer you wait, the harder it gets.  Fiber: Fruit, raw veggies, nuts, whole grains, prune each night, flax/shrei seeds added into meals can all be helpful.   Stool Softeners: if constipation is mild and for maintenance  Gentle laxatives: Miralax, senokot, dulcolax , Smooth move tea as needed     More aggressive (and typically won't get to this point)  Milk of Magnesia  Mag Citrate (what you drink before a colonoscopy)  Suppositories  Enema      Check out [facility] [employer] for patient resources.  If you have weekends off, I recommend dosing Friday evenings.     Some people starve on this medication if not mindful about food intake. I recommend starting meals with the protein part of your meal first, chew thoroughly and separate beverages from meals by about 20 minutes to make sure you get your nourishment in first. Include vegetables/complex carbohydrates and unsaturated fat as part of your balanced diet but group these at the end of the meal, after your protein is mostly gone. Satiety will kick in too early if drinking too much with meals and under-nourishment can result.     It's not a bad idea to take a complete multivitamin most days of the week if using this medication. Lower iron content tends to be less constipating.     Adequate hydration is essential for feeling your best, efficient fat burning, waste elimination and constipation prevention. For those without fluid restrictions due to other disease, the goal is at least one ounce of water per gram of protein consumed with a  minimum of 64oz/day goal.     Pancreatitis is a very rare but potentially serious side effect.  Stop Zepbound if severe mid abdominal pain/burning in nature or if unable to eat/drink due to severe nausea/discomfort.   People with strong history of pancreatitis without clear cause should stay clear of this medication as should those planning to get pregnant, those with strong personal or family history for medullary thyroid cancer or Multiple Endocrine Neoplasia (rare).     Stop Zepbound at least 2 weeks prior to any planned surgery.  Stop until fully recovered if unexpected/emergent surgery is needed with anticipation that re-ramping will be needed if off longer than 14-16 days since last dose.    Kind Regards,  Francisco Fine MD  [facility]      LEAN PROTEIN SOURCES  Getting 20-30 grams of protein, 3 meals daily, is appropriate for most people, some need more but more than about 40 grams per meal is not useful.  General rule is drinking one ounce of water per gram of protein eaten over the course of the day:  70 grams of protein each day, drink 70 oz of water.  Protein Source Portion Calories Grams of Protein                           Nonfat, plain Greek yogurt    (10 grams sugar or less) 3/4 cup (6 oz)  12-17   Light Yogurt (10 grams sugar or less) 3/4 cup (6 oz)  6-8   Protein Shake 1 shake 110-180 15-30   Skim/1% Milk or lactose-free milk 1 cup ( 8 oz)  8   Plain or light, flavored soymilk 1 cup  7-8   Plain or light, hemp milk 1 cup 110 6   Fat Free or 1% Cottage Cheese 1/2 cup 90 15   Part skim ricotta cheese 1/2 cup 100 14   Part skim or reduced fat cheese slices 1 ounce 65-80 8     Mozzarella String Cheese 1 80 8   Canned tuna, chicken, crab or salmon  (canned in water)  1/2 cup 100 15-20   White fish (broiled, grilled, baked) 3 ounces 100 21   [place]/Tuna (broiled, grilled, baked) 3 ounces 150-180 21   Shrimp, Scallops, Lobster, Crab 3 ounces 100 21   Pork loin, Pork Tenderloin 3 ounces 150 21   Boneless, skinless chicken /turkey breast                           (broiled, grilled, baked) 3 ounces 120 21   Crouse, [place], [place], and Venison 3 ounces 120 21   Lean cuts of red meat and pork (sirloin,   round, tenderloin, flank, ground 93%-96%) 3 ounces 170 21   Lean or Extra Lean Ground Turkey 1/2 cup 150 20   90-95% Lean [place]ship Burger 1 joy 140-180 21   Low-fat casserole with lean meat 3/4 cup 200 17   Luncheon Meats                                                        (turkey, lean ham, roast beef, chicken) 3 ounces 100 21   Egg (boiled, poached, scrambled) 1 Egg 60 7   Egg Substitute 1/2 cup 70 10   Nuts (limit to 1 serving per day)  3 Tbsp. 150 7   Nut [place] (peanut, almond)  Limit to 1 serving or less daily 1 Tbsp. 90 4   Soy Burger (varies) 1  15   Garbanzo, Black, [place] Beans 1/2 cup 110 7   Refried Beans 1/2 cup 100 7   Kidney and Lima beans 1/2 cup 110 7   Tempeh 3 oz 175 18   Vegan crumbles 1/2 cup 100 14   Tofu 1/2 cup 110 14   Chili (beans and extra lean beef or turkey) 1 cup 200 23   Lentil Stew/Soup 1 cup 150 12   Black Bean Soup 1 cup 175 12

## 2024-08-21 NOTE — LETTER
8/21/2024      Eleanor Elizabeth  [address] [facility] [address]      Dear Colleague,    Thank you for referring your patient, Eleanor Elizabeth, to the [facility] [place]. Please see a copy of my visit note below.    Bariatric Clinic Follow-Up Visit:    Eleanor Elizabeth is a 49 year old  female with Body mass index is 42.18 kg/m .  presenting here today for follow-up on non-surgical efforts for weight loss. Original Intake visit occurred on 9/1/23 with a weight of 267 lbs and BMI of 45.9 and co-morbid type II diabetes.  Peak weight of 282 lbs. Along with diet and behavior changes, she has been using tirzepatide to assist her weight loss goals ramping to efficacy/tolerance for both diabetes and morbid obesity benefits..  See her intake visit notes for details on identified contributors to weight gain in the past. Chart review shows Dietician calculated RMR of 1830kcal/day and protein intake goal of 70-90g/day..     Weight:   Wt Readings from Last 5 Encounters:   08/21/24 109.7 kg (241 lb 14.4 oz)   12/07/23 114.3 kg (252 lb)   09/01/23 121.3 kg (267 lb 8 oz)   04/15/23 124.3 kg (274 lb)   04/10/23 124.3 kg (274 lb)    pounds  Lab Results   Component Value Date    A1C 6.4 09/01/2023    A1C 7.0 04/10/2023   6/14/24 A1c was down to 5.3% in [facility] Everywhere.  Reports daily average glucose of 90mg/dl now.     33 lbs down from peak weight in 2023 of 274 lbs.    Comorbidities:  Patient Active Problem List   Diagnosis     Lumbar spinal stenosis     Diabetes mellitus, type 2 (H)     BV (bacterial vaginosis)     Hereditary and idiopathic peripheral neuropathy     Pulmonary embolism (H)       Current Outpatient Medications:      aspirin (ASA) 81 MG EC tablet, Take 1 tablet (81 mg) by mouth daily, Disp: 30 tablet, Rfl: 0     Blood Glucose Monitoring Suppl (CONTOUR NEXT ONE) KIT, 1 DEVICE BY NOT IN USE ROUTE . FOR 1 DOSE. USE AS DIRECTED., Disp: , Rfl:       cholecalciferol (VITAMIN D3) 10 mcg (400 units) TABS tablet, Take 10 mcg by mouth daily, Disp: , Rfl:      CONTOUR NEXT TEST test strip, Use to test bg 1 time daily. Use as directed., Disp: , Rfl:      lisinopril (ZESTRIL) 10 MG tablet, Take 1 tablet (10 mg) by mouth daily, Disp: 30 tablet, Rfl: 1     metFORMIN (GLUCOPHAGE XR) 500 MG 24 hr tablet, Take 2 tablets (1,000 mg) by mouth daily (with dinner), Disp: 60 tablet, Rfl: 0     Microlet Lancets MISC, USE 1 EACH TO TEST DAILY., Disp: , Rfl:      Multiple Vitamins-Iron (MULTIPLE VITAMIN/IRON OR), Take 1 tablet by mouth daily, Disp: , Rfl:      norethindrone (MICRONOR) 0.35 MG tablet, Take 0.35 mg by mouth At Bedtime, Disp: , Rfl:      tirzepatide (MOUNJARO) 12.5 MG/0.5ML pen, Inject 12.5 mg Subcutaneous every 7 days, Disp: 2 mL, Rfl: 1     acetaminophen (TYLENOL) 325 MG tablet, Take 2 tablets (650 mg) by mouth every 4 hours as needed for other (For optimal non-opioid multimodal pain management to improve pain control.) (Patient not taking: Reported on 12/7/2023), Disp: 100 tablet, Rfl: 0     gabapentin (NEURONTIN) 300 MG capsule, Take 300 mg by mouth 3 times daily (Patient not taking: Reported on 9/1/2023), Disp: , Rfl:      hydrOXYzine (ATARAX) 25 MG tablet, Take 1 tablet (25 mg) by mouth every 6 hours as needed for other (adjuvant pain) (Patient not taking: Reported on 9/1/2023), Disp: 30 tablet, Rfl: 0     rosuvastatin (CRESTOR) 10 MG tablet, Take 10 mg by mouth daily, Disp: , Rfl:      tirzepatide (MOUNJARO) 15 MG/0.5ML pen, Inject 15 mg Subcutaneous every 7 days for 196 days (Patient not taking: Reported on 8/21/2024), Disp: 2 mL, Rfl: 6     tiZANidine (ZANAFLEX) 2 MG tablet, Take 1-2 tablets (2-4 mg) by mouth 3 times daily as needed for muscle spasms (Patient not taking: Reported on 8/21/2024), Disp: 20 tablet, Rfl: 0      Interim: Since our last visit, she has continued losing weight, Mounjaro 12.5mg/week tolerated and planning to go up to the 15mg/week dose.  "  One year from now would like to run the Elian with her sisters.     Plan:   1.  Diet: aiming for 70-80 grams of lean protein daily and  beverages from meals well.     2. Exercise: 15k steps daily regularly.    3. Medication: Mounjaro will increase up to 15mg/week when 12.5mg/week dose is done in next week or two.     4. A1c much improved. Getting weight under 220 lbs and BMI under 35 is a likely threshold that type II diabetes will likely go into remission.    5. Goals: aiming to eventually run [employer]. I'd recommend holding off jogging until weight under 220 lbs. Knees over toes program recommended.        We discussed [facility]ariat[facility] Basics including:  -eating 3 meals daily  -reviewed metabolic needs for weight loss based on Resting Metabolic Rate  -protein goals supportive of healthy weight loss  -avoiding/limiting calorie containing beverages  -We discussed the importance of restorative sleep and stress management in maintaining a healthy weight.  -We discussed the National Weight Control Registry healthy weight maintenance strategies and ways to optimize metabolism.  -We discussed the importance of physical activity including cardiovascular and strength training in maintaining a healthier weight and explored viable options.      Most recent labs:  Lab Results   Component Value Date    WBC 11.4 (H) 09/01/2023    HGB 15.2 09/01/2023    HCT 42.3 09/01/2023    MCV 88 09/01/2023     09/01/2023     No results found for: \"CHOL\"  No results found for: \"HDL\"  No components found for: \"LDLCALC\"  No results found for: \"TRIG\"  No results found for: \"CHOLHDL\"  Lab Results   Component Value Date    ALT 38 09/01/2023    AST 42 09/01/2023    ALKPHOS 87 09/01/2023     No results found for: \"HGBA1C\"  Lab Results   Component Value Date    B12 726 09/01/2023     No components found for: \"VITDT1\"  No results found for: \"RAI\"  Lab Results   Component Value Date    PTHI 14 (L) 09/01/2023     No results found " "for: \"ZN\"  Lab Results   Component Value Date    VIB1WB 315 (H) 09/01/2023     Lab Results   Component Value Date    TSH 1.15 09/01/2023     No results found for: \"TEST\"    DIETARY HISTORY  Tracking intake well, finding portion control is going well.      PHYSICAL ACTIVITY PATTERNS:  Cardiovascular: walking whenever she can. She'd like to start training for next year's Elian and we discussed timing her training to lower weight and using lower impact options for now until weight under 220 lbs.   Strength Training: chores.    REVIEW OF SYSTEMS  Feels good on Mounjaro. A1c improving. No upset stomach/vomiting/diarrhea. Has some intertriginous, abdominal pannus rash at times this summer. Discussed hygiene options..  PHYSICAL EXAM:  Vitals: /72   Ht 1.613 m (5' 3.5\")   Wt 109.7 kg (241 lb 14.4 oz)   BMI 42.18 kg/m    Weight:   Wt Readings from Last 3 Encounters:   08/21/24 109.7 kg (241 lb 14.4 oz)   12/07/23 114.3 kg (252 lb)   09/01/23 121.3 kg (267 lb 8 oz)         GEN: Pleasant, well groomed, in no acute distress  HEENT:  normal facies .  NECK: No swelling.  HEART: RRR.  LUNGS: No respiratory difficulty noted. No cough. .  ABDOMEN: obese, nontender. No guarding..  EXTREMITIES: No tremor. Ambulation is independent...  NEURO: Alert and Oriented X3, fluent speech. .  SKIN: No visible rashes. .    Interim study results: care everywhere reviewed. .      30 minutes spent by me on the date of the encounter doing chart review, history and exam, documentation and further activities per the note   Francisco Fine MD  [facility]  8:01 AM  8/21/2024      Again, thank you for allowing me to participate in the care of your patient.        Sincerely,        Francisco Fine MD  "

## 2024-08-21 NOTE — PROGRESS NOTES
Bariatric Clinic Follow-Up Visit:    Eleanor Elizabeth is a 49 year old  female with Body mass index is 42.18 kg/m .  presenting here today for follow-up on non-surgical efforts for weight loss. Original Intake visit occurred on 9/1/23 with a weight of 267 lbs and BMI of 45.9 and co-morbid type II diabetes.  Peak weight of 282 lbs. Along with diet and behavior changes, she has been using tirzepatide to assist her weight loss goals ramping to efficacy/tolerance for both diabetes and morbid obesity benefits..  See her intake visit notes for details on identified contributors to weight gain in the past. Chart review shows Dietician calculated RMR of 1830kcal/day and protein intake goal of 70-90g/day..     Weight:   Wt Readings from Last 5 Encounters:   08/21/24 109.7 kg (241 lb 14.4 oz)   12/07/23 114.3 kg (252 lb)   09/01/23 121.3 kg (267 lb 8 oz)   04/15/23 124.3 kg (274 lb)   04/10/23 124.3 kg (274 lb)    pounds  Lab Results   Component Value Date    A1C 6.4 09/01/2023    A1C 7.0 04/10/2023   6/14/24 A1c was down to 5.3% in [facility] Everywhere.  Reports daily average glucose of 90mg/dl now.     33 lbs down from peak weight in 2023 of 274 lbs.    Comorbidities:  Patient Active Problem List   Diagnosis    Lumbar spinal stenosis    Diabetes mellitus, type 2 (H)    BV (bacterial vaginosis)    Hereditary and idiopathic peripheral neuropathy    Pulmonary embolism (H)       Current Outpatient Medications:     aspirin (ASA) 81 MG EC tablet, Take 1 tablet (81 mg) by mouth daily, Disp: 30 tablet, Rfl: 0    Blood Glucose Monitoring Suppl (CONTOUR NEXT ONE) KIT, 1 DEVICE BY NOT IN USE ROUTE . FOR 1 DOSE. USE AS DIRECTED., Disp: , Rfl:     cholecalciferol (VITAMIN D3) 10 mcg (400 units) TABS tablet, Take 10 mcg by mouth daily, Disp: , Rfl:     CONTOUR NEXT TEST test strip, Use to test bg 1 time daily. Use as directed., Disp: , Rfl:     lisinopril (ZESTRIL) 10 MG tablet, Take 1 tablet (10 mg) by mouth daily, Disp: 30  tablet, Rfl: 1    metFORMIN (GLUCOPHAGE XR) 500 MG 24 hr tablet, Take 2 tablets (1,000 mg) by mouth daily (with dinner), Disp: 60 tablet, Rfl: 0    Microlet Lancets MISC, USE 1 EACH TO TEST DAILY., Disp: , Rfl:     Multiple Vitamins-Iron (MULTIPLE VITAMIN/IRON OR), Take 1 tablet by mouth daily, Disp: , Rfl:     norethindrone (MICRONOR) 0.35 MG tablet, Take 0.35 mg by mouth At Bedtime, Disp: , Rfl:     tirzepatide (MOUNJARO) 12.5 MG/0.5ML pen, Inject 12.5 mg Subcutaneous every 7 days, Disp: 2 mL, Rfl: 1    acetaminophen (TYLENOL) 325 MG tablet, Take 2 tablets (650 mg) by mouth every 4 hours as needed for other (For optimal non-opioid multimodal pain management to improve pain control.) (Patient not taking: Reported on 12/7/2023), Disp: 100 tablet, Rfl: 0    gabapentin (NEURONTIN) 300 MG capsule, Take 300 mg by mouth 3 times daily (Patient not taking: Reported on 9/1/2023), Disp: , Rfl:     hydrOXYzine (ATARAX) 25 MG tablet, Take 1 tablet (25 mg) by mouth every 6 hours as needed for other (adjuvant pain) (Patient not taking: Reported on 9/1/2023), Disp: 30 tablet, Rfl: 0    rosuvastatin (CRESTOR) 10 MG tablet, Take 10 mg by mouth daily, Disp: , Rfl:     tirzepatide (MOUNJARO) 15 MG/0.5ML pen, Inject 15 mg Subcutaneous every 7 days for 196 days (Patient not taking: Reported on 8/21/2024), Disp: 2 mL, Rfl: 6    tiZANidine (ZANAFLEX) 2 MG tablet, Take 1-2 tablets (2-4 mg) by mouth 3 times daily as needed for muscle spasms (Patient not taking: Reported on 8/21/2024), Disp: 20 tablet, Rfl: 0      Interim: Since our last visit, she has continued losing weight, Mounjaro 12.5mg/week tolerated and planning to go up to the 15mg/week dose.   One year from now would like to run the [employer] with her sisters.     Plan:   1.  Diet: aiming for 70-80 grams of lean protein daily and  beverages from meals well.     2. Exercise: 15k steps daily regularly.    3. Medication: Mounjaro will increase up to 15mg/week when 12.5mg/week  "dose is done in next week or two.     4. A1c much improved. Getting weight under 220 lbs and BMI under 35 is a likely threshold that type II diabetes will likely go into remission.    5. Goals: aiming to eventually run [employer]. I'd recommend holding off jogging until weight under 220 lbs. Knees over toes program recommended.        We discussed [facility] Bariatric Basics including:  -eating 3 meals daily  -reviewed metabolic needs for weight loss based on Resting Metabolic Rate  -protein goals supportive of healthy weight loss  -avoiding/limiting calorie containing beverages  -We discussed the importance of restorative sleep and stress management in maintaining a healthy weight.  -We discussed the National Weight Control Registry healthy weight maintenance strategies and ways to optimize metabolism.  -We discussed the importance of physical activity including cardiovascular and strength training in maintaining a healthier weight and explored viable options.      Most recent labs:  Lab Results   Component Value Date    WBC 11.4 (H) 09/01/2023    HGB 15.2 09/01/2023    HCT 42.3 09/01/2023    MCV 88 09/01/2023     09/01/2023     No results found for: \"CHOL\"  No results found for: \"HDL\"  No components found for: \"LDLCALC\"  No results found for: \"TRIG\"  No results found for: \"CHOLHDL\"  Lab Results   Component Value Date    ALT 38 09/01/2023    AST 42 09/01/2023    ALKPHOS 87 09/01/2023     No results found for: \"HGBA1C\"  Lab Results   Component Value Date    B12 726 09/01/2023     No components found for: \"VITDT1\"  No results found for: \"RAI\"  Lab Results   Component Value Date    PTHI 14 (L) 09/01/2023     No results found for: \"ZN\"  Lab Results   Component Value Date    VIB1WB 315 (H) 09/01/2023     Lab Results   Component Value Date    TSH 1.15 09/01/2023     No results found for: \"TEST\"    DIETARY HISTORY  Tracking intake well, finding portion control is going well.      PHYSICAL ACTIVITY " "PATTERNS:  Cardiovascular: walking whenever she can. She'd like to start training for next year's [employer] and we discussed timing her training to lower weight and using lower impact options for now until weight under 220 lbs.   Strength Training: chores.    REVIEW OF SYSTEMS  Feels good on Mounjaro. A1c improving. No upset stomach/vomiting/diarrhea. Has some intertriginous, abdominal pannus rash at times this summer. Discussed hygiene options..  PHYSICAL EXAM:  Vitals: /72   Ht 1.613 m (5' 3.5\")   Wt 109.7 kg (241 lb 14.4 oz)   BMI 42.18 kg/m    Weight:   Wt Readings from Last 3 Encounters:   08/21/24 109.7 kg (241 lb 14.4 oz)   12/07/23 114.3 kg (252 lb)   09/01/23 121.3 kg (267 lb 8 oz)         GEN: Pleasant, well groomed, in no acute distress  HEENT:  normal facies .  NECK: No swelling.  HEART: RRR.  LUNGS: No respiratory difficulty noted. No cough. .  ABDOMEN: obese, nontender. No guarding..  EXTREMITIES: No tremor. Ambulation is independent...  NEURO: Alert and Oriented X3, fluent speech. .  SKIN: No visible rashes. .    Interim study results: care everywhere reviewed. .      30 minutes spent by me on the date of the encounter doing chart review, history and exam, documentation and further activities per the note   Francisco Fine MD  [facility]  8:01 AM  8/21/2024  "

## 2024-08-28 DIAGNOSIS — E11.9 CONTROLLED TYPE 2 DIABETES MELLITUS WITHOUT COMPLICATION, WITHOUT LONG-TERM CURRENT USE OF INSULIN (H): ICD-10-CM

## 2024-08-28 DIAGNOSIS — E66.813 CLASS 3 SEVERE OBESITY DUE TO EXCESS CALORIES WITH SERIOUS COMORBIDITY AND BODY MASS INDEX (BMI) OF 45.0 TO 49.9 IN ADULT (H): Primary | ICD-10-CM

## 2024-08-28 DIAGNOSIS — E66.01 CLASS 3 SEVERE OBESITY DUE TO EXCESS CALORIES WITH SERIOUS COMORBIDITY AND BODY MASS INDEX (BMI) OF 45.0 TO 49.9 IN ADULT (H): Primary | ICD-10-CM

## 2024-09-04 ENCOUNTER — LAB (OUTPATIENT)
Dept: LAB | Facility: CLINIC | Age: 49
End: 2024-09-04
Payer: COMMERCIAL

## 2024-09-04 DIAGNOSIS — E11.9 CONTROLLED TYPE 2 DIABETES MELLITUS WITHOUT COMPLICATION, WITHOUT LONG-TERM CURRENT USE OF INSULIN (H): ICD-10-CM

## 2024-09-04 DIAGNOSIS — E66.813 CLASS 3 SEVERE OBESITY DUE TO EXCESS CALORIES WITH SERIOUS COMORBIDITY AND BODY MASS INDEX (BMI) OF 45.0 TO 49.9 IN ADULT (H): ICD-10-CM

## 2024-09-04 DIAGNOSIS — E66.01 CLASS 3 SEVERE OBESITY DUE TO EXCESS CALORIES WITH SERIOUS COMORBIDITY AND BODY MASS INDEX (BMI) OF 45.0 TO 49.9 IN ADULT (H): ICD-10-CM

## 2024-09-04 LAB
ALBUMIN SERPL BCG-MCNC: 4.3 G/DL (ref 3.5–5.2)
ALP SERPL-CCNC: 80 U/L (ref 40–150)
ALT SERPL W P-5'-P-CCNC: 15 U/L (ref 0–50)
ANION GAP SERPL CALCULATED.3IONS-SCNC: 7 MMOL/L (ref 7–15)
AST SERPL W P-5'-P-CCNC: 20 U/L (ref 0–45)
BILIRUB SERPL-MCNC: 0.7 MG/DL
BUN SERPL-MCNC: 15.8 MG/DL (ref 6–20)
CALCIUM SERPL-MCNC: 9.6 MG/DL (ref 8.8–10.4)
CHLORIDE SERPL-SCNC: 108 MMOL/L (ref 98–107)
CREAT SERPL-MCNC: 0.81 MG/DL (ref 0.51–0.95)
EGFRCR SERPLBLD CKD-EPI 2021: 89 ML/MIN/1.73M2
GLUCOSE SERPL-MCNC: 90 MG/DL (ref 70–99)
HBA1C MFR BLD: 5 % (ref 0–5.6)
HCO3 SERPL-SCNC: 25 MMOL/L (ref 22–29)
POTASSIUM SERPL-SCNC: 4.9 MMOL/L (ref 3.4–5.3)
PROT SERPL-MCNC: 7.9 G/DL (ref 6.4–8.3)
SODIUM SERPL-SCNC: 140 MMOL/L (ref 135–145)
VIT B12 SERPL-MCNC: 594 PG/ML (ref 232–1245)
VIT D+METAB SERPL-MCNC: 51 NG/ML (ref 20–50)

## 2024-09-04 PROCEDURE — 80053 COMPREHEN METABOLIC PANEL: CPT

## 2024-09-04 PROCEDURE — 36415 COLL VENOUS BLD VENIPUNCTURE: CPT

## 2024-09-04 PROCEDURE — 82306 VITAMIN D 25 HYDROXY: CPT

## 2024-09-04 PROCEDURE — 83036 HEMOGLOBIN GLYCOSYLATED A1C: CPT

## 2024-09-04 PROCEDURE — 82607 VITAMIN B-12: CPT

## 2024-10-05 ENCOUNTER — HEALTH MAINTENANCE LETTER (OUTPATIENT)
Age: 49
End: 2024-10-05

## 2024-12-14 ENCOUNTER — HEALTH MAINTENANCE LETTER (OUTPATIENT)
Age: 49
End: 2024-12-14

## 2025-01-06 NOTE — PROGRESS NOTES
Bariatric Clinic Follow-Up Visit:    Eleanor Elizabeth is a 49 year old  female with Body mass index is 40.94 kg/m .  presenting here today for follow-up on non-surgical efforts for weight loss. Original Intake visit occurred on 9/1/23 with a weight of 267 lbs and BMI of 45.9 and co-morbid type II diabetes.  Peak weight of 282 lbs. Along with diet and behavior changes, she has been using tirzepatide to assist her weight loss goals ramping to efficacy/tolerance for both diabetes and morbid obesity benefits.  See her intake visit notes for details on identified contributors to weight gain in the past. Chart review shows Dietician calculated RMR of 1830kcal/day and protein intake goal of 70-90g/day.    Weight:   Wt Readings from Last 5 Encounters:   01/08/25 105.7 kg (233 lb 1.6 oz)   08/21/24 109.7 kg (241 lb 14.4 oz)   12/07/23 114.3 kg (252 lb)   09/01/23 121.3 kg (267 lb 8 oz)   04/15/23 124.3 kg (274 lb)    pounds      Comorbidities:  Patient Active Problem List   Diagnosis    Lumbar spinal stenosis    Diabetes mellitus, type 2 (H)    BV (bacterial vaginosis)    Hereditary and idiopathic peripheral neuropathy    Pulmonary embolism (H)     Lab Results   Component Value Date    A1C 5.0 09/04/2024    A1C 6.4 09/01/2023    A1C 7.0 04/10/2023     Much improved A1c on Mounjaro and with weight reduction/dietary changes.    Current Outpatient Medications:     acetaminophen (TYLENOL) 325 MG tablet, Take 2 tablets (650 mg) by mouth every 4 hours as needed for other (For optimal non-opioid multimodal pain management to improve pain control.), Disp: 100 tablet, Rfl: 0    aspirin (ASA) 81 MG EC tablet, Take 1 tablet (81 mg) by mouth daily, Disp: 30 tablet, Rfl: 0    Blood Glucose Monitoring Suppl (CONTOUR NEXT ONE) KIT, 1 DEVICE BY NOT IN USE ROUTE . FOR 1 DOSE. USE AS DIRECTED., Disp: , Rfl:     cholecalciferol (VITAMIN D3) 10 mcg (400 units) TABS tablet, Take 10 mcg by mouth daily, Disp: , Rfl:     CONTOUR NEXT TEST test  strip, Use to test bg 1 time daily. Use as directed., Disp: , Rfl:     levonorgestrel (MIRENA) 52 MG (20 mcg/day) IUD, 1 each by Intrauterine route., Disp: , Rfl:     lisinopril (ZESTRIL) 10 MG tablet, Take 1 tablet (10 mg) by mouth daily, Disp: 30 tablet, Rfl: 1    metFORMIN (GLUCOPHAGE XR) 500 MG 24 hr tablet, Take 2 tablets (1,000 mg) by mouth daily (with dinner), Disp: 60 tablet, Rfl: 0    Microlet Lancets MISC, USE 1 EACH TO TEST DAILY., Disp: , Rfl:     MOUNJARO 15 MG/0.5ML SOAJ, 15 mg., Disp: , Rfl:     Multiple Vitamins-Iron (MULTIPLE VITAMIN/IRON OR), Take 1 tablet by mouth daily, Disp: , Rfl:     rosuvastatin (CRESTOR) 10 MG tablet, Take 10 mg by mouth daily, Disp: , Rfl:       Interim: Since our last visit, she has been laid off October, has severance package through February and may be working for the [employer] if she gets her job.  Mounjaro going well.   Working part time this winter at [employer].   Plan:   1.  Diet: for now aiming for 1475-1600kcal/day with 75-90 grams of lean protein while doing your active [employer] job, and if less active, aiming ofr 1400kcal/day and 70-80 grams of lean protein daily.  Hydrate well with water between meals to hit your 70 oz of water daily.  2. Exercise: adding in 3 workouts weekly once at new job.  3. Medication:   Lab Results   Component Value Date    A1C 5.0 09/04/2024    A1C 6.4 09/01/2023    A1C 7.0 04/10/2023     Much improved A1c on Mounjaro and with weight reduction/dietary changes.  4. No labs needed today, A1c much improved.  5. Goals: 44 lbs down from introductory weight and down 49 lbs down from your max weight.       We discussed [facility] Bariatric Basics including:  -eating 3 meals daily  -reviewed metabolic needs for weight loss based on Resting Metabolic Rate  -protein goals supportive of healthy weight loss  -avoiding/limiting calorie containing beverages  -We discussed the importance of restorative sleep and stress management in maintaining a healthy  "weight.  -We discussed the National Weight Control Registry healthy weight maintenance strategies and ways to optimize metabolism.  -We discussed the importance of physical activity including cardiovascular and strength training in maintaining a healthier weight and explored viable options.      Most recent labs:  Lab Results   Component Value Date    WBC 11.4 (H) 09/01/2023    HGB 15.2 09/01/2023    HCT 42.3 09/01/2023    MCV 88 09/01/2023     09/01/2023     Lab Results   Component Value Date    A1C 5.0 09/04/2024    A1C 6.4 09/01/2023    A1C 7.0 04/10/2023       No results found for: \"CHOL\"  No results found for: \"HDL\"  No components found for: \"LDLCALC\"  No results found for: \"TRIG\"  No results found for: \"CHOLHDL\"  Lab Results   Component Value Date    ALT 15 09/04/2024    AST 20 09/04/2024    ALKPHOS 80 09/04/2024     No results found for: \"HGBA1C\"  Lab Results   Component Value Date    B12 594 09/04/2024     No components found for: \"VITDT1\"  No results found for: \"RAI\"  Lab Results   Component Value Date    PTHI 14 (L) 09/01/2023     No results found for: \"ZN\"  Lab Results   Component Value Date    VIB1WB 315 (H) 09/01/2023     Lab Results   Component Value Date    TSH 1.15 09/01/2023     No results found for: \"TEST\"    DIETARY HISTORY  Finding portion control easy now, low temptations. Able to get her protein in. Finally at this 15mg/week dose of Mounjaro she's finding that she has to remember to eat.       PHYSICAL ACTIVITY PATTERNS:  Cardiovascular: [employer]  job since laid off in October  Strength Training: same.     REVIEW OF SYSTEMS  Feels great about her progress. .  PHYSICAL EXAM:  Vitals: /74 (BP Location: Right arm, Patient Position: Sitting, Cuff Size: Adult Large)   Ht 1.607 m (5' 3.27\")   Wt 105.7 kg (233 lb 1.6 oz)   BMI 40.94 kg/m    Weight:   Wt Readings from Last 3 Encounters:   01/08/25 105.7 kg (233 lb 1.6 oz)   08/21/24 109.7 kg (241 lb 14.4 oz)   12/07/23 114.3 kg " (252 lb)         GEN: Pleasant, well groomed, in no acute distress  HEENT:  normal facies .  NECK: No swelling.  HEART: .  LUNGS: No respiratory difficulty noted. No cough. .  ABDOMEN: .  EXTREMITIES: No tremor. ..  NEURO: Alert and Oriented X3, fluent speech. .  SKIN: No visible rashes. .    Interim study results: .      30 minutes spent by me on the date of the encounter doing chart review, history and exam, documentation and further activities per the note   Francisco Fine MD  [facility]  3:52 PM  1/6/2025

## 2025-01-08 ENCOUNTER — OFFICE VISIT (OUTPATIENT)
Dept: SURGERY | Facility: CLINIC | Age: 50
End: 2025-01-08
Payer: COMMERCIAL

## 2025-01-08 VITALS
SYSTOLIC BLOOD PRESSURE: 130 MMHG | HEIGHT: 63 IN | DIASTOLIC BLOOD PRESSURE: 74 MMHG | WEIGHT: 233.1 LBS | BODY MASS INDEX: 41.3 KG/M2

## 2025-01-08 DIAGNOSIS — E66.01 CLASS 3 SEVERE OBESITY DUE TO EXCESS CALORIES WITH SERIOUS COMORBIDITY AND BODY MASS INDEX (BMI) OF 40.0 TO 44.9 IN ADULT (H): Primary | ICD-10-CM

## 2025-01-08 DIAGNOSIS — E66.813 CLASS 3 SEVERE OBESITY DUE TO EXCESS CALORIES WITH SERIOUS COMORBIDITY AND BODY MASS INDEX (BMI) OF 40.0 TO 44.9 IN ADULT (H): Primary | ICD-10-CM

## 2025-01-08 DIAGNOSIS — E11.9 TYPE 2 DIABETES, HBA1C GOAL < 7% (H): ICD-10-CM

## 2025-01-08 DIAGNOSIS — Z87.19 HISTORY OF FATTY INFILTRATION OF LIVER: ICD-10-CM

## 2025-01-08 DIAGNOSIS — Z86.711 HISTORY OF PULMONARY EMBOLISM: ICD-10-CM

## 2025-01-08 PROCEDURE — 99214 OFFICE O/P EST MOD 30 MIN: CPT | Performed by: EMERGENCY MEDICINE

## 2025-01-08 RX ORDER — TIRZEPATIDE 15 MG/.5ML
15 INJECTION, SOLUTION SUBCUTANEOUS
COMMUNITY
Start: 2024-12-23

## 2025-01-08 NOTE — LETTER
1/8/2025      Eleanor Elizabeth  [address] [place] [address]      Dear Colleague,    Thank you for referring your patient, Eleanor Elizabeth, to the [facility] [place]. Please see a copy of my visit note below.    Bariatric Clinic Follow-Up Visit:    Eleanor Elizabeth is a 49 year old  female with Body mass index is 40.94 kg/m .  presenting here today for follow-up on non-surgical efforts for weight loss. Original Intake visit occurred on 9/1/23 with a weight of 267 lbs and BMI of 45.9 and co-morbid type II diabetes.  Peak weight of 282 lbs. Along with diet and behavior changes, she has been using tirzepatide to assist her weight loss goals ramping to efficacy/tolerance for both diabetes and morbid obesity benefits.  See her intake visit notes for details on identified contributors to weight gain in the past. Chart review shows Dietician calculated RMR of 1830kcal/day and protein intake goal of 70-90g/day.    Weight:   Wt Readings from Last 5 Encounters:   01/08/25 105.7 kg (233 lb 1.6 oz)   08/21/24 109.7 kg (241 lb 14.4 oz)   12/07/23 114.3 kg (252 lb)   09/01/23 121.3 kg (267 lb 8 oz)   04/15/23 124.3 kg (274 lb)    pounds      Comorbidities:  Patient Active Problem List   Diagnosis     Lumbar spinal stenosis     Diabetes mellitus, type 2 (H)     BV (bacterial vaginosis)     Hereditary and idiopathic peripheral neuropathy     Pulmonary embolism (H)     Lab Results   Component Value Date    A1C 5.0 09/04/2024    A1C 6.4 09/01/2023    A1C 7.0 04/10/2023     Much improved A1c on Mounjaro and with weight reduction/dietary changes.    Current Outpatient Medications:      acetaminophen (TYLENOL) 325 MG tablet, Take 2 tablets (650 mg) by mouth every 4 hours as needed for other (For optimal non-opioid multimodal pain management to improve pain control.), Disp: 100 tablet, Rfl: 0     aspirin (ASA) 81 MG EC tablet, Take 1 tablet (81 mg) by mouth daily, Disp: 30 tablet,  Rfl: 0     Blood Glucose Monitoring Suppl (CONTOUR NEXT ONE) KIT, 1 DEVICE BY NOT IN USE ROUTE . FOR 1 DOSE. USE AS DIRECTED., Disp: , Rfl:      cholecalciferol (VITAMIN D3) 10 mcg (400 units) TABS tablet, Take 10 mcg by mouth daily, Disp: , Rfl:      CONTOUR NEXT TEST test strip, Use to test bg 1 time daily. Use as directed., Disp: , Rfl:      levonorgestrel (MIRENA) 52 MG (20 mcg/day) IUD, 1 each by Intrauterine route., Disp: , Rfl:      lisinopril (ZESTRIL) 10 MG tablet, Take 1 tablet (10 mg) by mouth daily, Disp: 30 tablet, Rfl: 1     metFORMIN (GLUCOPHAGE XR) 500 MG 24 hr tablet, Take 2 tablets (1,000 mg) by mouth daily (with dinner), Disp: 60 tablet, Rfl: 0     Microlet Lancets MISC, USE 1 EACH TO TEST DAILY., Disp: , Rfl:      MOUNJARO 15 MG/0.5ML SOAJ, 15 mg., Disp: , Rfl:      Multiple Vitamins-Iron (MULTIPLE VITAMIN/IRON OR), Take 1 tablet by mouth daily, Disp: , Rfl:      rosuvastatin (CRESTOR) 10 MG tablet, Take 10 mg by mouth daily, Disp: , Rfl:       Interim: Since our last visit, she has been laid off October, has severance package through February and 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  Mounjaro going well.   Working part time this winter at [employer].   Plan:   1.  Diet: for now aiming for 1475-1600kcal/day with 75-90 grams of lean protein while doing your active [employer] job, and if less active, aiming ofr 1400kcal/day and 70-80 grams of lean protein daily.  Hydrate well with water between meals to hit your 70 oz of water daily.  2. Exercise: adding in 3 workouts weekly once at new job.  3. Medication:   Lab Results   Component Value Date    A1C 5.0 09/04/2024    A1C 6.4 09/01/2023    A1C 7.0 04/10/2023     Much improved A1c on Mounjaro and with weight reduction/dietary changes.  4. No labs needed today, A1c much improved.  5. Goals: 44 lbs down from introductory weight and down 49 lbs down from your max weight.       We discussed [facility] Bariatric Basics including:  -eating 3 meals  "daily  -reviewed metabolic needs for weight loss based on Resting Metabolic Rate  -protein goals supportive of healthy weight loss  -avoiding/limiting calorie containing beverages  -We discussed the importance of restorative sleep and stress management in maintaining a healthy weight.  -We discussed the National Weight Control Registry healthy weight maintenance strategies and ways to optimize metabolism.  -We discussed the importance of physical activity including cardiovascular and strength training in maintaining a healthier weight and explored viable options.      Most recent labs:  Lab Results   Component Value Date    WBC 11.4 (H) 09/01/2023    HGB 15.2 09/01/2023    HCT 42.3 09/01/2023    MCV 88 09/01/2023     09/01/2023     Lab Results   Component Value Date    A1C 5.0 09/04/2024    A1C 6.4 09/01/2023    A1C 7.0 04/10/2023       No results found for: \"CHOL\"  No results found for: \"HDL\"  No components found for: \"LDLCALC\"  No results found for: \"TRIG\"  No results found for: \"CHOLHDL\"  Lab Results   Component Value Date    ALT 15 09/04/2024    AST 20 09/04/2024    ALKPHOS 80 09/04/2024     No results found for: \"HGBA1C\"  Lab Results   Component Value Date    B12 594 09/04/2024     No components found for: \"VITDT1\"  No results found for: \"RAI\"  Lab Results   Component Value Date    PTHI 14 (L) 09/01/2023     No results found for: \"ZN\"  Lab Results   Component Value Date    VIB1WB 315 (H) 09/01/2023     Lab Results   Component Value Date    TSH 1.15 09/01/2023     No results found for: \"TEST\"    DIETARY HISTORY  Finding portion control easy now, low temptations. Able to get her protein in. Finally at this 15mg/week dose of Mounjaro she's finding that she has to remember to eat.       PHYSICAL ACTIVITY PATTERNS:  Cardiovascular: [employer]  job since laid off in October  Strength Training: same.     REVIEW OF SYSTEMS  Feels great about her progress. .  PHYSICAL EXAM:  Vitals: /74 (BP Location: " "Right arm, Patient Position: Sitting, Cuff Size: Adult Large)   Ht 1.607 m (5' 3.27\")   Wt 105.7 kg (233 lb 1.6 oz)   BMI 40.94 kg/m    Weight:   Wt Readings from Last 3 Encounters:   01/08/25 105.7 kg (233 lb 1.6 oz)   08/21/24 109.7 kg (241 lb 14.4 oz)   12/07/23 114.3 kg (252 lb)         GEN: Pleasant, well groomed, in no acute distress  HEENT:  normal facies .  NECK: No swelling.  HEART: .  LUNGS: No respiratory difficulty noted. No cough. .  ABDOMEN: .  EXTREMITIES: No tremor. ..  NEURO: Alert and Oriented X3, fluent speech. .  SKIN: No visible rashes. .    Interim study results: .      30 minutes spent by me on the date of the encounter doing chart review, history and exam, documentation and further activities per the note   Francisco Fine MD  [facility]  3:52 PM  1/6/2025      Again, thank you for allowing me to participate in the care of your patient.        Sincerely,        Francisco Fine MD    Electronically signed"

## 2025-01-08 NOTE — PATIENT INSTRUCTIONS
Plan:   1.  Diet: for now aiming for 1475-1600kcal/day with 75-90 grams of lean protein while doing your active [employer] job, and if less active, aiming ofr 1400kcal/day and 70-80 grams of lean protein daily.  Hydrate well with water between meals to hit your 70 oz of water daily.  2. Exercise: adding in 3 workouts weekly once at new job.  3. Medication:   Lab Results   Component Value Date    A1C 5.0 09/04/2024    A1C 6.4 09/01/2023    A1C 7.0 04/10/2023     Much improved A1c on Mounjaro and with weight reduction/dietary changes.  4. No labs needed today, A1c much improved.  5. Goals: 44 lbs down from introductory weight and down 49 lbs down from your max weight.         LEAN PROTEIN SOURCES  Getting 20-30 grams of protein, 3 meals daily, is appropriate for most people, some need more but more than about 40 grams per meal is not useful.  General rule is drinking one ounce of water per gram of protein eaten over the course of the day:  70 grams of protein each day, drink 70 oz of water.  Protein Source Portion Calories Grams of Protein                           Nonfat, plain Greek yogurt    (10 grams sugar or less) 3/4 cup (6 oz)  12-17   Light Yogurt (10 grams sugar or less) 3/4 cup (6 oz)  6-8   Protein Shake 1 shake 110-180 15-30   Skim/1% Milk or lactose-free milk 1 cup ( 8 oz)  8   Plain or light, flavored soymilk 1 cup  7-8   Plain or light, hemp milk 1 cup 110 6   Fat Free or 1% Cottage Cheese 1/2 cup 90 15   Part skim ricotta cheese 1/2 cup 100 14   Part skim or reduced fat cheese slices 1 ounce 65-80 8     Mozzarella String Cheese 1 80 8   Canned tuna, chicken, crab or salmon  (canned in water)  1/2 cup 100 15-20   White fish (broiled, grilled, baked) 3 ounces 100 21   Roff/Tuna (broiled, grilled, baked) 3 ounces 150-180 21   Shrimp, Scallops, Lobster, Crab 3 ounces 100 21   Pork loin, Pork Tenderloin 3 ounces 150 21   Boneless, skinless chicken /turkey breast                           (broiled, grilled, baked) 3 ounces 120 21   [place], [place], [place], and Venison 3 ounces 120 21   Lean cuts of red meat and pork (sirloin,   round, tenderloin, flank, ground 93%-96%) 3 ounces 170 21   Lean or Extra Lean Ground Turkey 1/2 cup 150 20   90-95% Lean [place] Burger 1 joy 140-180 21   Low-fat casserole with lean meat 3/4 cup 200 17   Luncheon Meats                                                        (turkey, lean ham, roast beef, chicken) 3 ounces 100 21   Egg (boiled, poached, scrambled) 1 Egg 60 7   Egg Substitute 1/2 cup 70 10   Nuts (limit to 1 serving per day)  3 Tbsp. 150 7   Nut [place] (peanut, almond)  Limit to 1 serving or less daily 1 Tbsp. 90 4   Soy Burger (varies) 1  15   Garbanzo, Black, [place] Beans 1/2 cup 110 7   Refried Beans 1/2 cup 100 7   Kidney and Lima beans 1/2 cup 110 7   Tempeh 3 oz 175 18   Vegan crumbles 1/2 cup 100 14   Tofu 1/2 cup 110 14   Chili (beans and extra lean beef or turkey) 1 cup 200 23   Lentil Stew/Soup 1 cup 150 12   Black Bean Soup 1 cup 175 12           Example Meal Plan for a 4937-1739 Calorie Diet:    In order to fuel your weight loss properly and avoid hunger-induced overeating later in the day, for your height and weight, you will enjoy the most success by following the diet below or similar with adjustments based on your particular tastes and preferences.  Exercise may influence speed, amount of weight loss further.     I recommend getting into a meal routine and keeping it similar day to day in the beginning so you don t have to think too hard about what you re going to make/eat.  Keep snacks healthy, ideally containing protein and some vegetables.  Non-processed food is preferable to packaged items.  Eat at least a few crunchy green vegetables if having a snack, which should be 2-3 hours after your mealtimes(prepare these ahead of time for ease of use).  Drink 64 oz -80 oz of water daily for most, some of you will need more and we'll discuss it  at your visit if that is the case.      When changing our diet,  we can often mistake thirst for hunger or just have some distracted eating habits that we need to break free from ('bored/mindless eating', screen time,work, driving,etc).  A glass of water and reconsideration of our hunger is often all that is needed.  Having the urge is not the problem, but watching it pass by without acting on it is the goal.    If you re having hunger problems, add a protein drink/snack to your morning hours or afternoon snack with at least 20grams of protein and not too much sugar (under 10g).  A carton of higher protein/low sugar yogurt can work as well.  If the urge to snack is overwhelming and not satiated, try going for a 10 minute walk/exercise, come home and drink a glass of water and if still hungry, have a  calorie snack (handful of raw/sprouted nuts, veggies and string cheese, protein bar, etc).  Savor it.    It is better to have a large breakfast, a moderate lunch and a smaller dinner to fuel your day.  People lose 10-15% more weight during their weight loss season with this strategy. Optimizing your protein intake at each meal will further keep you more satisfied while eating less food overall.  Getting exercise in early has also been shown to offer the best results (before breakfast ideally but anytime is the right time to exercise if that is not an option for you).    To make sure you re getting adequate vitamins and minerals during weight loss, I recommend one complete multivitamin a day of your choice.  Consider a probiotic and taking some vitamin D 2000 IU daily.    Let supper be your last meal of the day and ideally try to have at least 12 hours between supper and breakfast the next day to tap into some beneficial overnight fasting dynamics.  Midnight snacks need to go away. Water in the evening is fine, unsweetened, non caffeinated herbal tea is helpful as well.  Consolidating your meals within a 8-12 hour  period of your day will help tap into these additional metabolic benefits and tends to keep your appetite up for breakfast, further helping to stay on track.  For most of my patients, I don't recommend an intermittent fasting style diet (many find it hard to fit in their lifestyle) but an overnight fast is very doable for most patients and helps regulate our hunger drives a little better.  This makes it very important to nail good intake at all three meals to feel satisfied/energized and still lose weight.      If evening snacking desires are high, consider a glass of fiber supplement for some additional fullness (metamucil or similar). Most of us don't get the 25-30 grams per day of fiber that promotes good gut health/satiety.  Benefiber, metamucil, citrucel are reasonable/affordable options for most people.  Inulin, chicory, psyllium husk are reasonable options but start slow and low in the dose to avoid gas/bloating until your gut gets acclimated (ramping up to 5-10 grams per day of supplemental fiber after 3-4 weeks if needed).      Example Meal Plan:  Breakfast: 450-475 Calories  1 egg cooked on low in olive oil:   calories.  5oz Greek Yogurt (Fage plain classic: ~150 jennifer)  Handful of Berries of your choice (about a calorie per berry or 20-40cal per handful)    cup(cooked) of  old fashioned oatmeal or 1/2 cup(cooked) steel cut oats. (150 jennifer)  Sprinkle amount of brown sugar and a pat of butter. (40 jennifer)  Glass of  Water  Black coffee or unsweetened Tea (0calories).      2-3 hours Later Snack: (195 calories).  Glass of water  One string Cheese (80 calories) or 4 oz creamed cottage cheese (115 calories) with  Crunchy Celery sticks (less than 10 calories per large stalk) 2 stalks. (20 calories)    of a  Large Banana or   of a Large Apple (60 calories):  eat second half at lunch or afternoon snack.     Lunch:300 -350 calories   Chicken Breast  (baked/broiled/roasted/grilled)  4-6 oz.  (125-180 jennifer), BBQ  sauce/hot sauce/mustard/seasoning is free. Just use a reasonable amount. Or a can of tuna with 1 tablespoon mayonnaise.  Salad: lettuce, any other veggies (cucumbers, green peppers/celery you like and a small drizzle of dressing to just flavor.  Go as big on the veggies as you like,  as they are practically calorie free.   A whole, 8 inch cucumber is 45 calories, a whole green pepper is 23 calories, a stalk of celery is 9 calories.  [place] Dressing is 60 calories per tablespoon..so moderate your desired dressing or do a drizzle of olive oil and splash of balsamic vinegar on top,  Total calories unlikely to be over 150 even with dressing.  Glass of Water.    Option for lunch is meal replacement protein drink/smoothie.  Need at least 20 grams of protein and eat the rest of your apple/banana from the morning snack.      Afternoon Snack: 150-200 calories   Cheese Stick or cottage cheese again  and a fresh fruit OR  Granola Bar (protein Bar acceptable if under 200 calories OR  Homemade smoothies:  8oz skim milk,  a handful of berries (fresh or frozen and a serving of protein powder such as BiPro or Lety sWhey for example.  If you don't like dairy, make with 8oz water, one small banana, handful of berries and the protein powder, add any veggies you want as well:  roughly 200 calories.   Glass of Water    Dinner: 325 calories  4oz of fresh, [place] salmon.  Broiled (salt/pepper/dill) for about 8-8.5 minutes (200calories) or  4oz filet mignon steak or sirloin steak  Salad or vegetable sautéed lightly in olive oil or   Broccoli 1.5  cups chopped and steamed  or micro-waved in a little water (75 calories)  Glass of Water,    Cup of herbal tea (unsweetened, caffeine free)      Herbs and seasonings are encouraged to flavor your foods/vegetables.  Make your food delicious.      Tips for Success:  1.  Prepare proteins ahead of time (broil chicken breasts in bulk so you can grab and go), steel cut oats/lentils can be  "stored in casserole dish/bowl in the fridge for quick scoop in the morning and rewarm in microwave, make use of crock pot recipes (watch salt content).  Making meals that cover 3-4 future meals is an easy way to stay on track.  2.  Drink a 8-12 oz glass of water every 2-3 hours when awake.  We often mistake hunger for thirst, especially when losing weight.  3. Remember your Reward and Motivation when things get hard.  4.  Weigh yourself every morning and record, you'll stay on track better and learn how our biorhythms, diet and elimination patterns show up on the scale. Don't worry about 1 or 2 day patterns, but when on track you'll notice good trend downward of weight over 3-4 day segments.  Plateaus tend to resolve after 4-8 days in most cases if you stay consistent with your plan.  These are natural and part of weight loss, even if you're perfect with your plan execution.  5. Call if problems/concerns.  [employer] is a great tool to stay in touch and provide weekly outside accountability. Check in with questions or if you want to brag.  6.  Find a handful of meals/foods that keep you on track and feeling good and get into a routine that is sustainable for you.  It's OK to have a routine that works for you.  7.  Consider taking a complete multivitamin just to make sure all micronutrients are adequate during weight loss.  8. If losing hair/brittle nails it usually means you are not taking enough protein.  Minimum goal is 60 grams daily of protein for smaller women, 80 grams a day for men. Consider taking Biotin as supplement or a \"Hair and Nail\" multivitamin.  On-the-Go Breakfast Ideas  As of 2015, the latest research shows what a huge impact eating breakfast has on losing weight and feeling your best. People lose more weight when they make breakfast their biggest meal of the day compared to Dinner, but even if you cannot go to that degree, getting a breakfast that has at least 20 grams of protein and even a moderate " amount of fat is ideal for maintaining good energy through the day and limits overeating in the evening hours.  The following are some quick and easy suggestions for at least getting something of substance into your body in the morning.  Enjoy!    Eating breakfast within 90 minutes of waking up is an important part of taking care of your body on a restricted calorie diet plan.  After sleeping for hours, your body is in need of fuel.  An ideal breakfast is a combination of protein, whole-grain carbohydrates, or fruit.  Here s why:    -Protein digests very slowly in the body, helping you feel more satisfied.  -Whole grains provide dietary fiber, which also digests slowly and helps keep your gut clean.  -Fruit is a great source of vitamins, minerals, and fiber.     Each one of these breakfast combinations has between 200-300 calories and 15-20 grams of protein.  Feel free to mix and match!    Bone Broth (chicken bone broth or beef bone broth) is a great way to boost protein content. 8oz of bone broth will typically have 9-12grams of protein for 40kcal of energy.    Protein: Choose  -1/2 cup low-fat cottage cheese  -2 hard boiled eggs , or one cooked in olive oil (low/slow heat).  -1 low fat string cheese stick  -1 [employer] natural peanut butter  -[employer] vegetarian sausage joy (found in freezer section)  -1 slice lowfat cheese  -6 oz 2% or lowfat Greek yogurt, such as Fage or Oikos.    PLUS    Whole Grains:  Choose   -1 whole wheat English muffin  -1 whole wheat jona, half  -1/2 Fiber One frozen muffin, thawed  -1/2 Fiber One toaster pastry  -1 whole wheat bagel thin  -1/2 cup Kashi cereal  -1 Kashi waffle (or other whole grain high-fiber waffle)  Aim for whole grain/sprouted breads with at least 3g of fiber/slice if having bread. [place] is one such brand.    OR    Fruit: Choose  -1/3 cup blueberries  -1/2 banana (or a plantain- similar to a banana, yet smaller)  -1/2 cup cantaloupe cubes  -1 small  apple  -1 small orange  -1/2 cup strawberries  -handful raspberries/blackberries (each berry is about 1 calorie).    *Adapted from [employer], Fall 20    Ten Breakfasts Under 250 calories    Ideally, getting between 350-600 calories  (depending on starting height and weight)for breakfast is ideal for avoiding hunger later in the day, adjust/add to the following accordingly:    One- 250 calories, 8.5 g protein  1 slice whole-grain toast   1 Tbsp peanut butter    banana    Two- 250 calories, 8 g protein    cup nonfat/lowfat yogurt  1/3rd cup diced no-sugar peaches  1/3rd cup cereal (like Special K, Cheerios, or bran flakes)    Three- 250 calories, 25 g protein  1 egg scrambled with 1 oz skim milk    cup shredded cheddar    whole grain English muffin  1 oz [place] hampton  1 tsp margarine spread    Four- 225 calories, 25 g protein  1/2 cup Kashi Go-Lean cereal    cup skim milk mixed with 1 scoop Bariatric Advantage protein powder    cup no-sugar diced pears    Five- 250 calories, 20 g protein    cup oatmeal prepared with skim milk, 1 scoop protein powder, and sugar-free maple syrup    Six- 200 calories, 5 g protein  1 whole grain waffle, toasted  1 tablespoon creamy peanut or almond butter    Seven-  250 calories, 19 g protein  Breakfast sandwich: 1 slice whole grain toast, cut in half.  Add 1 scrambled egg and one slice cheddar  cheese.    Eight-  250 calories, 15 g protein  2 eggs scrambled with 1/3 cup frozen spinach (heat before adding to eggs) and 2 tablespoons low fat cream cheese.    Nine-  150 calories, 15 g protein  2/3rd cup cottage cheese    cup cantaloupe    Ten- 200 calories, 20 g protein  Fruit smoothie made with 4 oz. nonfat Greek yogurt,   cup berries, 1 scoop protein powder, and 4 oz skim milk.    Ten Lunches Under 250 Calories    Aim for lunch to be around 300-400 calories a day when trying to lose weight and get that protein in!    One- 200 calories, 11 g protein  1/3 cup tuna salad made with  light harris on 1 slice whole grain bread  1 small peeled apple    Two- 250 calories, 16 g protein  1/3 cup lowfat cottage cheese    cup cooked green beans    small fruit cocktail (in natural juice)    Three- 200 calories, 11 g protein    grilled cheese sandwich on whole grain bread with lowfat cheese  2/3rd cup of tomato soup    Four- 250 calories, 22 g protein  Deli wrap: 1 oz sliced turkey, 1 oz sliced ham, 1 oz sliced chicken rolled up with 1 slice low-fat cheese  1 small orange    Five- 250 calories, 28 g protein  2/3rd cup chili with 1 oz shredded cheese  4 saltine crackers    Six- 250 calories, 22 g protein  1 cup fresh spinach with 2 oz chicken, 1/3rd cup mandarin oranges, and 2 tablespoons sliced almonds with 1 tablespoon  vinaigrette dressing    Seven- 200 calories, 11 g protein  1 Tbsp sugar-free preserves and 1 Tbsp peanut butter on 1 slice whole grain toast    cup nonfat/lowfat Greek yogurt    Eight- 250 calories, 18 g protein  1 small soft-shell chicken taco with 1 oz shredded cheese, lettuce, tomato, salsa, and 1 Tbsp light sour cream    cup black beans    Nine- 225 calories, 13 g protein  2 ounces baked chicken  1/4 cup mashed potatoes    cup green beans    Ten- 200 calories, 21 g protein  Deli jona: 2 oz roast beef or other deli meat with 1 tsp Geo mayonnaise and sliced tomato, onion, and lettuce  1/3rd cup cottage cheese      Ten Dinners Under 300 calories    If you're eating a large breakfast and medium lunch, keep dinner small.  300-400 calories is ideal for most people depending on their caloric needs.    One- 300 calories, 12 g protein  1-inch thick slice of turkey meatloaf    cup baked butternut squash    Two- 200 calories, 9 g protein  Bread-less BLT: 3 slices turkey hampton, sliced tomato, wrapped in a large lettuce leaf    cup peeled fruit    Three- 275 calories, 36 g protein  3 oz roasted chicken    cup cooked broccoli    cup shredded cheddar cheese    cup unsweetened applesauce    Four- 200  calories, 25 g protein  3 oz baked tilapia  1/3rd cup cooked carrots    cup yogurt    Five- 250 calories, 20 g protein  Grilled ham  n  Swiss: spread 2 tsp ghee or butter on 1 slice of whole grain bread.  Cut bread in half, layer 2 oz deli ham with 1 piece of Swiss cheese and grill until cheese is melted.    cup cooked vegetables    Six- 250 calories, 18 g protein  Vegetarian cheeseburger: 1 [place] cheeseburger topped with lettuce, onion, tomato, and ketchup/mustard    cup sweet potato fries    Seven- 250 calories, 18 g protein  Pork pot roast: 2 oz roasted pork loin, 1/3rd cup roasted carrots,   medium potato, cooked with   cup gravy    Eight- 330 calories, 25 g protein  2 oz meatballs (about 2 small meatballs)    cup spaghetti sauce  1/2 piece toast topped with 1 tsp ghee or butterand topped with garlic powder, toasted in oven    Nine- 250 calories, 16 g protein  Mexican pizza: one 8  corn tortilla topped with 2 oz chicken,   cup salsa, 2 tablespoons black beans, 2 tablespoons shredded cheese.  Bake until cheese is melted.    Ten- 250 calories, 22 g protein  Shrimp stir-layton: 3 oz cooked shrimp, 1/6th onion,   pepper,   cup chopped carrots sautéed in 1 tablespoon olive oil, topped with 2 tablespoons stir layton sauce and a pinch of sesame seeds        150 Calories or Less Snack Ideas   1 hardboiled egg with   cup berries  1 small apple with 1 hardboiled egg  10 almonds with   cup berries  2 clementines with 1 light string cheese  1 light string cheese with   sliced apple  1 light string cheese wrapped in 2 slices of turkey  4 100% whole wheat crackers (e.g. Triscuit) with 1 light string cheese    c. cottage cheese with   cup fruit and 1 Tbsp sunflower seeds     cup cottage cheese with   of an avocado     can tuna fish with 1 cup sliced cucumbers     cup roasted garbanzo beans with paprika and cayenne pepper    baked sweet potato with   cup chili beans or   cup cottage cheese  2 oz. nitrate free turkey slices with 1 cup  carrots  1 container (6 oz) of low sugar (less than 10 grams of sugar) greek yogurt   3 Tablespoons of hummus with 1 cup sliced bell peppers   2 Tablespoons of hummus with 15 baby carrots  4 Tablespoons ranch dip made with plain Greek Yogurt and 3 mini cucumbers  1/4 cup nuts (any kind)  1 Tablespoon peanut butter with 1 stalk celery   1 dill pickle wrapped in 1-2 slices of deli ham with 1 tsp of mayonnaise/mustard.    Zepbound/Mounjaro (Tirzepatide) is a very effective satiety boosting appetite suppressant that elevates satiety hormones GLP1 and GIP. It needs to be ramped up slowly to be tolerated adequately.  It helps release insulin in response to food when blood sugar runs higher than normal and is very helpful for diabetics in managing blood sugar levels with low risks for any low blood sugars.  About 1/10 people will not tolerate this medication. Each month, you move up to a higher dose until eventually reaching the 10mg/week dose if tolerated with further ramping to follow if needed. If intolerant or severe side effects, a dose decrease would be wise, so keep me posted if not tolerated the ramping well. This may be a longer term medication based on individual needs/physiology and appetite control.     Injections can be given after cleansing the skin with alcohol prep pad or swab (available OTC).     Stop Zepbound if severe abdominal pain/vomiting/rash/throat swelling or constant nausea that prevents adequate food/water intake. Stop 2-3 weeks prior to any planned general anesthesia surgeries to reduce risk for something called a post operative ileus.     Gallstones can occur in about 1% of patients on this medication so update me if increase right upper abdominal pain after eating.     Start meals with protein first, separate beverages from meals by 20 minutes and work hard in between meals to get your 64-75 oz of water daily to reduce risks for severe constipation. Consider a fiber supplement like powdered  psyllium husk in 12 oz water each night, stool softerners as needed and Miralax or milk of Magnesia if more than 3 days have passed without a Bowel Movement. Some other options include:  For Prevention and Treatment of Constipation when on Semaglutide     From least aggressive to most aggressive:     Move: Wallking is essential - the more we move, the more our bowels move  Water: Drink water - 64oz or more a day  Go when you need to go. Don't wait. The longer you wait, the harder it gets.  Fiber: Fruit, raw veggies, nuts, whole grains, prune each night, flax/sheri seeds added into meals can all be helpful.   Stool Softeners: if constipation is mild and for maintenance  Gentle laxatives: Miralax, senokot, dulcolax , Smooth move tea as needed     More aggressive (and typically won't get to this point)  Milk of Magnesia  Mag Citrate (what you drink before a colonoscopy)  Suppositories  Enema      Check out [employer] for patient resources.  If you have weekends off, I recommend dosing Friday evenings.     Some people starve on this medication if not mindful about food intake. I recommend starting meals with the protein part of your meal first, chew thoroughly and separate beverages from meals by about 20 minutes to make sure you get your nourishment in first. Include vegetables/complex carbohydrates and unsaturated fat as part of your balanced diet but group these at the end of the meal, after your protein is mostly gone. Satiety will kick in too early if drinking too much with meals and under-nourishment can result.     It's not a bad idea to take a complete multivitamin most days of the week if using this medication. Lower iron content tends to be less constipating.     Adequate hydration is essential for feeling your best, efficient fat burning, waste elimination and constipation prevention. For those without fluid restrictions due to other disease, the goal is at least one ounce of water per gram of protein  consumed with a  minimum of 64oz/day goal.     Pancreatitis is a very rare but potentially serious side effect. Stop Zepbound if severe mid abdominal pain/burning in nature or if unable to eat/drink due to severe nausea/discomfort.   People with strong history of pancreatitis without clear cause should stay clear of this medication as should those planning to get pregnant, those with strong personal or family history for medullary thyroid cancer or Multiple Endocrine Neoplasia (rare).     Stop Zepbound at least 2 weeks prior to any planned surgery.  Stop until fully recovered if unexpected/emergent surgery is needed with anticipation that re-ramping will be needed if off longer than 14-16 days since last dose.    Kind Regards,  Francisco Fine MD  [facility]

## 2025-03-16 ENCOUNTER — HEALTH MAINTENANCE LETTER (OUTPATIENT)
Age: 50
End: 2025-03-16

## 2025-03-22 ENCOUNTER — LAB (OUTPATIENT)
Dept: LAB | Facility: CLINIC | Age: 50
End: 2025-03-22
Payer: COMMERCIAL

## 2025-03-22 DIAGNOSIS — E66.813 CLASS 3 SEVERE OBESITY DUE TO EXCESS CALORIES WITH SERIOUS COMORBIDITY AND BODY MASS INDEX (BMI) OF 45.0 TO 49.9 IN ADULT (H): ICD-10-CM

## 2025-03-22 DIAGNOSIS — E11.9 TYPE 2 DIABETES, HBA1C GOAL < 7% (H): ICD-10-CM

## 2025-03-22 DIAGNOSIS — E66.01 CLASS 3 SEVERE OBESITY DUE TO EXCESS CALORIES WITH SERIOUS COMORBIDITY AND BODY MASS INDEX (BMI) OF 45.0 TO 49.9 IN ADULT (H): ICD-10-CM

## 2025-03-22 LAB
ALBUMIN SERPL BCG-MCNC: 4.1 G/DL (ref 3.5–5.2)
ALP SERPL-CCNC: 88 U/L (ref 40–150)
ALT SERPL W P-5'-P-CCNC: 21 U/L (ref 0–50)
ANION GAP SERPL CALCULATED.3IONS-SCNC: 10 MMOL/L (ref 7–15)
AST SERPL W P-5'-P-CCNC: 23 U/L (ref 0–45)
BILIRUB SERPL-MCNC: 0.7 MG/DL
BUN SERPL-MCNC: 19.3 MG/DL (ref 6–20)
CALCIUM SERPL-MCNC: 10.1 MG/DL (ref 8.8–10.4)
CHLORIDE SERPL-SCNC: 105 MMOL/L (ref 98–107)
CREAT SERPL-MCNC: 0.65 MG/DL (ref 0.51–0.95)
EGFRCR SERPLBLD CKD-EPI 2021: >90 ML/MIN/1.73M2
EST. AVERAGE GLUCOSE BLD GHB EST-MCNC: 100 MG/DL
GLUCOSE SERPL-MCNC: 89 MG/DL (ref 70–99)
HBA1C MFR BLD: 5.1 % (ref 0–5.6)
HCO3 SERPL-SCNC: 25 MMOL/L (ref 22–29)
POTASSIUM SERPL-SCNC: 4.3 MMOL/L (ref 3.4–5.3)
PROT SERPL-MCNC: 7.7 G/DL (ref 6.4–8.3)
SODIUM SERPL-SCNC: 140 MMOL/L (ref 135–145)
VIT D+METAB SERPL-MCNC: 42 NG/ML (ref 20–50)

## 2025-03-22 PROCEDURE — 80053 COMPREHEN METABOLIC PANEL: CPT

## 2025-03-22 PROCEDURE — 82306 VITAMIN D 25 HYDROXY: CPT

## 2025-03-22 PROCEDURE — 83036 HEMOGLOBIN GLYCOSYLATED A1C: CPT

## 2025-03-22 PROCEDURE — 36415 COLL VENOUS BLD VENIPUNCTURE: CPT

## 2025-06-24 ENCOUNTER — VIRTUAL VISIT (OUTPATIENT)
Dept: SURGERY | Facility: CLINIC | Age: 50
End: 2025-06-24
Payer: COMMERCIAL

## 2025-06-24 DIAGNOSIS — E66.813 OBESITY, CLASS III, BMI 40-49.9 (MORBID OBESITY) (H): Primary | ICD-10-CM

## 2025-06-24 PROCEDURE — 97803 MED NUTRITION INDIV SUBSEQ: CPT | Mod: 93

## 2025-06-24 NOTE — PROGRESS NOTES
Eleanor Elizabeth is a 50 year old who is being evaluated via a billable telephone visit.      How would you like to obtain your AVS? [facility]        Medical  Weight Loss Follow-Up Diet Evaluation  Assessment:  Eleanor is presenting today for a follow up weight management nutrition consultation.  This patient has had an initial appointment and was referred by Dr. Fine for MNT as treatment for Morbid obesity which is impacting her type 2 DM, and pain.      Weight loss medication: Mounjaro 12.5mg. Metformin   Pt's weight is: 241 lbs   Initial weight: 267 lbs   286 in April 2023  Weight change: 26 lbs down from initial        1/8/2025     2:05 PM   Changes and Difficulties   I have made the following changes to my activity/exercise since my last visit: More walking     BMI: 42.11  Ideal body weight: 54.7 kg (120 lb 9.5 oz)  Adjusted ideal body weight: 81.4 kg (179 lb 5.7 oz)    Estimated RMR ([place]-St Jeor equation):  1740 kcals x 1.2 (sedentary) = 2090 kcals (for weight maintenance)  Recommended Protein Intake:  grams of protein/day  Patient Active Problem List:  Patient Active Problem List   Diagnosis    Lumbar spinal stenosis    Diabetes mellitus, type 2 (H)    BV (bacterial vaginosis)    Hereditary and idiopathic peripheral neuropathy    Pulmonary embolism (H)     Diabetes: Yes, type 2 - on Metformin, Mounjaro (Remission?)    Hemoglobin A1C   Date Value Ref Range Status   03/22/2025 5.1 0.0 - 5.6 % Final     Comment:     Normal <5.7%   Prediabetes 5.7-6.4%    Diabetes 6.5% or higher     Note: Adopted from ADA consensus guidelines.         Progress on goals from last visit: Patient is re establishing RD visits. Reports she had some weight gain (+12 lbs) since our last visit (1 year ago). Thinks she is in perimenopause. Patient is frustrated with her weight gain as she is unsure of why this happened. Noted her appetite is well controlled, she has not had any binge eating episodes. RD and patient talked  through nutrition lable reading looking at total fats and total sugars ect.  + most recent labs look good.   + no physical activity routine at this time           Dietary Recall:  Breakfast: slim fast with protein powder with 1% milk with banana or mandarin orange   Lunch: leftovers (shredded chicken with bbq sauce)  Dinner: leftover omelet from restaurant   Typical snacks: 3 oz turkey breast and hard boiled egg OR vegetables (snap peas, cucumbers, broccoli)  Eating out: none   Beverages:   Water, with crystal light 120+oz+   Exercise: no routine at this time. Will plan to ride her elliptical again   Nutrition Diagnosis:    Morbid obesity related to nutrition knowledge defecit as evidence by patient subjective report and BMI of 42.11 kg/m2          Intervention:  Food and/or nutrient delivery: consistency with meals and protein focus. Encouraged patient to increase fiber as she is increasing her protein intake.   Nutrition counseling: goat setting, continued encouragement and support. Encouraged  patient to incorporate a routine with physical activity at least 3-4x per week.    Monitoring/Evaluation:    Goals:  1,500kcal, 180-190g carbs/day, 80-100g protein daily  Continue with activity goals/walking   Pair all protein sources from meals or snacks with a fiber source     Patient to follow up in 2 month(s) with bariatrician and PRN month(s) with JULIANO        Virtual Visit Details    Type of service:  Telephone Visit   Phone call duration: 35 minutes     Originating Location (pt. Location): Home      Distant Location (provider location):  Off-site        Pau Mensah RD

## 2025-06-24 NOTE — LETTER
6/24/2025      Eleanor Elizabeth  [address] [facility] [address]      Dear Colleague,    Thank you for referring your patient, Eleanor Elizabeth, to the [facility] [place]. Please see a copy of my visit note below.    Eleanor Elizabeth is a 50 year old who is being evaluated via a billable telephone visit.      How would you like to obtain your AVS? [facility]        Medical  Weight Loss Follow-Up Diet Evaluation  Assessment:  Eleanor is presenting today for a follow up weight management nutrition consultation.  This patient has had an initial appointment and was referred by Dr. Fine for MNT as treatment for Morbid obesity which is impacting her type 2 DM, and pain.      Weight loss medication: Mounjaro 12.5mg. Metformin   Pt's weight is: 241 lbs   Initial weight: 267 lbs   286 in April 2023  Weight change: 26 lbs down from initial        1/8/2025     2:05 PM   Changes and Difficulties   I have made the following changes to my activity/exercise since my last visit: More walking     BMI: 42.11  Ideal body weight: 54.7 kg (120 lb 9.5 oz)  Adjusted ideal body weight: 81.4 kg (179 lb 5.7 oz)    Estimated RMR ([place]-St Jeor equation):  1740 kcals x 1.2 (sedentary) = 2090 kcals (for weight maintenance)  Recommended Protein Intake:  grams of protein/day  Patient Active Problem List:  Patient Active Problem List   Diagnosis     Lumbar spinal stenosis     Diabetes mellitus, type 2 (H)     BV (bacterial vaginosis)     Hereditary and idiopathic peripheral neuropathy     Pulmonary embolism (H)     Diabetes: Yes, type 2 - on Metformin, Mounjaro (Remission?)    Hemoglobin A1C   Date Value Ref Range Status   03/22/2025 5.1 0.0 - 5.6 % Final     Comment:     Normal <5.7%   Prediabetes 5.7-6.4%    Diabetes 6.5% or higher     Note: Adopted from ADA consensus guidelines.         Progress on goals from last visit: Patient is re establishing RD visits. Reports she had some  weight gain (+12 lbs) since our last visit (1 year ago). Thinks she is in perimenopause. Patient is frustrated with her weight gain as she is unsure of why this happened. Noted her appetite is well controlled, she has not had any binge eating episodes. RD and patient talked through nutrition lable reading looking at total fats and total sugars ect.  + most recent labs look good.   + no physical activity routine at this time           Dietary Recall:  Breakfast: slim fast with protein powder with 1% milk with banana or mandarin orange   Lunch: leftovers (shredded chicken with bbq sauce)  Dinner: leftover omelet from restaurant   Typical snacks: 3 oz turkey breast and hard boiled egg OR vegetables (snap peas, cucumbers, broccoli)  Eating out: none   Beverages:   Water, with crystal light 120+oz+   Exercise: no routine at this time. Will plan to ride her elliptical again   Nutrition Diagnosis:    Morbid obesity related to nutrition knowledge defecit as evidence by patient subjective report and BMI of 42.11 kg/m2          Intervention:  Food and/or nutrient delivery: consistency with meals and protein focus. Encouraged patient to increase fiber as she is increasing her protein intake.   Nutrition counseling: goat setting, continued encouragement and support. Encouraged  patient to incorporate a routine with physical activity at least 3-4x per week.    Monitoring/Evaluation:    Goals:  1,500kcal, 180-190g carbs/day, 80-100g protein daily  Continue with activity goals/walking   Pair all protein sources from meals or snacks with a fiber source     Patient to follow up in 2 month(s) with bariatrician and PRN month(s) with JULIANO        Virtual Visit Details    Type of service:  Telephone Visit   Phone call duration: 35 minutes     Originating Location (pt. Location): Home      Distant Location (provider location):  Off-site        Pau Mensah RD         Again, thank you for allowing me to participate in the care of your  patient.        Sincerely,        Pau Haynes RD    Electronically signed

## 2025-06-24 NOTE — PATIENT INSTRUCTIONS
FIBER    Goal is 25-30g per day  +increase slowly 2-3g every few days to avoid constipation  +drink plenty of water when increasing fiber intake    Aim for the servings in each category daily:    Nuts/Seeds 1oz daily=1/4 cup, 2 Tbsp or small handful  -nuts  -hemp hearts  -sheri  -flax  -nut butters    Vegetables 4 servings daily= 1 cup raw or 1/2 cup cooked  -broccoli  -onions  -asparagus  -beets  -radishes  -cauliflower  -peppers  -jicama    Fruits 1 serving daily= 1-1.5 cup  -apples  -pears  -fig  -dates (2)  -kiwi  -slightly green banana  -peach  -berries (blackberries=9g fiber per cup!)    Whole grains and beans, 1 cup daily  -black beans  -kidney beans  -garbanzo beans  -lentils  -quinoa  -edamame  -brown rice  -wild rice  -farro  -bulgur  -whole wheat        The Anti-Inflammatory Diet    What is inflammation?     Inflammation is the body's natural way to respond to an injury or exposure, defending against viruses and bacteria and repairing damaged tissue. With this response, the body will increase blood flow to the area in need of healing and generate the signal of pain that something is wrong. Unfortunately, this signaling over a long-term period can lead to chronic inflammation. Chronic inflammation can occur when the immune system attacks healthy cells causing autoimmune diseases, such as rheumatoid arthritis. Other causes of chronic inflammation include persistent injury or infection.     What are some conditions associated with inflammation?     Condition   Reason   Arthritis Inflammation of the joints, most commonly found in rheumatoid arthritis and psoriatic arthritis. The inflammation of the joints can cause irritation leading to wearing down of the cartilage. This can cause soreness, stiffness and sometimes swelling.   Asthma Inflammation is present in the bronchial tubes of persons with asthma even when they feel well and when their breathing is normal. The cause of this chronic inflammation is not  known, although in many instances it has the appearance of an allergic type of reaction.   Atherosclerosis Fat accumulation on arterial walls can cause damage. As the process continues, the cycle of inflammatory response intensifies, resulting in plaque instability and increasing the risk of aneurysm, stroke, or heart attack.   Cancer Chronic inflammation, infection, and tissue damage are associated with an increased risk of many types of cancer   Type II Diabetes Abdominal obesity is believed to be the source of the chronic inflammation that accompanies type 2 DM.    Irritable Bowel Syndrome (IBS) There can be low-grade inflammation in digestive tract tissue in some IBS patients. Research has shown that some individuals who experience this chronic low-grade inflammation without ever experiencing a clear-cut case of gastroenteritis.   Obesity Abdominal obesity is associated with chronic low-grade inflammation, which appears to be an adaptive response to overfeeding. Fat tissues signal the liver to produce inflammatory signals. Thus, compared to normal-weight individuals, obese people have increase circulating levels of pro-inflammatory cells flowing throughout the body.          How can I change my diet to affect inflammation?    Nutrients from different foods can play key role in promoting or reducing inflammation in the body.   Nutrients that promote inflammation include:    Excess calories  Omega 6 fatty acids (animal products, corn, sunflower seeds, etc.)   Sugary foods and refined carbohydrates  Trans fats   Saturated fats- found in fried foods  Alcohol    Nutrients that decrease inflammation include:    Omega 3 (EPA, DHA, fish, etc.)  Ascorbic acid (vitamin C)   Vitamin E  Prebiotics and probiotics   Anti-oxidants                   Foods to Eat Foods to Avoid   Omega-3 fats  - Cold water fish (salmon, sardines, herring and mackerel)  - Fish oil supplement  - Ground flax seeds/oils  - Walnuts  - [place]  seeds    Oils  - Avocado  - [place]  - Canola  - Olive    Antioxidants  - Red, orange, and yellow fruits/veggies  - Citrus fruits, berries and kiwi  - Dark leafy greens    Black and green tea     Prebiotics  - Onions, garlic   - Asparagus  - Bananas and apples    Probiotics  - Fermented foods  - Yogurt, kefir  - Kombucha  - Supplements that are 3rd party tested and have 3 or more strains    Spices  - [place]  - Turmeric  - Oregano  - Cayenne  - Clove  - Nutmeg     Herbs (contact your physician before adding these to your diet)  - [place]  - Orange bark  - Feverfew  Saturated fat foods  - Red meats (limit to 1x/week)    Trans fat foods  - Baked foods  - Fried foods     Omega 6 foods  - Corn  - Cotton seed, grapeseed  - Peanut/ peanut oil   - Safflower oil   - Soy oil  - Sunflower seeds/oil     Simple carbohydrates  White breads/bagels, muffins, etc.        Sample Meal Plan    Breakfast: 1 piece of whole wheat toast, 2 soft boiled eggs and a jalil    Lunch: Salad with baked chicken, white beans, dark leafy greens and a variety of veggies. Top with oil based vinaigrette and berries.    Dinner: 3oz baked salmon, quinoa and sautéed tomatoes and zucchini

## 2025-06-29 ENCOUNTER — HEALTH MAINTENANCE LETTER (OUTPATIENT)
Age: 50
End: 2025-06-29

## 2025-09-04 DIAGNOSIS — E11.9 TYPE 2 DIABETES, HBA1C GOAL < 7% (H): ICD-10-CM

## 2025-09-04 RX ORDER — TIRZEPATIDE 15 MG/.5ML
15 INJECTION, SOLUTION SUBCUTANEOUS WEEKLY
Qty: 6 ML | Refills: 1 | Status: SHIPPED | OUTPATIENT
Start: 2025-09-04

## (undated) DEVICE — GLOVE SURG PI ULTRA TOUCH M SZ 8-1/2 LF

## (undated) DEVICE — MARKER SURG SKIN STRL 77734

## (undated) DEVICE — DRAPE C-ARM 60X42" 1013

## (undated) DEVICE — SUCTION MANIFOLD NEPTUNE 2 SYS 1 PORT 702-025-000

## (undated) DEVICE — CATH IV ANGIO INTRO 14GA X 1 3/4" 381467

## (undated) DEVICE — SUTURE VICRYL+ 1 27IN CT-2 VLT VCP335H

## (undated) DEVICE — TOOL DISSECT MIDAS MR8 14CM MATCH HEAD 3MM MR8-14MH30

## (undated) DEVICE — DRAPE SHEET REV FOLD 3/4 9349

## (undated) DEVICE — CUSTOM PACK LUMBAR FUSION SNE5BLFHEA

## (undated) DEVICE — Device

## (undated) DEVICE — DRSG PRIMAPORE 03 1/8X6" 66000318

## (undated) DEVICE — PACK MINOR SINGLE BASIN SSK3001

## (undated) DEVICE — PREP CHLORAPREP W/ORANGE TINT 10.5ML 930715

## (undated) DEVICE — SOL NACL 0.9% IRRIG 1000ML BOTTLE 2F7124

## (undated) DEVICE — DRAPE SHEET THYROID 77X123 89255

## (undated) DEVICE — ESU ELEC BLADE 2.75" COATED/INSULATED E1455

## (undated) DEVICE — GLOVE BIOGEL PI ORTHOPRO SZ 8.5 47685

## (undated) DEVICE — RX SURGIFLO HEMOSTATIC MATRIX 8ML 2991

## (undated) DEVICE — GOWN IMPERVIOUS BREATHABLE 2XL/XLONG

## (undated) DEVICE — SUTURE VICRYL+ 3-0 18IN PS-2 UND VCP497H

## (undated) DEVICE — ESU PENCIL SMOKE EVAC W/ROCKER SWITCH 0703-047-000

## (undated) DEVICE — PLATE GROUNDING ADULT W/CORD 9165L

## (undated) DEVICE — SUTURE VICRYL+ 2-0 CT-2 27" UND VCP269H

## (undated) DEVICE — GLOVE BIOGEL PI INDICATOR 9.0 LF  41690

## (undated) DEVICE — TAPE ADH POROUS 3IN CURITY STD 7046C

## (undated) DEVICE — PIN DISTRACTION 12MM TI BLUE DP-12-TB

## (undated) DEVICE — GLOVE UNDER INDICATOR PI SZ 8.5 LF 41685

## (undated) DEVICE — SOL WATER IRRIG 1000ML BOTTLE 2F7114

## (undated) DEVICE — DRSG STERI STRIP 1/2X4" R1547

## (undated) DEVICE — SOL ADH LIQUID BENZOIN SWAB 0.6ML C1544

## (undated) RX ORDER — EPHEDRINE SULFATE 50 MG/ML
INJECTION, SOLUTION INTRAMUSCULAR; INTRAVENOUS; SUBCUTANEOUS
Status: DISPENSED
Start: 2023-04-10

## (undated) RX ORDER — FENTANYL CITRATE-0.9 % NACL/PF 10 MCG/ML
PLASTIC BAG, INJECTION (ML) INTRAVENOUS
Status: DISPENSED
Start: 2023-04-10

## (undated) RX ORDER — LIDOCAINE HYDROCHLORIDE 10 MG/ML
INJECTION, SOLUTION EPIDURAL; INFILTRATION; INTRACAUDAL; PERINEURAL
Status: DISPENSED
Start: 2023-04-10

## (undated) RX ORDER — FENTANYL CITRATE 50 UG/ML
INJECTION, SOLUTION INTRAMUSCULAR; INTRAVENOUS
Status: DISPENSED
Start: 2023-04-10

## (undated) RX ORDER — DEXAMETHASONE SODIUM PHOSPHATE 4 MG/ML
INJECTION, SOLUTION INTRA-ARTICULAR; INTRALESIONAL; INTRAMUSCULAR; INTRAVENOUS; SOFT TISSUE
Status: DISPENSED
Start: 2023-04-10

## (undated) RX ORDER — PROPOFOL 10 MG/ML
INJECTION, EMULSION INTRAVENOUS
Status: DISPENSED
Start: 2023-04-10

## (undated) RX ORDER — ONDANSETRON 2 MG/ML
INJECTION INTRAMUSCULAR; INTRAVENOUS
Status: DISPENSED
Start: 2023-04-10